# Patient Record
Sex: FEMALE | Race: WHITE | NOT HISPANIC OR LATINO | Employment: FULL TIME | URBAN - METROPOLITAN AREA
[De-identification: names, ages, dates, MRNs, and addresses within clinical notes are randomized per-mention and may not be internally consistent; named-entity substitution may affect disease eponyms.]

---

## 2017-01-23 RX ORDER — PANTOPRAZOLE SODIUM 40 MG/1
TABLET, DELAYED RELEASE ORAL
Qty: 90 TABLET | Refills: 0 | Status: SHIPPED | OUTPATIENT
Start: 2017-01-23 | End: 2017-04-17 | Stop reason: SDUPTHER

## 2017-02-10 ENCOUNTER — OFFICE VISIT (OUTPATIENT)
Dept: SPORTS MEDICINE | Facility: CLINIC | Age: 32
End: 2017-02-10

## 2017-02-10 VITALS
DIASTOLIC BLOOD PRESSURE: 80 MMHG | TEMPERATURE: 98.2 F | WEIGHT: 232 LBS | SYSTOLIC BLOOD PRESSURE: 104 MMHG | HEIGHT: 68 IN | HEART RATE: 76 BPM | BODY MASS INDEX: 35.16 KG/M2 | OXYGEN SATURATION: 100 %

## 2017-02-10 DIAGNOSIS — M25.461 KNEE EFFUSION, RIGHT: ICD-10-CM

## 2017-02-10 DIAGNOSIS — M25.561 ACUTE PAIN OF RIGHT KNEE: Primary | ICD-10-CM

## 2017-02-10 PROCEDURE — 99214 OFFICE O/P EST MOD 30 MIN: CPT | Performed by: FAMILY MEDICINE

## 2017-02-10 PROCEDURE — 73562 X-RAY EXAM OF KNEE 3: CPT | Performed by: FAMILY MEDICINE

## 2017-02-10 NOTE — PROGRESS NOTES
"Subjective   Sena Min is a 31 y.o. female.   Chief Complaint   Patient presents with   • Knee Pain     Patient complains that she has had knee pain for abaout 3 weeks and its not getting any better its getting worse.        History of Present Illness   1.  Patient is here today with complaint of right knee pain.  Patient states approximate 6 weeks ago while at work she did a deep squat and shortly after that she noted pain in the medial, superior and anterior knee.  She did not have any swelling at the time however she states that it does seem a bit \"puffy\" over the last 3 weeks.  The area of most discomfort is located over the superior lateral subpatellar  , worse with prolonged knee flexion, stairs, squats.  No locking or giving way.  No erythema.  Patient had a tib-fib fracture 16 years ago which required an intramedullary nail in the tibia and a plate over the distal fibula.  Patient had the plate removed last year.      Review of Systems   Constitutional: Negative for fever.   Musculoskeletal:        Per history of present illness   Skin: Negative for wound.   Neurological: Negative for numbness.   All other systems reviewed and are negative.    Visit Vitals   • /80 (BP Location: Left arm, Patient Position: Sitting, Cuff Size: Adult)   • Pulse 76   • Temp 98.2 °F (36.8 °C) (Oral)   • Ht 68\" (172.7 cm)   • Wt 232 lb (105 kg)   • SpO2 100%   • BMI 35.28 kg/m2       Objective   Physical Exam   Constitutional: She is oriented to person, place, and time. She appears well-developed and well-nourished.   HENT:   Head: Normocephalic and atraumatic.   Eyes: Conjunctivae and EOM are normal. Pupils are equal, round, and reactive to light.   Cardiovascular:   No peripheral edema   Pulmonary/Chest: Effort normal.   Musculoskeletal:   Right knee in general appearance, mild effusion, no erythema or heat.  Prior surgical scar over the superior tibia.  Well-healed.  Patient has pain with patella compression " quad contraction.  Patient also has pain over the medial and superior portion of the knee at the end of full flexion.  Negative Lachman, equivocal Reyna.  Patient has no pain with resisted knee extension or with resisted straight leg raise.   Neurological: She is alert and oriented to person, place, and time.   Skin: Skin is warm and dry.   Psychiatric: She has a normal mood and affect. Her behavior is normal.   Vitals reviewed.    Right Knee X-Ray  Indication: Pain    AP, Lateral, and Alabaster views    Findings:  No fracture  No bony lesion  Normal soft tissues  Normal joint spaces, patient does have a patella all to, some mild spurring over the lateral femoral condyle, slight genu valgus attitude. Mild enthesopathy superior patella pole.  Do not see the intramedullary kelly on this exam.    No prior studies were available for comparison.    Assessment/Plan   Sena was seen today for knee pain.    Diagnoses and all orders for this visit:    Acute pain of right knee  -     XR Knee 3+ View With Alabaster Right  -     MRI Knee Right Without Contrast; Future    Knee effusion, right  -     MRI Knee Right Without Contrast; Future      We will need to rule out internal derangement of the knee, will check MRI, patient will follow-up with me 2-3 days after the MRI is done.

## 2017-02-16 ENCOUNTER — HOSPITAL ENCOUNTER (OUTPATIENT)
Dept: MRI IMAGING | Facility: HOSPITAL | Age: 32
Discharge: HOME | End: 2017-02-16
Admitting: FAMILY MEDICINE

## 2017-02-16 DIAGNOSIS — M25.461 KNEE EFFUSION, RIGHT: ICD-10-CM

## 2017-02-16 DIAGNOSIS — M25.561 ACUTE PAIN OF RIGHT KNEE: ICD-10-CM

## 2017-02-16 PROCEDURE — 73721 MRI JNT OF LWR EXTRE W/O DYE: CPT

## 2017-02-20 DIAGNOSIS — S83.282A TEAR OF LATERAL MENISCUS OF LEFT KNEE, CURRENT, UNSPECIFIED TEAR TYPE, INITIAL ENCOUNTER: Primary | ICD-10-CM

## 2017-02-21 ENCOUNTER — TELEPHONE (OUTPATIENT)
Dept: ORTHOPEDIC SURGERY | Facility: CLINIC | Age: 32
End: 2017-02-21

## 2017-02-22 ENCOUNTER — TELEPHONE (OUTPATIENT)
Dept: SPORTS MEDICINE | Facility: CLINIC | Age: 32
End: 2017-02-22

## 2017-02-22 NOTE — TELEPHONE ENCOUNTER
----- Message from Maame Hill sent at 2/22/2017 11:17 AM EST -----      ----- Message -----     From: Kodi Bro MD     Sent: 2/20/2017   8:35 AM       To: Maame Hill    Meniscal tear and some loose bodies, refer to ortho, order has been placed

## 2017-02-24 ENCOUNTER — OFFICE VISIT (OUTPATIENT)
Dept: ORTHOPEDIC SURGERY | Facility: CLINIC | Age: 32
End: 2017-02-24

## 2017-02-24 VITALS — BODY MASS INDEX: 38.65 KG/M2 | WEIGHT: 232 LBS | TEMPERATURE: 99 F | HEIGHT: 65 IN

## 2017-02-24 DIAGNOSIS — S83.271A COMPLEX TEAR OF LATERAL MENISCUS OF RIGHT KNEE AS CURRENT INJURY, INITIAL ENCOUNTER: ICD-10-CM

## 2017-02-24 DIAGNOSIS — M94.261 CHONDROMALACIA, KNEE, RIGHT: Primary | ICD-10-CM

## 2017-02-24 PROCEDURE — 20610 DRAIN/INJ JOINT/BURSA W/O US: CPT | Performed by: ORTHOPAEDIC SURGERY

## 2017-02-24 PROCEDURE — 99244 OFF/OP CNSLTJ NEW/EST MOD 40: CPT | Performed by: ORTHOPAEDIC SURGERY

## 2017-02-24 RX ORDER — BUPIVACAINE HYDROCHLORIDE 5 MG/ML
2 INJECTION, SOLUTION PERINEURAL
Status: COMPLETED | OUTPATIENT
Start: 2017-02-24 | End: 2017-02-24

## 2017-02-24 RX ORDER — ACETAMINOPHEN 325 MG/1
650 TABLET ORAL AS NEEDED
COMMUNITY
End: 2017-05-04

## 2017-02-24 RX ORDER — IBUPROFEN 200 MG
200 TABLET ORAL AS NEEDED
COMMUNITY
End: 2017-02-24

## 2017-02-24 RX ORDER — MELOXICAM 15 MG/1
TABLET ORAL
Qty: 30 TABLET | Refills: 1 | Status: SHIPPED | OUTPATIENT
Start: 2017-02-24 | End: 2017-05-04

## 2017-02-24 RX ORDER — LIDOCAINE HYDROCHLORIDE 10 MG/ML
2 INJECTION, SOLUTION INFILTRATION; PERINEURAL
Status: COMPLETED | OUTPATIENT
Start: 2017-02-24 | End: 2017-02-24

## 2017-02-24 RX ORDER — METHYLPREDNISOLONE ACETATE 80 MG/ML
80 INJECTION, SUSPENSION INTRA-ARTICULAR; INTRALESIONAL; INTRAMUSCULAR; SOFT TISSUE
Status: COMPLETED | OUTPATIENT
Start: 2017-02-24 | End: 2017-02-24

## 2017-02-24 RX ADMIN — BUPIVACAINE HYDROCHLORIDE 2 ML: 5 INJECTION, SOLUTION PERINEURAL at 14:00

## 2017-02-24 RX ADMIN — METHYLPREDNISOLONE ACETATE 80 MG: 80 INJECTION, SUSPENSION INTRA-ARTICULAR; INTRALESIONAL; INTRAMUSCULAR; SOFT TISSUE at 14:00

## 2017-02-24 RX ADMIN — LIDOCAINE HYDROCHLORIDE 2 ML: 10 INJECTION, SOLUTION INFILTRATION; PERINEURAL at 14:00

## 2017-02-24 NOTE — PROGRESS NOTES
New Patient Complaint      Patient: Sena Min  YOB: 1985 31 y.o. female  Medical Record Number: [id number]    Chief Complaints: Knee hurts    History of Present Illness:  6-8 week history of moderate constant aching pain over the anterior and somewhat lateral aspect of the right knee with associated swelling really hasn't had any franky locking or catching.  No inciting injury but had a tibia fracture when she was 15 with medullary fixation nail has been removed. It's worse with squatting and kneeling and stairs.  It's been improved some intermittent use of ibuprofen and rest.  She is seen today at the request of Dr. Bro whose requested my opinion regarding etiology and treatment of this condition.        HPI    Allergies: No Known Allergies    Medications:   Current Outpatient Prescriptions on File Prior to Visit   Medication Sig   • guaifenesin-codeine (CHERATUSSIN AC) 100-10 MG/5ML liquid 10 ml po Q4H prn cough   • pantoprazole (PROTONIX) 40 MG EC tablet TAKE 1 TABLET BY MOUTH DAILY   • benzonatate (TESSALON) 200 MG capsule One PO Q8H for cough     No current facility-administered medications on file prior to visit.        Past Medical History   Diagnosis Date   • Anxiety    • Depression    • Factor 5 Leiden mutation, heterozygous    • GERD (gastroesophageal reflux disease)      Past Surgical History   Procedure Laterality Date   • Leg surgery  01/2016     plate removed   • Rosine tooth extraction  2009     Social History     Occupational History   • Not on file.     Social History Main Topics   • Smoking status: Never Smoker   • Smokeless tobacco: Not on file   • Alcohol use No   • Drug use: Defer   • Sexual activity: Defer      Social History     Social History Narrative     Family History   Problem Relation Age of Onset   • Breast cancer Mother    • Hypertension Mother    • Hypertension Father    • Factor V Leiden deficiency Father    • Factor V Leiden deficiency Sister    •  "Factor V Leiden deficiency Paternal Aunt    • Breast cancer Maternal Grandmother    • Diabetes Maternal Grandfather    • Alzheimer's disease Paternal Grandmother        Review of Systems: 14 point review of systems performed, positive pertinent findings identified in HPI. All remaining systems negative except cough     Review of Systems      Physical Exam:   Vitals:    02/24/17 1314   Temp: 99 °F (37.2 °C)   Weight: 232 lb (105 kg)   Height: 65\" (165.1 cm)     Physical Exam   Constitutional: pleasant, well developed   Eyes: sclera non icteric  Hearing : adequate for exam  Cardiovascular: palpable pulses in foot, calf/ thigh NT without sign of DVT  Respiratoy: breathing unlabored   Neurological: grossly sensate to LT throughout LE  Psychiatric: oriented with normal mood and affect.   Lymphatic: No palpable popliteal lymphadenopathy  Skin: intact throughout leg/foot  Musculoskeletal: Nonantalgic gait.  Well-healed incision anterior right knee 5 out of 5 extension strength.  0-115° range of motion.  Mild discomfort with crepitus on patellofemoral compression but no apprehension.  Mild discomfort over the lateral joint line but no exacerbation today with Reyna's.  Stable ligamentous exam  Physical Exam  Ortho Exam    Radiology: MRI films and report of the right knee dated 2/17/17 were reviewed and shows chronic chondromalacia lateral patellar facet was some intra-articular chondral bodies along the posterior lateral edge the medial compartment and appear meniscal cyst adjacent to a complex anterior lateral meniscal horn tear.    Assessment/Plan: Right knee chondromalacia patella with lateral anterior horn meniscal tear with meniscal cyst and intra-articular chondral bodies.  We discussed operative and nonoperative treatment options she's quite busy at work and other activities we'll like to avoid surgical treatment at this time.  We discussed other treatment options will have her start some physical therapy at " [facility] and begin meloxicam 15 mg one by mouth daily with GI precautions.  She'll stop her ibuprofen.  Also right knee was injected with steroid.  She will avoid exacerbating activities and I'll see her back in 1 month if she is unimproved we'll proceed with arthroscopy.  Standing x-rays of her right knee at follow-up    Large Joint Arthrocentesis  Date/Time: 2/24/2017 2:00 PM  Consent given by: patient  Site marked: site marked  Timeout: Immediately prior to procedure a time out was called to verify the correct patient, procedure, equipment, support staff and site/side marked as required   Supporting Documentation  Indications: pain   Procedure Details  Location: knee - R knee  Needle size: 22 G  Approach: anteromedial  Medications administered: 2 mL lidocaine 1 %; 80 mg methylPREDNISolone acetate 80 MG/ML; 2 mL bupivacaine  Patient tolerance: patient tolerated the procedure well with no immediate complications

## 2017-02-27 ENCOUNTER — TELEPHONE (OUTPATIENT)
Dept: ORTHOPEDIC SURGERY | Facility: CLINIC | Age: 32
End: 2017-02-27

## 2017-02-27 NOTE — TELEPHONE ENCOUNTER
"Return patient's phone call she said her knee injected about 3 days ago had quite a bit of pain over the weekend which has now thankfully improved but her knee really doesn't feel any better.\":  Head and doing the surgery\" I told her I wanted to see how the injection did for her first and it could take a week to 10 days to really can't can.  She was in agreement with that and is going to start some physical therapy towards end of this week but is going to call me next week and see how she is doing in the interim she is going to pay attention to have much mechanical symptoms if any are relieved.  Any further problems or questions she will call.  "

## 2017-02-27 NOTE — TELEPHONE ENCOUNTER
Second my chart message:      Update about pain after cortisone injection: the pain subsided about 28 hours after the injection. I am now only taking the Meloxicam and Tylenol.              Encounter Messages

## 2017-02-27 NOTE — TELEPHONE ENCOUNTER
----- Message from Sena Min sent at 2/25/2017  4:31 AM EST -----  Regarding: Visit Follow-Up Question    My chart message:    I need to report a worsening of pain after the cortisone injection. Within 1 hour I had a little bit of swelling and tightness in my knee. At 4 hours, the pain was bad enough I went ahead and took a dose of codeine. About an hour later, I was in the worst pain of my life extending from my knee to mid back on the right side. After waiting for the codeine to wear off (4 hours later) I took some hydrocodone I had been prescribed after a procedure last year but never needed. This relieved the pain for 5 hours, but also makes me sleepy. The new swelling has subsided, and no other reaction occurred. After some online research, I found these pain flares last 1-3 days.  If it last longer or a new symptom appears, I will be calling.  In the future, I do not wish to receive these injections.

## 2017-03-01 ENCOUNTER — TELEPHONE (OUTPATIENT)
Dept: ORTHOPEDIC SURGERY | Facility: CLINIC | Age: 32
End: 2017-03-01

## 2017-03-02 ENCOUNTER — TELEPHONE (OUTPATIENT)
Dept: ORTHOPEDIC SURGERY | Facility: CLINIC | Age: 32
End: 2017-03-02

## 2017-03-02 NOTE — TELEPHONE ENCOUNTER
: Spoke with patient after receiving email from her that her knee was feeling much better after the injection pain had worn off that she was not having any mechanical symptoms to some dull achy pain in the knee with swelling similar to what she had prior to the injection.  She felt like the work restrictions that I had placed on her note were too limiting and after long careful discussion with her she felt like she could do a 20 pound weightlifting restriction without crutches but no squatting or kneeling or use a wheelbarrow.  We'll place a new note in her chart to that effect.  If anything worsens she will back off and let me know otherwise will follow-up as scheduled

## 2017-03-03 ENCOUNTER — TREATMENT (OUTPATIENT)
Dept: PHYSICAL THERAPY | Facility: CLINIC | Age: 32
End: 2017-03-03

## 2017-03-03 DIAGNOSIS — S83.271D: ICD-10-CM

## 2017-03-03 DIAGNOSIS — M94.261 CHONDROMALACIA OF KNEE, RIGHT: Primary | ICD-10-CM

## 2017-03-03 PROCEDURE — G0283 ELEC STIM OTHER THAN WOUND: HCPCS | Performed by: PHYSICAL THERAPIST

## 2017-03-03 PROCEDURE — 97161 PT EVAL LOW COMPLEX 20 MIN: CPT | Performed by: PHYSICAL THERAPIST

## 2017-03-03 PROCEDURE — 97110 THERAPEUTIC EXERCISES: CPT | Performed by: PHYSICAL THERAPIST

## 2017-03-03 NOTE — PATIENT INSTRUCTIONS
Access Code: [email or web address]   URL: [email or web address]   Date: 03/03/2017   Prepared by: Iveth Gutierrez     Exercises   Supine Active Straight Leg Raise - 10 reps - 2 sets - 1x daily   Clamshell - 10 reps - 2 sets - 1x daily   Sidelying Hip Abduction - 10 reps - 2 sets - 1x daily   Prone Hip Extension - One Pillow - 10 reps - 2 sets - 1x daily   Straight Leg Raise with External Rotation - 10 reps - 2 sets - 1x daily

## 2017-03-03 NOTE — PROGRESS NOTES
Physical Therapy Initial Evaluation and Plan of Care    Patient: Sena Min   : 1985  Diagnosis/ICD-10 Code:  Chondromalacia of knee, right [M94.261]  Referring practitioner: Ad Solorzano*  Date of Initial Visit: 3/3/2017  Today's Date: 3/3/2017    Subjective Evaluation    History of Present Illness  Onset date: About two months ago.  Mechanism of injury: Pt reports onset of knee swelling two months ago after performing a deep squat. No pain noted at that time, primary c/o that sent pt to MD was swelling.  She states MD noted arthritis behind the knee cap, MRI showed chondromalacia and lateral meniscus tear. MD did a cortisone injection in office.    Quality of life: good    Pain  Current pain ratin  At best pain ratin  At worst pain ratin (5 hours after cortisone injection)  Location: Right knee  Quality: dull ache and sharp  Relieving factors: ice  Exacerbated by: swelling is constant, not affected by activity.  Progression: no change    Social Support  Lives in: multiple-level home  Lives with: parents    Diagnostic Tests  MRI studies: abnormal (see report in chart)    Treatments  Previous treatment: injection treatment  Patient Goals  Patient goals for therapy: decreased edema and return to work (avoid surgery, learn good body mechanics for lifting)             Objective     Palpation     Right   No palpable tenderness to the distal biceps femoris, distal semimembranosus, distal semitendinosus, rectus femoris, vastus lateralis and vastus medialis.     Tenderness     Right Knee   No tenderness in the inferior patella, lateral joint line, lateral patella, medial joint line, medial patella, patellar tendon and quadriceps tendon.     Neurological Testing   Sensation     Knee   Left Knee   Intact: light touch    Right Knee   Intact: light touch     Active Range of Motion   Left Knee   Flexion: 122 degrees   Extension: 3 (hyperextension) degrees     Right Knee   Flexion: 121  degrees   Extension: 4 (hyperextension) degrees     Patellar Static Positioning   Left Knee: lateral tilt and halegih  Right Knee: lateral tilt and haleigh    Strength/Myotome Testing     Left Hip   Planes of Motion   Flexion: 4-  Extension: 4-  Abduction: 4-  External rotation: 4-  Internal rotation: 4-    Right Hip   Planes of Motion   Flexion: 4-  Extension: 4-  Abduction: 4-  External rotation: 4-  Internal rotation: 4-    Left Knee   Flexion: 5  Extension: 5    Right Knee   Flexion: 4-  Extension: 4-    Tests     Right Knee   Positive Apley's compression, lateral Reyna and patella-femoral grind.   Negative anterior drawer, anterior Lachman, Apley's distraction, bounce home, medial Reyna, patellar apprehension, posterior Lachman, valgus stress test at 0 degrees, valgus stress test at 30 degrees, varus stress test at 0 degrees and varus stress test at 30 degrees.     Swelling     Left Knee Girth Measurement (cm)   Joint line: 44 cm  10 cm below joint line: 44 cm    Right Knee Girth Measurement (cm)   Joint line: 44 cm  10 cm below joint line: 45 cm    Functional Assessment   Squat   Left valgus, left tibial anterior translation beyond toes, right valgus and right tibial anterior translation beyond toes.     Single Leg Squat   Left Leg  Valgus and anterior tibial translation beyond toes.     Right Leg  Pain, valgus and tibial anterior translation beyond toes.     General Comments     Knee Comments  IPP L 40 cm R 41 cm         Assessment & Plan     Assessment  Impairments: abnormal gait, abnormal or restricted ROM, impaired physical strength, lacks appropriate home exercise program and pain with function  Assessment details: Pt will benefit from skilled PT services in order to address listed impairments and increase tolerance to normal daily activities including ADL's, work and recreational activities.    Prognosis: good  Prognosis details: Short Term Goals: 4-6 weeks. Patient will:  1. Be independent with initial  HEP  2. Be instructed in posture and body mechanics  3. Report pain of </= 2/10 with all daily activities    Long Term Goals: 6-12 weeks. Patient will:  1. Demonstrate improved Bilateral lower extremity MMT of >/= 4+/5  2. Demonstrate symmetrical knee girth measurements.  3. Demonstrate adequate control of dynamic genu valgus with squat and single leg squat to allow lifting/squatting without increased pain.  4. Report pain of </= 0/10 with all daily activities.  5. Perceived disability </= 10% as measured by LEFS         Plan  Therapy options: will be seen for skilled physical therapy services  Planned modality interventions: cryotherapy, thermotherapy (hydrocollator packs), ultrasound and electrical stimulation/Russian stimulation  Planned therapy interventions: manual therapy, neuromuscular re-education, soft tissue mobilization, strengthening, stretching, joint mobilization, home exercise program, gait training, functional ROM exercises, flexibility, body mechanics training, balance/weight-bearing training and abdominal trunk stabilization  Frequency: 2x week  Duration in weeks: 12  Treatment plan discussed with: patient        Manual Therapy:    0     mins  44366;  Therapeutic Exercise:    15     mins  57065;     Neuromuscular Ramana:    0    mins  64117;    Therapeutic Activity:     0     mins  11302;     Gait Trainin     mins  01115;     Ultrasound:     0     mins  90461;    Electrical Stimulation:    15     mins  83726 ( );    Timed Treatment:   15   mins   Total Treatment:     60   mins    PT SIGNATURE: Iveth Gutierrez PT, DPT          Physical Therapist                               [place] License #[id number]    DATE TREATMENT INITIATED: 3/3/2017    Initial Certification Certification Period: 2017  I certify that the therapy services are furnished while this patient is under my care.  The services outlined above are required by this patient, and will be reviewed every 90 days.     PHYSICIAN: Ad  Wei Solorzano MD      DATE:     Please sign and return via fax to [phone].. Thank you, [facility].

## 2017-03-08 ENCOUNTER — TREATMENT (OUTPATIENT)
Dept: PHYSICAL THERAPY | Facility: CLINIC | Age: 32
End: 2017-03-08

## 2017-03-08 DIAGNOSIS — S83.271D: ICD-10-CM

## 2017-03-08 DIAGNOSIS — M94.261 CHONDROMALACIA OF KNEE, RIGHT: Primary | ICD-10-CM

## 2017-03-08 PROCEDURE — 97110 THERAPEUTIC EXERCISES: CPT | Performed by: PHYSICAL THERAPIST

## 2017-03-08 PROCEDURE — G0283 ELEC STIM OTHER THAN WOUND: HCPCS | Performed by: PHYSICAL THERAPIST

## 2017-03-08 NOTE — PROGRESS NOTES
" Physical Therapy Daily Progress Note    Subjective     Sena Min reports: adherence with HEP, knee is \"feeling better\"      Objective   See Exercise, Manual, and Modality Logs for complete treatment.       Assessment/Plan  Tolerated new exercises well without pain.  Requires cueing for squat form.  Progress per Plan of Care           Manual Therapy:    0     mins  53839;  Therapeutic Exercise:    40     mins  68780;     Neuromuscular Ramana:    0    mins  48487;    Therapeutic Activity:     0     mins  24029;     Gait Trainin     mins  73712;     Ultrasound:     0     mins  13486;    Electrical Stimulation:    15     mins  05027 ( );    Timed Treatment:   40   mins   Total Treatment:     55   mins     Iveth Gutierrez PT, DPT  Physical Therapist  [place] License #[id number]          "

## 2017-03-10 ENCOUNTER — TREATMENT (OUTPATIENT)
Dept: PHYSICAL THERAPY | Facility: CLINIC | Age: 32
End: 2017-03-10

## 2017-03-10 DIAGNOSIS — M94.261 CHONDROMALACIA OF KNEE, RIGHT: Primary | ICD-10-CM

## 2017-03-10 DIAGNOSIS — S83.271D: ICD-10-CM

## 2017-03-10 PROCEDURE — G0283 ELEC STIM OTHER THAN WOUND: HCPCS | Performed by: PHYSICAL THERAPIST

## 2017-03-10 PROCEDURE — 97110 THERAPEUTIC EXERCISES: CPT | Performed by: PHYSICAL THERAPIST

## 2017-03-10 NOTE — PROGRESS NOTES
Physical Therapy Daily Progress Note    Subjective     Sena Min reports: some increased swelling after last session, also did some yard work and both knees were sore.      Objective   See Exercise, Manual, and Modality Logs for complete treatment.       Assessment/Plan  Tolerated well without increased pain.  Progress strengthening /stabilization /functional activity           Manual Therapy:    0     mins  44290;  Therapeutic Exercise:    30     mins  55116;     Neuromuscular Ramana:    0    mins  17357;    Therapeutic Activity:     0     mins  29825;     Gait Trainin     mins  49190;     Ultrasound:     0     mins  32799;    Electrical Stimulation:    15     mins  62849 ( );    Timed Treatment:   30   mins   Total Treatment:     45   mins    Iveth Gutierrez PT, DPT  Physical Therapist  [place] License #[id number]

## 2017-03-14 ENCOUNTER — TREATMENT (OUTPATIENT)
Dept: PHYSICAL THERAPY | Facility: CLINIC | Age: 32
End: 2017-03-14

## 2017-03-14 DIAGNOSIS — M94.261 CHONDROMALACIA OF KNEE, RIGHT: Primary | ICD-10-CM

## 2017-03-14 DIAGNOSIS — S83.271D: ICD-10-CM

## 2017-03-14 PROCEDURE — G0283 ELEC STIM OTHER THAN WOUND: HCPCS | Performed by: PHYSICAL THERAPIST

## 2017-03-14 PROCEDURE — 97110 THERAPEUTIC EXERCISES: CPT | Performed by: PHYSICAL THERAPIST

## 2017-03-14 NOTE — PROGRESS NOTES
Physical Therapy Daily Progress Note    Subjective     Sena Min reports: knee is getting better overall      Objective   See Exercise, Manual, and Modality Logs for complete treatment.       Assessment/Plan  Lateral hip strength improving.  Will benefit from continued PT to increase strength and improve body mechanics with lifting.  Progress per Plan of Care           Manual Therapy:    0     mins  01825;  Therapeutic Exercise:    35     mins  88874;     Neuromuscular Ramana:    0    mins  79372;    Therapeutic Activity:     0     mins  98960;     Gait Trainin     mins  87077;     Ultrasound:     0     mins  37300;    Electrical Stimulation:    15     mins  21522 ( );    Timed Treatment:   35   mins   Total Treatment:     50   mins    Iveth Gutierrez PT, DPT  Physical Therapist  [place] License #[id number]

## 2017-03-16 ENCOUNTER — TREATMENT (OUTPATIENT)
Dept: PHYSICAL THERAPY | Facility: CLINIC | Age: 32
End: 2017-03-16

## 2017-03-16 DIAGNOSIS — M94.261 CHONDROMALACIA OF KNEE, RIGHT: Primary | ICD-10-CM

## 2017-03-16 DIAGNOSIS — S83.271D: ICD-10-CM

## 2017-03-16 PROCEDURE — 97110 THERAPEUTIC EXERCISES: CPT | Performed by: PHYSICAL THERAPIST

## 2017-03-16 PROCEDURE — G0283 ELEC STIM OTHER THAN WOUND: HCPCS | Performed by: PHYSICAL THERAPIST

## 2017-03-16 NOTE — PROGRESS NOTES
" Physical Therapy Daily Progress Note    Subjective     Sena Min reports: knee is feeling \"pretty good\"      Objective   See Exercise, Manual, and Modality Logs for complete treatment.       Assessment/Plan  Tolerates new exercises well without pain.  Improved lateral hip control, still unable to perform full squat without pain, only partial squat.  Progress per Plan of Care           Manual Therapy:    0     mins  84075;  Therapeutic Exercise:    40     mins  64798;     Neuromuscular Ramana:    0    mins  89755;    Therapeutic Activity:     0     mins  63839;     Gait Trainin     mins  22463;     Ultrasound:     0     mins  75448;    Electrical Stimulation:    15     mins  15200 ( );    Timed Treatment:   40   mins   Total Treatment:     55   mins    Iveth Gutierrez PT, DPT  Physical Therapist  [place] License #[id number]                    "

## 2017-03-21 ENCOUNTER — TREATMENT (OUTPATIENT)
Dept: PHYSICAL THERAPY | Facility: CLINIC | Age: 32
End: 2017-03-21

## 2017-03-21 DIAGNOSIS — S83.271D: ICD-10-CM

## 2017-03-21 DIAGNOSIS — M94.261 CHONDROMALACIA OF KNEE, RIGHT: Primary | ICD-10-CM

## 2017-03-21 PROCEDURE — 97110 THERAPEUTIC EXERCISES: CPT | Performed by: PHYSICAL THERAPIST

## 2017-03-21 PROCEDURE — G0283 ELEC STIM OTHER THAN WOUND: HCPCS | Performed by: PHYSICAL THERAPIST

## 2017-03-23 ENCOUNTER — TREATMENT (OUTPATIENT)
Dept: PHYSICAL THERAPY | Facility: CLINIC | Age: 32
End: 2017-03-23

## 2017-03-23 DIAGNOSIS — M94.261 CHONDROMALACIA OF KNEE, RIGHT: Primary | ICD-10-CM

## 2017-03-23 DIAGNOSIS — S83.271D: ICD-10-CM

## 2017-03-23 PROCEDURE — 97110 THERAPEUTIC EXERCISES: CPT | Performed by: PHYSICAL THERAPIST

## 2017-03-23 PROCEDURE — G0283 ELEC STIM OTHER THAN WOUND: HCPCS | Performed by: PHYSICAL THERAPIST

## 2017-03-23 NOTE — PROGRESS NOTES
" Physical Therapy Daily Progress Note    Subjective     Sena Min reports: knee is feeling \"really good\"      Objective   See Exercise, Manual, and Modality Logs for complete treatment.       Assessment/Plan  Girth measurements are now symmetrical.  Did well with addition of ankle weight to mat exercises.  Recommend continuing PT to further progress strength and stability at the knee.  Progress per Plan of Care           Manual Therapy:    0     mins  10629;  Therapeutic Exercise:    40     mins  13152;     Neuromuscular Ramana:    0    mins  15629;    Therapeutic Activity:     0     mins  53477;     Gait Trainin     mins  41106;     Ultrasound:     0     mins  24813;    Electrical Stimulation:    15     mins  59131 ( );    Timed Treatment:   40   mins   Total Treatment:     55   mins    Iveth Gutierrez PT, DPT  Physical Therapist  [place] License #[id number]                    "

## 2017-03-24 ENCOUNTER — OFFICE VISIT (OUTPATIENT)
Dept: ORTHOPEDIC SURGERY | Facility: CLINIC | Age: 32
End: 2017-03-24

## 2017-03-24 VITALS — HEIGHT: 66 IN | TEMPERATURE: 98.4 F | BODY MASS INDEX: 36.96 KG/M2 | WEIGHT: 230 LBS

## 2017-03-24 DIAGNOSIS — M94.261 CHONDROMALACIA, KNEE, RIGHT: Primary | ICD-10-CM

## 2017-03-24 DIAGNOSIS — S83.271A COMPLEX TEAR OF LATERAL MENISCUS OF RIGHT KNEE AS CURRENT INJURY, INITIAL ENCOUNTER: ICD-10-CM

## 2017-03-24 PROBLEM — M94.269 CHONDROMALACIA, KNEE: Status: ACTIVE | Noted: 2017-03-24

## 2017-03-24 PROCEDURE — 73562 X-RAY EXAM OF KNEE 3: CPT | Performed by: ORTHOPAEDIC SURGERY

## 2017-03-24 PROCEDURE — 99213 OFFICE O/P EST LOW 20 MIN: CPT | Performed by: ORTHOPAEDIC SURGERY

## 2017-03-27 NOTE — PROGRESS NOTES
"Knee Exam      Patient: Sena Min    YOB: 1985 31 y.o. female    Chief Complaints: knee feels better    History of Present Illness: Patient was seen on 2/24/17 with a 6-8 week history of moderate constant aching pain over the anterior lateral aspect of the right knee with some associated swelling but no franky mechanical symptoms.  His history of intramedullary fixation of the tibia which has been removed.  She had steroid injection done at her last visit has also been on meloxicam and been doing physical therapy with significant improvement in her right knee pain currently says is \"doing great\".  Not having any pain swelling or mechanical symptoms.  HPI    ROS: No knee pain  Past Medical History:   Diagnosis Date   • Anxiety    • Depression    • Factor 5 Leiden mutation, heterozygous    • GERD (gastroesophageal reflux disease)        Physical Exam:   Vitals:    03/24/17 1322   Temp: 98.4 °F (36.9 °C)   Weight: 230 lb (104 kg)   Height: 66\" (167.6 cm)     Well developed with normal mood.  Nonantalgic gait.  Examination the right knee shows no warmth erythema or swelling.  0-115° range of motion some mild crepitus with patellofemoral compression but no significant discomfort.  No pain today over the lateral joint line nor any pain with Reyna's.      Radiology: 3 views of the right knee were ordered today to evaluate alignment reviewed and there are no prior x-rays available for comparison.  Overall joint space appears to be relatively well maintained with some mild gingival change of the patellofemoral joint with some lateral tilt.      Assessment/Plan: Right knee Chondral malacia patella with lateral anterior horn meniscal tear with loose bodies along the posterior edge of the medial compartment.  Discussed treatment options with her today and as she is not having any symptoms I would not recommend any surgical treatment and she is in agreement with that.  She will continue with " meloxicam and check with her PCP regarding ongoing dosing and monitoring of that.  She's not taking any pain medications in over 3 weeks now.  She will continue with physical therapy concentrating on VMO strengthening and progress to home exercises.  She may return to work without restrictions if she has any return of pain swelling or mechanical symptoms she'll let me know otherwise I'll see her back as needed

## 2017-03-28 ENCOUNTER — TREATMENT (OUTPATIENT)
Dept: PHYSICAL THERAPY | Facility: CLINIC | Age: 32
End: 2017-03-28

## 2017-03-28 PROCEDURE — 97110 THERAPEUTIC EXERCISES: CPT | Performed by: PHYSICAL THERAPIST

## 2017-03-28 PROCEDURE — G0283 ELEC STIM OTHER THAN WOUND: HCPCS | Performed by: PHYSICAL THERAPIST

## 2017-03-28 NOTE — PROGRESS NOTES
Physical Therapy Daily Progress Note    Subjective     Sena Min reports: she saw her MD, who is pleased with progress, is to follow up PRN with him, continue PT with transition to HEP.  She returned to full duty at work yesterday.      Objective   See Exercise, Manual, and Modality Logs for complete treatment.       Assessment/Plan  Tolerates exercises well.  Will benefit from 1-2 more weeks of PT to further progress stabilization exercises and establish long term HEP.  Progress per Plan of Care           Manual Therapy:    0     mins  34276;  Therapeutic Exercise:    40     mins  91178;     Neuromuscular Ramana:    0    mins  52209;    Therapeutic Activity:     0     mins  17972;     Gait Trainin     mins  72739;     Ultrasound:     0     mins  60881;    Electrical Stimulation:    15     mins  34544 ( );    Timed Treatment:   40   mins   Total Treatment:     55   mins    Iveth Gutierrez PT, DPT  Physical Therapist  [place] License #[id number]

## 2017-03-30 ENCOUNTER — TREATMENT (OUTPATIENT)
Dept: PHYSICAL THERAPY | Facility: CLINIC | Age: 32
End: 2017-03-30

## 2017-03-30 DIAGNOSIS — M94.261 CHONDROMALACIA OF KNEE, RIGHT: Primary | ICD-10-CM

## 2017-03-30 DIAGNOSIS — S83.271D: ICD-10-CM

## 2017-03-30 PROCEDURE — G0283 ELEC STIM OTHER THAN WOUND: HCPCS | Performed by: PHYSICAL THERAPIST

## 2017-03-30 PROCEDURE — 97110 THERAPEUTIC EXERCISES: CPT | Performed by: PHYSICAL THERAPIST

## 2017-03-30 NOTE — PROGRESS NOTES
Physical Therapy Daily Progress Note    Subjective     Sena Min reports: knee feels good.  Is working into doing more lifting at work.      Objective   See Exercise, Manual, and Modality Logs for complete treatment.       Assessment/Plan  Tolerated exercises well without pain. Good lateral hip control with standing exercises.  Requires min cueing for form.  Progress per Plan of Care           Manual Therapy:    0     mins  71293;  Therapeutic Exercise:    45     mins  50028;     Neuromuscular Ramana:    0    mins  77077;    Therapeutic Activity:     0     mins  93128;     Gait Trainin     mins  44395;     Ultrasound:     0     mins  06097;    Electrical Stimulation:    15     mins  07814 ( );    Timed Treatment:   45   mins   Total Treatment:     60   mins    Iveth Gutierrez PT, DPT  Physical Therapist  [place] License #[id number]

## 2017-04-04 ENCOUNTER — TREATMENT (OUTPATIENT)
Dept: PHYSICAL THERAPY | Facility: CLINIC | Age: 32
End: 2017-04-04

## 2017-04-04 DIAGNOSIS — M94.261 CHONDROMALACIA OF KNEE, RIGHT: Primary | ICD-10-CM

## 2017-04-04 DIAGNOSIS — S83.271D: ICD-10-CM

## 2017-04-04 PROCEDURE — G0283 ELEC STIM OTHER THAN WOUND: HCPCS | Performed by: PHYSICAL THERAPIST

## 2017-04-04 PROCEDURE — 97110 THERAPEUTIC EXERCISES: CPT | Performed by: PHYSICAL THERAPIST

## 2017-04-04 NOTE — PROGRESS NOTES
Re-Assessment / Re-Certification    Patient: Sena Min   : 1985  Diagnosis/ICD-10 Code:  Chondromalacia of knee, right [M94.261]  Referring practitioner: Ad Solorzano*  Date of Initial Visit: 2017  Today's Date: 2017  Patient seen for 10 sessions      Subjective:   Sena Min reports: minimal pain, swelling seems to have resolved.  Feels like she is stronger.  Subjective Questionnaire: LEFS: 71/80 (11% perceived disability) (improved from 64/80 at initial evaluation)  Clinical Progress: improved  Home Program Compliance: Yes  Treatment has included: therapeutic exercise, neuromuscular re-education, electrical stimulation and cryotherapy    Subjective Evaluation    Pain  Current pain ratin  At best pain ratin  At worst pain ratin         Objective     Strength/Myotome Testing     Left Hip   Planes of Motion   Flexion: 4+  Extension: 4  Abduction: 4+    Right Hip   Planes of Motion   Flexion: 4+  Extension: 4  Abduction: 4+    Swelling     Right Knee Girth Measurement (cm)   10 cm below joint line: 44 cm    Functional Assessment   Squat No left valgus, no left tibial anterior translation beyond toes, no right valgus and no right tibial anterior translation beyond toes.     Single Leg Squat   Left Leg  Valgus and anterior tibial translation beyond toes.     Right Leg  Valgus and tibial anterior translation beyond toes.     General Comments     Knee Comments  IPP right = 40 cm     Assessment/Plan  Progress toward previous goals: Partially Met    Short Term Goals: 4-6 weeks. Patient will:  1. Be independent with initial HEP (MET)  2. Be instructed in posture and body mechanics (MET)  3. Report pain of </= 2/10 with all daily activities (MET)    Long Term Goals: 6-12 weeks. Patient will:  1. Demonstrate improved Bilateral lower extremity MMT of >/= 4+/5 (PARTIALLY MET)  2. Demonstrate symmetrical knee girth measurements. (MET)  3. Demonstrate adequate control  of dynamic genu valgus with squat and single leg squat to allow lifting/squatting without increased pain. (PARTIALLY MET)  4. Report pain of </= 0/10 with all daily activities. (NOT MET)  5. Perceived disability </= 10% as measured by LEFS (NOT MET)      Recommendations: Continue with recommendations 1-2 more weeks, then D/C to HEP  Timeframe: 1 month  Prognosis to achieve goals: good    PT Signature: Iveth Gutierrez PT, DPT                         Physical Therapist                         [place] License #[id number]    Manual Therapy:    0     mins  52771;  Therapeutic Exercise:    45     mins  37439;     Neuromuscular Ramana:    0    mins  30515;    Therapeutic Activity:     0     mins  85230;     Gait Trainin     mins  68201;     Ultrasound:     0     mins  62203;    Electrical Stimulation:    15     mins  92156 ( );    Timed Treatment:   45   mins   Total Treatment:     60   mins

## 2017-04-11 ENCOUNTER — TREATMENT (OUTPATIENT)
Dept: PHYSICAL THERAPY | Facility: CLINIC | Age: 32
End: 2017-04-11

## 2017-04-11 DIAGNOSIS — S83.271D: ICD-10-CM

## 2017-04-11 DIAGNOSIS — M94.261 CHONDROMALACIA OF KNEE, RIGHT: Primary | ICD-10-CM

## 2017-04-11 PROCEDURE — 97110 THERAPEUTIC EXERCISES: CPT | Performed by: PHYSICAL THERAPIST

## 2017-04-11 NOTE — PROGRESS NOTES
Physical Therapy Daily Progress Note    Subjective     Sena Min reports: no pain with work activities.      Objective   See Exercise, Manual, and Modality Logs for complete treatment.       Assessment/Plan  D/C'd ESTIM since pt is having no pain or edema.  One more visit next week to establish long term HEP, then D/C.   Progress per Plan of Care           Manual Therapy:    0     mins  82629;  Therapeutic Exercise:    45     mins  36998;     Neuromuscular Ramana:    0    mins  57912;    Therapeutic Activity:     0     mins  38143;     Gait Trainin     mins  66249;     Ultrasound:     0     mins  16954;    Electrical Stimulation:    0     mins  56975 ( );    Timed Treatment:   45   mins   Total Treatment:     45   mins    Iveth Gutierrez PT, DPT  Physical Therapist  [place] License #[id number]

## 2017-04-17 ENCOUNTER — TREATMENT (OUTPATIENT)
Dept: PHYSICAL THERAPY | Facility: CLINIC | Age: 32
End: 2017-04-17

## 2017-04-17 DIAGNOSIS — M94.261 CHONDROMALACIA OF KNEE, RIGHT: Primary | ICD-10-CM

## 2017-04-17 DIAGNOSIS — S83.271D: ICD-10-CM

## 2017-04-17 PROCEDURE — 97110 THERAPEUTIC EXERCISES: CPT | Performed by: PHYSICAL THERAPIST

## 2017-04-17 RX ORDER — PANTOPRAZOLE SODIUM 40 MG/1
TABLET, DELAYED RELEASE ORAL
Qty: 90 TABLET | Refills: 0 | Status: SHIPPED | OUTPATIENT
Start: 2017-04-17 | End: 2017-07-15 | Stop reason: SDUPTHER

## 2017-04-17 NOTE — PROGRESS NOTES
Physical Therapy Daily Progress Note    Subjective     Sena Min reports: no pain or swelling in knee.  Is performing all work tasks without limitation.      Objective   See Exercise, Manual, and Modality Logs for complete treatment.   LEFS indicates 3% perceived disability  Good body mechanics with squat    Assessment/Plan  Goals met, appropriate to D/C at this time.  Progress per Plan of Care           Manual Therapy:    0     mins  86565;  Therapeutic Exercise:    40     mins  60773;     Neuromuscular Ramana:    0    mins  01565;    Therapeutic Activity:     0     mins  42545;     Gait Trainin     mins  84780;     Ultrasound:     0     mins  84089;    Electrical Stimulation:    0     mins  48777 ( );    Timed Treatment:   40   mins   Total Treatment:     40   mins    Iveth Gutierrez PT, DPT  Physical Therapist  [place] License #[id number]

## 2017-04-17 NOTE — PROGRESS NOTES
Discharge Report    Patient Name: Sena Min       Patient MRN: [id number]  : 1985  Physician:  Date: 2017    Encounter Diagnoses   Name Primary?   • Chondromalacia of knee, right Yes   • Complex tear of lateral meniscus as current injury, right, subsequent encounter        Treatment has included therapeutic exercise, electrical stimulation and cryotherapy     Physical Therapy Daily Progress Note    Subjective     Sena Min reports: no pain or swelling in knee.  Is performing all work tasks without limitation.      Objective   See Exercise, Manual, and Modality Logs for complete treatment.   LEFS indicates 3% perceived disability  Good body mechanics with squat    Assessment/Plan  Goals met, appropriate to D/C at this time.  Progress per Plan of Care           Manual Therapy:    0     mins  53264;  Therapeutic Exercise:    40     mins  54108;     Neuromuscular Ramana:    0    mins  67892;    Therapeutic Activity:     0     mins  85340;     Gait Trainin     mins  75456;     Ultrasound:     0     mins  44453;    Electrical Stimulation:    0     mins  63585 ( );    Timed Treatment:   40   mins   Total Treatment:     40   mins    Iveth Gutierrez PT, DPT  Physical Therapist  [place] License #[id number]                        Assessment:   Progress towards goals: All Met    Discharge Reason: Patient achieved goals      Plan of Care: Continue with current home exercise program as instructed    Prognosis: excellent    Understanding at Discharge: excellent

## 2017-05-04 ENCOUNTER — OFFICE VISIT (OUTPATIENT)
Dept: SPORTS MEDICINE | Facility: CLINIC | Age: 32
End: 2017-05-04

## 2017-05-04 VITALS
HEART RATE: 59 BPM | DIASTOLIC BLOOD PRESSURE: 74 MMHG | BODY MASS INDEX: 36.8 KG/M2 | WEIGHT: 229 LBS | OXYGEN SATURATION: 100 % | TEMPERATURE: 98 F | HEIGHT: 66 IN | SYSTOLIC BLOOD PRESSURE: 122 MMHG

## 2017-05-04 DIAGNOSIS — R10.2 PELVIC PAIN IN FEMALE: Primary | ICD-10-CM

## 2017-05-04 PROCEDURE — 99214 OFFICE O/P EST MOD 30 MIN: CPT | Performed by: FAMILY MEDICINE

## 2017-05-10 ENCOUNTER — HOSPITAL ENCOUNTER (OUTPATIENT)
Dept: ULTRASOUND IMAGING | Facility: HOSPITAL | Age: 32
Discharge: HOME | End: 2017-05-10
Admitting: FAMILY MEDICINE

## 2017-05-10 DIAGNOSIS — R10.2 PELVIC PAIN IN FEMALE: ICD-10-CM

## 2017-05-10 PROCEDURE — 76830 TRANSVAGINAL US NON-OB: CPT

## 2017-05-10 PROCEDURE — 93976 VASCULAR STUDY: CPT

## 2017-05-10 PROCEDURE — 76856 US EXAM PELVIC COMPLETE: CPT

## 2017-07-17 RX ORDER — PANTOPRAZOLE SODIUM 40 MG/1
TABLET, DELAYED RELEASE ORAL
Qty: 90 TABLET | Refills: 3 | Status: SHIPPED | OUTPATIENT
Start: 2017-07-17 | End: 2018-07-15 | Stop reason: SDUPTHER

## 2017-11-27 ENCOUNTER — TRANSCRIBE ORDERS (OUTPATIENT)
Dept: ADMINISTRATIVE | Facility: HOSPITAL | Age: 32
End: 2017-11-27

## 2017-11-27 DIAGNOSIS — Z12.31 ENCOUNTER FOR MAMMOGRAM TO ESTABLISH BASELINE MAMMOGRAM: Primary | ICD-10-CM

## 2017-12-07 ENCOUNTER — HOSPITAL ENCOUNTER (OUTPATIENT)
Dept: MAMMOGRAPHY | Facility: HOSPITAL | Age: 32
Discharge: HOME | End: 2017-12-07
Admitting: FAMILY MEDICINE

## 2017-12-07 DIAGNOSIS — Z12.31 ENCOUNTER FOR MAMMOGRAM TO ESTABLISH BASELINE MAMMOGRAM: ICD-10-CM

## 2017-12-07 PROCEDURE — G0202 SCR MAMMO BI INCL CAD: HCPCS

## 2017-12-26 DIAGNOSIS — Z00.00 ROUTINE GENERAL MEDICAL EXAMINATION AT A HEALTH CARE FACILITY: Primary | ICD-10-CM

## 2017-12-28 ENCOUNTER — LAB (OUTPATIENT)
Dept: SPORTS MEDICINE | Facility: CLINIC | Age: 32
End: 2017-12-28

## 2017-12-28 DIAGNOSIS — Z00.00 ROUTINE GENERAL MEDICAL EXAMINATION AT A HEALTH CARE FACILITY: ICD-10-CM

## 2017-12-30 LAB
ALBUMIN SERPL-MCNC: 4.5 G/DL (ref 3.5–5.2)
ALBUMIN/GLOB SERPL: 1.4 G/DL
ALP SERPL-CCNC: 83 U/L (ref 39–117)
ALT SERPL-CCNC: 15 U/L (ref 1–33)
APPEARANCE UR: ABNORMAL
AST SERPL-CCNC: 15 U/L (ref 1–32)
BACTERIA #/AREA URNS HPF: ABNORMAL /HPF
BACTERIA UR CULT: NORMAL
BACTERIA UR CULT: NORMAL
BASOPHILS # BLD AUTO: 0.02 10*3/MM3 (ref 0–0.2)
BASOPHILS NFR BLD AUTO: 0.2 % (ref 0–1.5)
BILIRUB SERPL-MCNC: 0.3 MG/DL (ref 0.1–1.2)
BILIRUB UR QL STRIP: NEGATIVE
BUN SERPL-MCNC: 18 MG/DL (ref 6–20)
BUN/CREAT SERPL: 21.2 (ref 7–25)
CALCIUM SERPL-MCNC: 9.6 MG/DL (ref 8.6–10.5)
CASTS URNS MICRO: ABNORMAL
CASTS URNS QL MICRO: PRESENT /LPF
CHLORIDE SERPL-SCNC: 108 MMOL/L (ref 98–107)
CHOLEST SERPL-MCNC: 157 MG/DL (ref 0–200)
CHOLEST/HDLC SERPL: 2.96 {RATIO}
CO2 SERPL-SCNC: 24.5 MMOL/L (ref 22–29)
COLOR UR: YELLOW
CREAT SERPL-MCNC: 0.85 MG/DL (ref 0.57–1)
CRYSTALS URNS MICRO: ABNORMAL
EOSINOPHIL # BLD AUTO: 0.22 10*3/MM3 (ref 0–0.7)
EOSINOPHIL NFR BLD AUTO: 2 % (ref 0.3–6.2)
EPI CELLS #/AREA URNS HPF: ABNORMAL /HPF
ERYTHROCYTE [DISTWIDTH] IN BLOOD BY AUTOMATED COUNT: 15.1 % (ref 11.7–13)
GLOBULIN SER CALC-MCNC: 3.3 GM/DL
GLUCOSE SERPL-MCNC: 95 MG/DL (ref 65–99)
GLUCOSE UR QL: NEGATIVE
HCT VFR BLD AUTO: 39.1 % (ref 35.6–45.5)
HDLC SERPL-MCNC: 53 MG/DL (ref 40–60)
HGB BLD-MCNC: 12.4 G/DL (ref 11.9–15.5)
HGB UR QL STRIP: ABNORMAL
IMM GRANULOCYTES # BLD: 0.02 10*3/MM3 (ref 0–0.03)
IMM GRANULOCYTES NFR BLD: 0.2 % (ref 0–0.5)
KETONES UR QL STRIP: NEGATIVE
LDLC SERPL CALC-MCNC: 93 MG/DL (ref 0–100)
LEUKOCYTE ESTERASE UR QL STRIP: ABNORMAL
LYMPHOCYTES # BLD AUTO: 2.02 10*3/MM3 (ref 0.9–4.8)
LYMPHOCYTES NFR BLD AUTO: 18.4 % (ref 19.6–45.3)
MCH RBC QN AUTO: 27.9 PG (ref 26.9–32)
MCHC RBC AUTO-ENTMCNC: 31.7 G/DL (ref 32.4–36.3)
MCV RBC AUTO: 88.1 FL (ref 80.5–98.2)
MICRO URNS: ABNORMAL
MONOCYTES # BLD AUTO: 0.66 10*3/MM3 (ref 0.2–1.2)
MONOCYTES NFR BLD AUTO: 6 % (ref 5–12)
MUCOUS THREADS URNS QL MICRO: PRESENT /HPF
NEUTROPHILS # BLD AUTO: 8.01 10*3/MM3 (ref 1.9–8.1)
NEUTROPHILS NFR BLD AUTO: 73.2 % (ref 42.7–76)
NITRITE UR QL STRIP: NEGATIVE
PH UR STRIP: 5 [PH] (ref 5–7.5)
PLATELET # BLD AUTO: 298 10*3/MM3 (ref 140–500)
POTASSIUM SERPL-SCNC: 4.7 MMOL/L (ref 3.5–5.2)
PROT SERPL-MCNC: 7.8 G/DL (ref 6–8.5)
PROT UR QL STRIP: ABNORMAL
RBC # BLD AUTO: 4.44 10*6/MM3 (ref 3.9–5.2)
RBC #/AREA URNS HPF: ABNORMAL /HPF
SODIUM SERPL-SCNC: 146 MMOL/L (ref 136–145)
SP GR UR: >=1.03 (ref 1–1.03)
T4 FREE SERPL-MCNC: 1.18 NG/DL (ref 0.93–1.7)
TRIGL SERPL-MCNC: 53 MG/DL (ref 0–150)
TSH SERPL DL<=0.005 MIU/L-ACNC: 2.08 MIU/ML (ref 0.27–4.2)
UNIDENT CRYS URNS QL MICRO: PRESENT /LPF
URINALYSIS REFLEX: ABNORMAL
UROBILINOGEN UR STRIP-MCNC: 1 MG/DL (ref 0.2–1)
VLDLC SERPL CALC-MCNC: 10.6 MG/DL (ref 5–40)
WBC # BLD AUTO: 10.95 10*3/MM3 (ref 4.5–10.7)
WBC #/AREA URNS HPF: >30 /HPF
YEAST #/AREA URNS HPF: PRESENT /HPF

## 2017-12-31 ENCOUNTER — RESULTS ENCOUNTER (OUTPATIENT)
Dept: SPORTS MEDICINE | Facility: CLINIC | Age: 32
End: 2017-12-31

## 2017-12-31 DIAGNOSIS — Z00.00 ROUTINE GENERAL MEDICAL EXAMINATION AT A HEALTH CARE FACILITY: ICD-10-CM

## 2018-01-04 ENCOUNTER — OFFICE VISIT (OUTPATIENT)
Dept: SPORTS MEDICINE | Facility: CLINIC | Age: 33
End: 2018-01-04

## 2018-01-04 VITALS
HEART RATE: 61 BPM | WEIGHT: 229 LBS | SYSTOLIC BLOOD PRESSURE: 116 MMHG | HEIGHT: 66 IN | DIASTOLIC BLOOD PRESSURE: 84 MMHG | OXYGEN SATURATION: 99 % | BODY MASS INDEX: 36.8 KG/M2

## 2018-01-04 DIAGNOSIS — R82.90 ABNORMAL URINALYSIS: ICD-10-CM

## 2018-01-04 DIAGNOSIS — D72.829 LEUKOCYTOSIS, UNSPECIFIED TYPE: ICD-10-CM

## 2018-01-04 DIAGNOSIS — Z00.00 PREVENTATIVE HEALTH CARE: Primary | ICD-10-CM

## 2018-01-04 PROCEDURE — 99395 PREV VISIT EST AGE 18-39: CPT | Performed by: FAMILY MEDICINE

## 2018-01-04 NOTE — PROGRESS NOTES
"Sena Min is here today for an annual physical exam.       - Planning weight loss and asking about suggestions.   -  Works as  for construction company, works in dirt lab.    I have reviewed the patient's medical, family, and social history in detail and updated the computerized patient record.    Screening history:  Colonoscopy - n/a  Breast cancer, Pap/pelvic - per GYN  Metabolic - last year    Health Maintenance   Topic Date Due   • PAP SMEAR  03/30/2017   • TDAP/TD VACCINES (2 - Td) 05/04/2027   • INFLUENZA VACCINE  Addressed       Review of Systems   Constitutional: Negative for activity change, appetite change, chills, diaphoresis, fatigue, fever and unexpected weight change.   HENT: Negative for congestion, ear pain, postnasal drip, rhinorrhea, sinus pressure, sneezing, sore throat and trouble swallowing.    Eyes: Negative for visual disturbance.   Respiratory: Negative for cough, chest tightness, shortness of breath and wheezing.    Cardiovascular: Negative for chest pain and palpitations.   Gastrointestinal: Negative for abdominal pain, blood in stool, nausea and vomiting.   Endocrine: Negative for cold intolerance, polydipsia, polyphagia and polyuria.   Genitourinary: Negative for dysuria, flank pain, frequency, hematuria and urgency.   Musculoskeletal: Negative for arthralgias, back pain, joint swelling and myalgias.   Skin: Negative for rash.   Allergic/Immunologic: Negative for environmental allergies.   Neurological: Negative for dizziness, syncope, weakness, numbness and headaches.   Hematological: Negative for adenopathy. Does not bruise/bleed easily.   Psychiatric/Behavioral: Negative for agitation, decreased concentration, dysphoric mood, sleep disturbance and suicidal ideas. The patient is not nervous/anxious.        /84  Pulse 61  Ht 167.6 cm (66\")  Wt 104 kg (229 lb)  SpO2 99%  BMI 36.96 kg/m2     Physical Exam    Vital signs reviewed.  General appearance: No " acute distress  Eyes: conjunctiva clear without erythema; pupils equally round and reactive  ENT: external ears and nose normal; hearing normal, oropharynx clear  Neck: supple; no thyromegaly  CV: normal rate and rhythm; no peripheral edema  Respiratory: normal respiratory effort; lungs clear to auscultation bilaterally  MSK: normal gait and station; no focal joint deformity or swelling  Skin: no rash or wounds; normal turgor  Neuro: cranial nerves 2-12 grossly intact; normal sensation to light touch  Psych: mood and affect normal; recent and remote memory intact    Results Encounter on 12/31/2017   Component Date Value Ref Range Status   • WBC 12/28/2017 10.95* 4.50 - 10.70 10*3/mm3 Final   • RBC 12/28/2017 4.44  3.90 - 5.20 10*6/mm3 Final   • Hemoglobin 12/28/2017 12.4  11.9 - 15.5 g/dL Final   • Hematocrit 12/28/2017 39.1  35.6 - 45.5 % Final   • MCV 12/28/2017 88.1  80.5 - 98.2 fL Final   • MCH 12/28/2017 27.9  26.9 - 32.0 pg Final   • MCHC 12/28/2017 31.7* 32.4 - 36.3 g/dL Final   • RDW 12/28/2017 15.1* 11.7 - 13.0 % Final   • Platelets 12/28/2017 298  140 - 500 10*3/mm3 Final   • Neutrophil Rel % 12/28/2017 73.2  42.7 - 76.0 % Final   • Lymphocyte Rel % 12/28/2017 18.4* 19.6 - 45.3 % Final   • Monocyte Rel % 12/28/2017 6.0  5.0 - 12.0 % Final   • Eosinophil Rel % 12/28/2017 2.0  0.3 - 6.2 % Final   • Basophil Rel % 12/28/2017 0.2  0.0 - 1.5 % Final   • Neutrophils Absolute 12/28/2017 8.01  1.90 - 8.10 10*3/mm3 Final   • Lymphocytes Absolute 12/28/2017 2.02  0.90 - 4.80 10*3/mm3 Final   • Monocytes Absolute 12/28/2017 0.66  0.20 - 1.20 10*3/mm3 Final   • Eosinophils Absolute 12/28/2017 0.22  0.00 - 0.70 10*3/mm3 Final   • Basophils Absolute 12/28/2017 0.02  0.00 - 0.20 10*3/mm3 Final   • Immature Granulocyte Rel % 12/28/2017 0.2  0.0 - 0.5 % Final   • Immature Grans Absolute 12/28/2017 0.02  0.00 - 0.03 10*3/mm3 Final   • Glucose 12/28/2017 95  65 - 99 mg/dL Final   • BUN 12/28/2017 18  6 - 20 mg/dL Final   •  Creatinine 12/28/2017 0.85  0.57 - 1.00 mg/dL Final   • eGFR Non African Am 12/28/2017 78  >60 mL/min/1.73 Final   • eGFR African Am 12/28/2017 95  >60 mL/min/1.73 Final   • BUN/Creatinine Ratio 12/28/2017 21.2  7.0 - 25.0 Final   • Sodium 12/28/2017 146* 136 - 145 mmol/L Final   • Potassium 12/28/2017 4.7  3.5 - 5.2 mmol/L Final   • Chloride 12/28/2017 108* 98 - 107 mmol/L Final   • Total CO2 12/28/2017 24.5  22.0 - 29.0 mmol/L Final   • Calcium 12/28/2017 9.6  8.6 - 10.5 mg/dL Final   • Total Protein 12/28/2017 7.8  6.0 - 8.5 g/dL Final   • Albumin 12/28/2017 4.50  3.50 - 5.20 g/dL Final   • Globulin 12/28/2017 3.3  gm/dL Final   • A/G Ratio 12/28/2017 1.4  g/dL Final   • Total Bilirubin 12/28/2017 0.3  0.1 - 1.2 mg/dL Final   • Alkaline Phosphatase 12/28/2017 83  39 - 117 U/L Final   • AST (SGOT) 12/28/2017 15  1 - 32 U/L Final   • ALT (SGPT) 12/28/2017 15  1 - 33 U/L Final   • Total Cholesterol 12/28/2017 157  0 - 200 mg/dL Final   • Triglycerides 12/28/2017 53  0 - 150 mg/dL Final   • HDL Cholesterol 12/28/2017 53  40 - 60 mg/dL Final   • VLDL Cholesterol 12/28/2017 10.6  5 - 40 mg/dL Final   • LDL Cholesterol  12/28/2017 93  0 - 100 mg/dL Final   • Chol/HDL Ratio 12/28/2017 2.96   Final   • TSH 12/28/2017 2.080  0.270 - 4.200 mIU/mL Final   • Free T4 12/28/2017 1.18  0.93 - 1.70 ng/dL Final   • Specific Gravity, UA 12/28/2017      >=1.030* 1.005 - 1.030 Final   • pH, UA 12/28/2017 5.0  5.0 - 7.5 Final   • Color, UA 12/28/2017 Yellow  Yellow Final   • Appearance, UA 12/28/2017 Turbid* Clear Final   • Leukocytes, UA 12/28/2017 2+* Negative Final   • Protein 12/28/2017 1+* Negative/Trace Final   • Glucose, UA 12/28/2017 Negative  Negative Final   • Ketones 12/28/2017 Negative  Negative Final   • Blood, UA 12/28/2017 3+* Negative Final   • Bilirubin, UA 12/28/2017 Negative  Negative Final   • Urobilinogen, UA 12/28/2017 1.0  0.2 - 1.0 mg/dL Final   • Nitrite, UA 12/28/2017 Negative  Negative Final   • Microscopic  Examination 12/28/2017 See below:   Final    Microscopic was indicated and was performed.   • Urinalysis Reflex 12/28/2017 Comment   Final    This specimen has reflexed to a Urine Culture.   • WBC, UA 12/28/2017 >30* 0 - 5 /hpf Final   • RBC, UA 12/28/2017 3-10* 0 - 2 /hpf Final   • Epithelial Cells (non renal) 12/28/2017 0-10  0 - 10 /hpf Final   • Casts 12/28/2017 Present* None seen /lpf Final   • Cast Type 12/28/2017 Hyaline casts  N/A Final   • Crystals, UA 12/28/2017 Present* N/A /lpf Final   • Crystal Type 12/28/2017 Calcium Oxalate  N/A Final   • Mucus, UA 12/28/2017 Present  Not Estab. /hpf Final   • Bacteria, UA 12/28/2017 Few  None seen/Few /hpf Final   • Yeast, UA 12/28/2017 Present* None seen /hpf Final   • Urine Culture 12/28/2017 Final report   Final   • Result 1 12/28/2017 Comment   Final    Comment: Mixed urogenital deepti  Greater than 100,000 colony forming units per mL       She was on menses when she gave the urine sample    Sena was seen today for annual exam.    Diagnoses and all orders for this visit:    Preventative health care    Leukocytosis, unspecified type  -     CBC & Differential    Abnormal urinalysis  -     UA / M With / Rflx Culture([facility] ONLY) - Urine, Clean Catch    1.  We'll repeat CBC and UA.  2.  Discussed with the patient about Lose It ! Nayeli for weight loss    Encourage healthy diet and exercise.  Encourage patient to stay up to date on screening examinations as indicated based on age and risk factors.    EMR Dragon/Transcription disclaimer:    Much of this encounter note is an electronic transcription/translation of spoken language to printed text.  The electronic translation of spoken language may permit erroneous, or at times, nonsensical words or phrases to be inadvertently transcribed.  Although I have reviewed the note for such errors some may still exist.

## 2018-01-06 LAB
APPEARANCE UR: ABNORMAL
BACTERIA #/AREA URNS HPF: ABNORMAL /HPF
BACTERIA UR CULT: NORMAL
BACTERIA UR CULT: NORMAL
BASOPHILS # BLD AUTO: 0.01 10*3/MM3 (ref 0–0.2)
BASOPHILS NFR BLD AUTO: 0.1 % (ref 0–1.5)
BILIRUB UR QL STRIP: NEGATIVE
COLOR UR: YELLOW
CRYSTALS URNS MICRO: ABNORMAL
EOSINOPHIL # BLD AUTO: 0.2 10*3/MM3 (ref 0–0.7)
EOSINOPHIL NFR BLD AUTO: 2.2 % (ref 0.3–6.2)
EPI CELLS #/AREA URNS HPF: >10 /HPF
ERYTHROCYTE [DISTWIDTH] IN BLOOD BY AUTOMATED COUNT: 14.9 % (ref 11.7–13)
GLUCOSE UR QL: NEGATIVE
HCT VFR BLD AUTO: 37.8 % (ref 35.6–45.5)
HGB BLD-MCNC: 12.1 G/DL (ref 11.9–15.5)
HGB UR QL STRIP: ABNORMAL
IMM GRANULOCYTES # BLD: 0 10*3/MM3 (ref 0–0.03)
IMM GRANULOCYTES NFR BLD: 0 % (ref 0–0.5)
KETONES UR QL STRIP: NEGATIVE
LEUKOCYTE ESTERASE UR QL STRIP: ABNORMAL
LYMPHOCYTES # BLD AUTO: 1.63 10*3/MM3 (ref 0.9–4.8)
LYMPHOCYTES NFR BLD AUTO: 18.2 % (ref 19.6–45.3)
MCH RBC QN AUTO: 28.1 PG (ref 26.9–32)
MCHC RBC AUTO-ENTMCNC: 32 G/DL (ref 32.4–36.3)
MCV RBC AUTO: 87.9 FL (ref 80.5–98.2)
MICRO URNS: ABNORMAL
MONOCYTES # BLD AUTO: 0.48 10*3/MM3 (ref 0.2–1.2)
MONOCYTES NFR BLD AUTO: 5.4 % (ref 5–12)
MUCOUS THREADS URNS QL MICRO: PRESENT /HPF
NEUTROPHILS # BLD AUTO: 6.62 10*3/MM3 (ref 1.9–8.1)
NEUTROPHILS NFR BLD AUTO: 74.1 % (ref 42.7–76)
NITRITE UR QL STRIP: NEGATIVE
PH UR STRIP: 6.5 [PH] (ref 5–7.5)
PLATELET # BLD AUTO: 301 10*3/MM3 (ref 140–500)
PROT UR QL STRIP: ABNORMAL
RBC # BLD AUTO: 4.3 10*6/MM3 (ref 3.9–5.2)
RBC #/AREA URNS HPF: ABNORMAL /HPF
SP GR UR: 1.02 (ref 1–1.03)
UNIDENT CRYS URNS QL MICRO: PRESENT /LPF
URINALYSIS REFLEX: ABNORMAL
UROBILINOGEN UR STRIP-MCNC: 0.2 MG/DL (ref 0.2–1)
WBC # BLD AUTO: 8.94 10*3/MM3 (ref 4.5–10.7)
WBC #/AREA URNS HPF: >30 /HPF

## 2018-07-16 RX ORDER — PANTOPRAZOLE SODIUM 40 MG/1
TABLET, DELAYED RELEASE ORAL
Qty: 90 TABLET | Refills: 0 | Status: SHIPPED | OUTPATIENT
Start: 2018-07-16 | End: 2018-10-13 | Stop reason: SDUPTHER

## 2018-10-11 ENCOUNTER — TRANSCRIBE ORDERS (OUTPATIENT)
Dept: ADMINISTRATIVE | Facility: HOSPITAL | Age: 33
End: 2018-10-11

## 2018-10-11 DIAGNOSIS — Z12.39 SCREENING BREAST EXAMINATION: Primary | ICD-10-CM

## 2018-10-15 RX ORDER — PANTOPRAZOLE SODIUM 40 MG/1
TABLET, DELAYED RELEASE ORAL
Qty: 90 TABLET | Refills: 0 | Status: SHIPPED | OUTPATIENT
Start: 2018-10-15 | End: 2019-01-11 | Stop reason: SDUPTHER

## 2018-12-11 ENCOUNTER — HOSPITAL ENCOUNTER (OUTPATIENT)
Dept: MAMMOGRAPHY | Facility: HOSPITAL | Age: 33
Discharge: HOME | End: 2018-12-11
Admitting: FAMILY MEDICINE

## 2018-12-11 DIAGNOSIS — Z12.39 SCREENING BREAST EXAMINATION: ICD-10-CM

## 2018-12-11 PROCEDURE — 77067 SCR MAMMO BI INCL CAD: CPT

## 2018-12-11 PROCEDURE — 77063 BREAST TOMOSYNTHESIS BI: CPT

## 2019-01-11 RX ORDER — PANTOPRAZOLE SODIUM 40 MG/1
TABLET, DELAYED RELEASE ORAL
Qty: 90 TABLET | Refills: 0 | Status: SHIPPED | OUTPATIENT
Start: 2019-01-11 | End: 2019-04-06 | Stop reason: SDUPTHER

## 2019-02-22 ENCOUNTER — OFFICE VISIT (OUTPATIENT)
Dept: SPORTS MEDICINE | Facility: CLINIC | Age: 34
End: 2019-02-22

## 2019-02-22 VITALS
HEIGHT: 66 IN | BODY MASS INDEX: 36.96 KG/M2 | OXYGEN SATURATION: 98 % | DIASTOLIC BLOOD PRESSURE: 70 MMHG | SYSTOLIC BLOOD PRESSURE: 112 MMHG | HEART RATE: 73 BPM | WEIGHT: 230 LBS

## 2019-02-22 DIAGNOSIS — Z00.00 PREVENTATIVE HEALTH CARE: Primary | ICD-10-CM

## 2019-02-22 DIAGNOSIS — J02.0 PHARYNGITIS DUE TO STREPTOCOCCUS SPECIES: ICD-10-CM

## 2019-02-22 DIAGNOSIS — D64.9 LOW HEMOGLOBIN: ICD-10-CM

## 2019-02-22 PROCEDURE — 99395 PREV VISIT EST AGE 18-39: CPT | Performed by: FAMILY MEDICINE

## 2019-02-22 RX ORDER — AZITHROMYCIN 250 MG/1
TABLET, FILM COATED ORAL
Qty: 6 TABLET | Refills: 0 | OUTPATIENT
Start: 2019-02-22 | End: 2019-11-07

## 2019-02-22 NOTE — PROGRESS NOTES
"Sena Min is here today for an annual physical exam.     Recently treated for strep still with red sore throat although not \"white patches\". No f/c. No sinus drainge     Gives blood, last two times \"iron was low\". She usually gives q 8 weeks. No change in menses, no melena or BRBPR.            I have reviewed the patient's medical, family, and social history in detail and updated the computerized patient record.    Screening history:  Colonoscopy - n/a  Breast cancer, Pap/pelvic - per GYN  Metabolic - last year    Health Maintenance   Topic Date Due   • PAP SMEAR  03/30/2017   • ANNUAL PHYSICAL  01/05/2019   • TDAP/TD VACCINES (2 - Td) 05/04/2027   • INFLUENZA VACCINE  Addressed       Review of Systems   Constitutional: Negative.    HENT: Positive for sore throat.    Eyes: Negative.    Respiratory: Negative.    Cardiovascular: Negative.    Gastrointestinal: Negative.    Endocrine: Negative.    Genitourinary: Negative.    Musculoskeletal: Negative.    Skin: Negative.    Allergic/Immunologic: Negative.    Neurological: Negative.    Hematological: Negative.    Psychiatric/Behavioral: Negative.        /70   Pulse 73   Ht 167.6 cm (65.98\")   Wt 104 kg (230 lb)   SpO2 98%   BMI 37.14 kg/m²      Physical Exam    Vital signs reviewed.  General appearance: No acute distress  Eyes: conjunctiva clear without erythema; pupils equally round and reactive  ENT: external ears and nose normal; hearing normal, oropharynx clear  Neck: supple; no thyromegaly  CV: normal rate and rhythm; no peripheral edema  Respiratory: normal respiratory effort; lungs clear to auscultation bilaterally  MSK: normal gait and station; no focal joint deformity or swelling  Skin: no rash or wounds; normal turgor  Neuro: cranial nerves 2-12 grossly intact; normal sensation to light touch  Psych: mood and affect normal; recent and remote memory intact    No visits with results within 2 Week(s) from this visit.   Latest known visit " with results is:   Admission on 02/07/2019, Discharged on 02/07/2019   Component Date Value Ref Range Status   • Rapid Influenza A Ag 02/07/2019 Negative  Negative Final   • Rapid Influenza B Ag 02/07/2019 Negative  Negative Final   • Internal Control 02/07/2019 Passed  Passed Final   • Lot Number 02/07/2019 [id number]   Final   • Expiration Date 02/07/2019 [id number]   Final   • Rapid Strep A Screen 02/07/2019 Negative  Negative, VALID, INVALID, Not Performed Final   • Internal Control 02/07/2019 Passed  Passed Final   • Lot Number 02/07/2019 [id number]   Final   • Expiration Date 02/07/2019 [id number]   Final         Current Outpatient Medications:   •  azithromycin (ZITHROMAX) 250 MG tablet, Take 2 tablets the first day, then 1 tablet daily for 4 days., Disp: 6 tablet, Rfl: 0  •  pantoprazole (PROTONIX) 40 MG EC tablet, TAKE 1 TABLET BY MOUTH DAILY, Disp: 90 tablet, Rfl: 0    Sena was seen today for annual exam.    Diagnoses and all orders for this visit:    Preventative health care  -     CBC & Differential  -     Comprehensive Metabolic Panel  -     Lipid Panel With / Chol / HDL Ratio  -     T4, Free  -     TSH  -     UA / M With / Rflx Culture([facility] ONLY) - Urine, Clean Catch    Pharyngitis due to Streptococcus species  -     azithromycin (ZITHROMAX) 250 MG tablet; Take 2 tablets the first day, then 1 tablet daily for 4 days.    Low hemoglobin  -     Iron Profile  -     Vitamin B12  -     Folate  -     CBC & Differential        Encourage healthy diet and exercise.  Encourage patient to stay up to date on screening examinations as indicated based on age and risk factors.    EMR Dragon/Transcription disclaimer:    Much of this encounter note is an electronic transcription/translation of spoken language to printed text.  The electronic translation of spoken language may permit erroneous, or at times, nonsensical words or phrases to be inadvertently transcribed.  Although I have reviewed the note for such errors  some may still exist.

## 2019-02-23 LAB
ALBUMIN SERPL-MCNC: 4.1 G/DL (ref 3.5–5.2)
ALBUMIN/GLOB SERPL: 1.6 G/DL
ALP SERPL-CCNC: 66 U/L (ref 39–117)
ALT SERPL-CCNC: 14 U/L (ref 1–33)
APPEARANCE UR: CLEAR
AST SERPL-CCNC: 15 U/L (ref 1–32)
BACTERIA #/AREA URNS HPF: ABNORMAL /HPF
BASOPHILS # BLD AUTO: 0.02 10*3/MM3 (ref 0–0.2)
BASOPHILS NFR BLD AUTO: 0.2 % (ref 0–1.5)
BILIRUB SERPL-MCNC: 0.3 MG/DL (ref 0.1–1.2)
BILIRUB UR QL STRIP: NEGATIVE
BUN SERPL-MCNC: 16 MG/DL (ref 6–20)
BUN/CREAT SERPL: 24.2 (ref 7–25)
CALCIUM SERPL-MCNC: 9.2 MG/DL (ref 8.6–10.5)
CASTS URNS MICRO: ABNORMAL
CASTS URNS QL MICRO: PRESENT /LPF
CHLORIDE SERPL-SCNC: 102 MMOL/L (ref 98–107)
CHOLEST SERPL-MCNC: 145 MG/DL (ref 0–200)
CHOLEST/HDLC SERPL: 2.59 {RATIO}
CO2 SERPL-SCNC: 24.1 MMOL/L (ref 22–29)
COLOR UR: YELLOW
CREAT SERPL-MCNC: 0.66 MG/DL (ref 0.57–1)
EOSINOPHIL # BLD AUTO: 0.15 10*3/MM3 (ref 0–0.4)
EOSINOPHIL NFR BLD AUTO: 1.8 % (ref 0.3–6.2)
EPI CELLS #/AREA URNS HPF: ABNORMAL /HPF
ERYTHROCYTE [DISTWIDTH] IN BLOOD BY AUTOMATED COUNT: 16.9 % (ref 12.3–15.4)
FOLATE SERPL-MCNC: 4.41 NG/ML (ref 4.78–24.2)
GLOBULIN SER CALC-MCNC: 2.6 GM/DL
GLUCOSE SERPL-MCNC: 83 MG/DL (ref 65–99)
GLUCOSE UR QL: NEGATIVE
HCT VFR BLD AUTO: 37.7 % (ref 34–46.6)
HDLC SERPL-MCNC: 56 MG/DL (ref 40–60)
HGB BLD-MCNC: 11.2 G/DL (ref 12–15.9)
HGB UR QL STRIP: ABNORMAL
IMM GRANULOCYTES # BLD AUTO: 0.05 10*3/MM3 (ref 0–0.05)
IMM GRANULOCYTES NFR BLD AUTO: 0.6 % (ref 0–0.5)
IRON SATN MFR SERPL: 9 % (ref 20–50)
IRON SERPL-MCNC: 36 MCG/DL (ref 37–145)
KETONES UR QL STRIP: NEGATIVE
LDLC SERPL CALC-MCNC: 80 MG/DL (ref 0–100)
LEUKOCYTE ESTERASE UR QL STRIP: NEGATIVE
LYMPHOCYTES # BLD AUTO: 1.54 10*3/MM3 (ref 0.7–3.1)
LYMPHOCYTES NFR BLD AUTO: 18.1 % (ref 19.6–45.3)
MCH RBC QN AUTO: 26.2 PG (ref 26.6–33)
MCHC RBC AUTO-ENTMCNC: 29.7 G/DL (ref 31.5–35.7)
MCV RBC AUTO: 88.1 FL (ref 79–97)
MICRO URNS: ABNORMAL
MONOCYTES # BLD AUTO: 0.54 10*3/MM3 (ref 0.1–0.9)
MONOCYTES NFR BLD AUTO: 6.3 % (ref 5–12)
MUCOUS THREADS URNS QL MICRO: PRESENT /HPF
NEUTROPHILS # BLD AUTO: 6.23 10*3/MM3 (ref 1.4–7)
NEUTROPHILS NFR BLD AUTO: 73 % (ref 42.7–76)
NITRITE UR QL STRIP: NEGATIVE
NRBC BLD AUTO-RTO: 0 /100 WBC (ref 0–0)
PH UR STRIP: 6 [PH] (ref 5–7.5)
PLATELET # BLD AUTO: 259 10*3/MM3 (ref 140–450)
POTASSIUM SERPL-SCNC: 4.4 MMOL/L (ref 3.5–5.2)
PROT SERPL-MCNC: 6.7 G/DL (ref 6–8.5)
PROT UR QL STRIP: NEGATIVE
RBC # BLD AUTO: 4.28 10*6/MM3 (ref 3.77–5.28)
RBC #/AREA URNS HPF: ABNORMAL /HPF
SODIUM SERPL-SCNC: 139 MMOL/L (ref 136–145)
SP GR UR: 1.02 (ref 1–1.03)
T4 FREE SERPL-MCNC: 1.07 NG/DL (ref 0.93–1.7)
TIBC SERPL-MCNC: 380 MCG/DL
TRIGL SERPL-MCNC: 44 MG/DL (ref 0–150)
TSH SERPL DL<=0.005 MIU/L-ACNC: 1.96 MIU/ML (ref 0.27–4.2)
UIBC SERPL-MCNC: 344 MCG/DL
URINALYSIS REFLEX: ABNORMAL
UROBILINOGEN UR STRIP-MCNC: 0.2 MG/DL (ref 0.2–1)
VIT B12 SERPL-MCNC: 727 PG/ML (ref 211–946)
VLDLC SERPL CALC-MCNC: 8.8 MG/DL (ref 5–40)
WBC # BLD AUTO: 8.53 10*3/MM3 (ref 3.4–10.8)
WBC #/AREA URNS HPF: ABNORMAL /HPF

## 2019-02-26 ENCOUNTER — TELEPHONE (OUTPATIENT)
Dept: SPORTS MEDICINE | Facility: CLINIC | Age: 34
End: 2019-02-26

## 2019-02-26 NOTE — TELEPHONE ENCOUNTER
Left vmail notifying pt of results and recommendations    ----- Message from Kodi Bro MD sent at 2/26/2019  3:47 PM EST -----  Labs look good with exception of low iron. Recommend ferrous sulfate 325 mg 1 bid for one month then repeat cbc and iron panel.

## 2019-04-08 RX ORDER — PANTOPRAZOLE SODIUM 40 MG/1
TABLET, DELAYED RELEASE ORAL
Qty: 90 TABLET | Refills: 3 | Status: SHIPPED | OUTPATIENT
Start: 2019-04-08 | End: 2020-04-06

## 2019-04-15 ENCOUNTER — OFFICE VISIT (OUTPATIENT)
Dept: SPORTS MEDICINE | Facility: CLINIC | Age: 34
End: 2019-04-15

## 2019-04-15 VITALS
HEART RATE: 63 BPM | WEIGHT: 232 LBS | BODY MASS INDEX: 37.28 KG/M2 | OXYGEN SATURATION: 99 % | SYSTOLIC BLOOD PRESSURE: 124 MMHG | HEIGHT: 66 IN | TEMPERATURE: 98.1 F | DIASTOLIC BLOOD PRESSURE: 72 MMHG

## 2019-04-15 DIAGNOSIS — M23.41 LOOSE BODY IN KNEE, RIGHT KNEE: Primary | ICD-10-CM

## 2019-04-15 DIAGNOSIS — M94.261 CHONDROMALACIA OF RIGHT KNEE: ICD-10-CM

## 2019-04-15 DIAGNOSIS — S83.271D COMPLEX TEAR OF LATERAL MENISCUS OF RIGHT KNEE AS CURRENT INJURY, SUBSEQUENT ENCOUNTER: ICD-10-CM

## 2019-04-15 PROCEDURE — 99213 OFFICE O/P EST LOW 20 MIN: CPT | Performed by: FAMILY MEDICINE

## 2019-04-15 RX ORDER — ETODOLAC 400 MG/1
800 TABLET, EXTENDED RELEASE ORAL DAILY
Qty: 60 TABLET | Refills: 2 | OUTPATIENT
Start: 2019-04-15 | End: 2019-11-07

## 2019-04-15 NOTE — PROGRESS NOTES
"Sena is a 33 y.o. year old female evaluation of a problem that is new to this examiner.    Chief Complaint   Patient presents with   • Knee Pain     Right - x last week - NKI        History of Present Illness   HPI   Patient has a known history of chondromalacia patella along with lateral meniscal tear and para meniscal cyst and loose bodies in the right knee.  Patient has been treated in the past with intra-articular steroid injection and anti-inflammatories.  Last week patient had another episode of right knee swelling and pain.  Swelling and pain has improved however this is a recurring issue for her and she would like to consider the possibility of surgical intervention.  She has not had any franky locking but she has had sensation of almost giving out.  If she sees an orthopedic surgeon she would like to see Dr. Renteria in [place].    I have reviewed the patient's medical, family, and social history in detail and updated the computerized patient record.    Review of Systems   Constitutional: Negative for fever.   Musculoskeletal:        Per HPI   Skin: Negative for wound.   Neurological: Negative for numbness.   All other systems reviewed and are negative.      /72   Pulse 63   Temp 98.1 °F (36.7 °C)   Ht 167.6 cm (65.98\")   Wt 105 kg (232 lb)   SpO2 99%   BMI 37.46 kg/m²      Physical Exam   Constitutional: She is oriented to person, place, and time. She appears well-developed and well-nourished.   HENT:   Head: Normocephalic and atraumatic.   Eyes: Conjunctivae and EOM are normal. Pupils are equal, round, and reactive to light.   Cardiovascular:   No peripheral edema   Pulmonary/Chest: Effort normal.   Musculoskeletal:   Right knee with mild effusion, no erythema or heat.  Patient has patellofemoral crepitus on flexion-extension.  Positive lateral Reyna.  Patient has good range of motion except for pain at the end of flexion.   Neurological: She is alert and oriented to person, place, and " time.   Skin: Skin is warm and dry.   Psychiatric: She has a normal mood and affect. Her behavior is normal.   Vitals reviewed.  Reviewed her MRI images from 2/16/2017 showing the above findings as mentioned in HPI.      Current Outpatient Medications:   •  azithromycin (ZITHROMAX) 250 MG tablet, Take 2 tablets the first day, then 1 tablet daily for 4 days., Disp: 6 tablet, Rfl: 0  •  etodolac XL (LODINE XL) 400 MG 24 hr tablet, Take 2 tablets by mouth Daily., Disp: 60 tablet, Rfl: 2  •  pantoprazole (PROTONIX) 40 MG EC tablet, TAKE 1 TABLET BY MOUTH DAILY, Disp: 90 tablet, Rfl: 3     Diagnoses and all orders for this visit:    Loose body in knee, right knee  -     Ambulatory Referral to Orthopedic Surgery  -     etodolac XL (LODINE XL) 400 MG 24 hr tablet; Take 2 tablets by mouth Daily.    Complex tear of lateral meniscus of right knee as current injury, subsequent encounter  -     Ambulatory Referral to Orthopedic Surgery  -     etodolac XL (LODINE XL) 400 MG 24 hr tablet; Take 2 tablets by mouth Daily.    Chondromalacia of right knee  -     Ambulatory Referral to Orthopedic Surgery  -     etodolac XL (LODINE XL) 400 MG 24 hr tablet; Take 2 tablets by mouth Daily.    Because her knee has improved since making this appointment will hold off on intra-articular steroid injection.  Will treat with oral anti-inflammatories and refer to orthopedic surgery.        EMR Dragon/Transcription disclaimer:    Much of this encounter note is an electronic transcription/translation of spoken language to printed text.  The electronic translation of spoken language may permit erroneous, or at times, nonsensical words or phrases to be inadvertently transcribed.  Although I have reviewed the note for such errors some may still exist.

## 2019-05-07 DIAGNOSIS — S83.271D COMPLEX TEAR OF LATERAL MENISCUS OF RIGHT KNEE AS CURRENT INJURY, SUBSEQUENT ENCOUNTER: Primary | ICD-10-CM

## 2019-05-09 ENCOUNTER — TELEPHONE (OUTPATIENT)
Dept: ORTHOPEDIC SURGERY | Facility: CLINIC | Age: 34
End: 2019-05-09

## 2019-05-14 ENCOUNTER — OFFICE VISIT (OUTPATIENT)
Dept: ORTHOPEDIC SURGERY | Facility: CLINIC | Age: 34
End: 2019-05-14

## 2019-05-14 VITALS — BODY MASS INDEX: 38.82 KG/M2 | HEIGHT: 65 IN | TEMPERATURE: 98.4 F | WEIGHT: 233 LBS

## 2019-05-14 DIAGNOSIS — G89.29 CHRONIC PAIN OF RIGHT KNEE: Primary | ICD-10-CM

## 2019-05-14 DIAGNOSIS — S83.241A TEAR OF MEDIAL MENISCUS OF RIGHT KNEE, CURRENT, UNSPECIFIED TEAR TYPE, INITIAL ENCOUNTER: ICD-10-CM

## 2019-05-14 DIAGNOSIS — M25.561 CHRONIC PAIN OF RIGHT KNEE: Primary | ICD-10-CM

## 2019-05-14 PROCEDURE — 73562 X-RAY EXAM OF KNEE 3: CPT | Performed by: ORTHOPAEDIC SURGERY

## 2019-05-14 PROCEDURE — 99213 OFFICE O/P EST LOW 20 MIN: CPT | Performed by: ORTHOPAEDIC SURGERY

## 2019-05-14 RX ORDER — SODIUM FLUORIDE 6 MG/ML
PASTE, DENTIFRICE DENTAL
COMMUNITY
Start: 2019-05-13 | End: 2019-11-07

## 2019-05-14 NOTE — PROGRESS NOTES
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  2 years ago she had similar symptoms which resolved conservatively.  Recently she is done anti-inflammatories ice strengthening all of which she learned 2 years ago they have not relieved her symptoms.  Her current symptoms are described as moderate intermittent grinding aching with clicking swelling worse with standing walking better with anti-inflammatories ice and rest        Allergies: No Known Allergies    Medications:   Home Medications:  Current Outpatient Medications on File Prior to Visit   Medication Sig   • Ibuprofen (ADVIL PO) Take  by mouth As Needed.   • pantoprazole (PROTONIX) 40 MG EC tablet TAKE 1 TABLET BY MOUTH DAILY   • PREVIDENT 5000 BOOSTER PLUS 1.1 % paste    • azithromycin (ZITHROMAX) 250 MG tablet Take 2 tablets the first day, then 1 tablet daily for 4 days.   • etodolac XL (LODINE XL) 400 MG 24 hr tablet Take 2 tablets by mouth Daily.     No current facility-administered medications on file prior to visit.      Current Medications:  Scheduled Meds:  Continuous Infusions:  No current facility-administered medications for this visit.   PRN Meds:.    Past Medical History:   Diagnosis Date   • Anxiety    • Depression    • Factor 5 Leiden mutation, heterozygous (CMS/HCC)    • GERD (gastroesophageal reflux disease)         Past Surgical History:   Procedure Laterality Date   • LEG SURGERY  01/2016    plate removed   • WISDOM TOOTH EXTRACTION  2009        Social History     Occupational History   • Not on file   Tobacco Use   • Smoking  "status: Never Smoker   • Smokeless tobacco: Never Used   Substance and Sexual Activity   • Alcohol use: No   • Drug use: Defer   • Sexual activity: Defer    Social History     Social History Narrative   • Not on file        Family History   Problem Relation Age of Onset   • Breast cancer Mother    • Hypertension Mother    • Hypertension Father    • Factor V Leiden deficiency Father    • Factor V Leiden deficiency Sister    • Factor V Leiden deficiency Paternal Aunt    • Breast cancer Maternal Grandmother    • Diabetes Maternal Grandfather    • Alzheimer's disease Paternal Grandmother              Review of Systems: 14 point review of systems are remarkable for the pertinent positives listed in the chart by the patient the remainder negative I have reviewed and agree    Review of Systems      Physical Exam: 33 y.o. female  General Appearance:    Alert, cooperative, in no acute distress                 Vitals:    05/14/19 1205   Temp: 98.4 °F (36.9 °C)   Weight: 106 kg (233 lb)   Height: 165.1 cm (65\")   PainSc:   4      Patient is alert and read ×3 no acute distress appears her above-listed at height weight and age.  Affect is normal respiratory rate is normal unlabored. Heart rate regular rate rhythm, sclera, dentition and hearing are normal for the purpose of this exam.        Ortho Exam  Physical exam of the right  knee reveals no effusion no redness.  The patient does have tenderness about the medial l joint line.  No tenderness about the lateral l joint line.  A negative bounce home and a positive l medial Reyna.    Patient has a stable ligamentous exam.  The patient has a negative Lachman and negative anterior drawer and a negative pivot shift.  Quads are reasonable and symmetric bilaterally.  Calf is soft and nontender.  There is no overlying skin changes no lymphedema lymphadenopathy.  Patient has good hip range of motion full symmetric and asymptomatic and a normal ankle exam.  She has good distal pulses " and sensation distally is intact      Procedures             Radiology:   AP, Lateral and merchant views of the right knee  were ordered/reviewed to evauateknee pain.  I have compared to previous films she does have some lipping of her lateral femoral condyle on the right and some mild patellofemoral OA no acute bony pathology no significant significant change  Imaging Results (most recent)     Procedure Component Value Units Date/Time    XR Knee 3 View Right [[id number]] Resulted:  05/14/19 1133     Updated:  05/14/19 1137    Impression:       Ordering physician's impression is located in the Encounter Note dated 05/14/19. X-ray performed in the DR room.          Assessment/Plan:  Right knee pain she is done the conservative things that she did last time including anti-inflammatories ice rest strengthening stretching all with no lasting improvement plan is to proceed with an MRI to rule out a meniscus tear

## 2019-05-29 DIAGNOSIS — G89.29 CHRONIC PAIN OF RIGHT KNEE: ICD-10-CM

## 2019-05-29 DIAGNOSIS — M25.561 CHRONIC PAIN OF RIGHT KNEE: ICD-10-CM

## 2019-05-29 DIAGNOSIS — S83.241A TEAR OF MEDIAL MENISCUS OF RIGHT KNEE, CURRENT, UNSPECIFIED TEAR TYPE, INITIAL ENCOUNTER: ICD-10-CM

## 2019-05-30 ENCOUNTER — TELEPHONE (OUTPATIENT)
Dept: ORTHOPEDIC SURGERY | Facility: CLINIC | Age: 34
End: 2019-05-30

## 2019-06-17 ENCOUNTER — TELEPHONE (OUTPATIENT)
Dept: ORTHOPEDIC SURGERY | Facility: CLINIC | Age: 34
End: 2019-06-17

## 2019-06-17 ENCOUNTER — OFFICE VISIT (OUTPATIENT)
Dept: ORTHOPEDIC SURGERY | Facility: CLINIC | Age: 34
End: 2019-06-17

## 2019-06-17 VITALS — WEIGHT: 230 LBS | BODY MASS INDEX: 36.96 KG/M2 | HEIGHT: 66 IN

## 2019-06-17 DIAGNOSIS — S83.281D OTHER TEAR OF LATERAL MENISCUS OF RIGHT KNEE AS CURRENT INJURY, SUBSEQUENT ENCOUNTER: Primary | ICD-10-CM

## 2019-06-17 PROCEDURE — 99213 OFFICE O/P EST LOW 20 MIN: CPT | Performed by: ORTHOPAEDIC SURGERY

## 2019-06-17 NOTE — PROGRESS NOTES
"Knee MRI Follow Up      Patient: Sena Min        YOB: 1985            Chief Complaints: Knee pain right      History of Present Illness: The patient is here follow-up of an MRI of the knee MRI shows a tear of the anterior horn lateral meniscus with large para meniscal cyst.  She is bothered enough by this she wishes to proceed with arthroscopy she is heterozygous for factor V and will need coverage perioperatively      Physical Exam: 33 y.o. female  General Appearance:    Alert, cooperative, in no acute distress                   Vitals:    06/17/19 1537   Weight: 104 kg (230 lb)   Height: 167.6 cm (66\")        Patient is alert and read ×3 no acute distress appears her above-listed at height weight and age.  Affect is normal respiratory rate is normal unlabored. Heart rate regular rate rhythm, sclera, dentition and hearing are normal for the purpose of this exam.      Ortho Exam Physical exam of the right knee reveals no effusion no redness.  The patient does have tenderness about the lateral joint line.  No tenderness about the medial joint line.  A negative bounce home and a positive lateral Reyna.    Patient has a stable ligamentous exam.  The patient has a negative Lachman and negative anterior drawer and a negative pivot shift.  Quads are reasonable and symmetric bilaterally.  Calf is soft and nontender.  There is no overlying skin changes no lymphedema lymphadenopathy.  Patient has good hip range of motion full symmetric and asymptomatic and a normal ankle exam    Physical Exam: 33 y.o. female  General Appearance:    Alert, cooperative, in no acute distress                      Vitals:    06/17/19 1537   Weight: 104 kg (230 lb)   Height: 167.6 cm (66\")        Head:    Normocephalic, without obvious abnormality, atraumatic   Eyes:            conjunctivae and sclerae normal, no pallor, corneas clear,    Ears:    Ears appear intact with no abnormalities noted   Throat:   No oral " lesions, no thrush, oral mucosa moist   Neck:   No adenopathy, supple, trachea midline, no thyromegaly,    Back:     No kyphosis present, no scoliosis present, no skin lesions,      erythema or scars, no tenderness to percussion or                   palpation,   range of motion normal   Lungs:     Clear to auscultation,respirations regular, even and                  unlabored    Heart:    Regular rhythm and normal rate               Chest Wall:    No abnormalities observed   Right knee lateral meniscus tear plan is to proceed with arthroscopy she is heterozygous for factor V deficiency therefore we will cover her with Xarelto    Rectal:     Deferred   Extremities:    Moves all extremities well, no edema,   no cyanosis, no redness   Pulses:   Pulses palpable and equal bilaterally   Skin:   No bleeding, bruising or rash   Lymph nodes:   No palpable adenopathy   Neurologic:   Appears neurologic intact           MRI Results: Are as above I have reviewed the films myself and agree with the findings also reviewed prior x-rays which show some mild patellofemoral OA no acute pathology    Procedures      Assessment/Plan: Postoperatively for 2 weeks did discuss in detail risk benefits and alternatives The patient voiced understanding of the risks, benefits, and alternative forms of treatment that were discussed and the patient consents to proceed with the above listed surgery.  All risks, benefits and alternatives were discussed.  Risks including to but not exclusive to anesthetic complications, including death, MI, CVA, infection, bleeding DVT, fracture, residual pain and need for future surgery.  She understands these and agrees to proceed thank you

## 2019-06-17 NOTE — TELEPHONE ENCOUNTER
Patient was seen today by Dr. Baker.  She is recommending knee arthroscopy.  Patient does have a history of factor V (heterozygous).  She also has a history of cancer.  Will use Xarelto for DVT prophylaxis until the patient is full weightbearing

## 2019-06-18 ENCOUNTER — TELEPHONE (OUTPATIENT)
Dept: ORTHOPEDIC SURGERY | Facility: CLINIC | Age: 34
End: 2019-06-18

## 2019-06-18 NOTE — TELEPHONE ENCOUNTER
----- Message from Marisela Baker MD sent at 6/17/2019  3:57 PM EDT -----  Can you see if we need to cover her for her factor V

## 2019-06-19 ENCOUNTER — TELEPHONE (OUTPATIENT)
Dept: ORTHOPEDIC SURGERY | Facility: CLINIC | Age: 34
End: 2019-06-19

## 2019-06-19 PROBLEM — S83.281A TEAR OF LATERAL MENISCUS OF RIGHT KNEE, CURRENT: Status: ACTIVE | Noted: 2019-06-19

## 2019-06-21 ENCOUNTER — TELEPHONE (OUTPATIENT)
Dept: ORTHOPEDIC SURGERY | Facility: CLINIC | Age: 34
End: 2019-06-21

## 2019-07-01 ENCOUNTER — TELEPHONE (OUTPATIENT)
Dept: ORTHOPEDIC SURGERY | Facility: CLINIC | Age: 34
End: 2019-07-01

## 2019-07-01 NOTE — TELEPHONE ENCOUNTER
Do you have this and can you fill this out it would be from the date of surgery for approximately 4 weeks

## 2019-07-02 ENCOUNTER — TELEPHONE (OUTPATIENT)
Dept: ORTHOPEDIC SURGERY | Facility: CLINIC | Age: 34
End: 2019-07-02

## 2019-07-02 NOTE — TELEPHONE ENCOUNTER
Regarding: Visit Follow-Up Question  Contact: [phone]  ----- Message from Mychart, Generic sent at 7/2/2019  2:16 PM EDT -----    What are my non-surgical options for my knee?

## 2019-07-03 ENCOUNTER — TELEPHONE (OUTPATIENT)
Dept: ORTHOPEDIC SURGERY | Facility: CLINIC | Age: 34
End: 2019-07-03

## 2019-07-03 NOTE — TELEPHONE ENCOUNTER
Regarding: RE: Visit Follow-Up Question  Contact: [phone]  ----- Message from Mychart, Generic sent at 7/3/2019  8:27 AM EDT -----  MY CHART MESSAGE:    I am leaning towards wanting to take that option, and reevaluate the need for surgery later. But I do want to know more about risk, benefits, and long term outcome of both options.       Dr. Samuel Shetty recommends having injections and doing physical therapy.    What are my non-surgical options for my knee?

## 2019-07-05 ENCOUNTER — TELEPHONE (OUTPATIENT)
Dept: ORTHOPEDIC SURGERY | Facility: CLINIC | Age: 34
End: 2019-07-05

## 2019-07-05 NOTE — TELEPHONE ENCOUNTER
Regarding: RE: Visit Follow-Up Question  Contact: [phone]  ----- Message from Mychart, Generic sent at 7/5/2019 11:40 AM EDT -----    I have decided I want to try physical therapy first. I called the office to cancel the surgery. I've cancelled the appointment for pre-admissions testing when they called to remind me of the appointment.  ----- Message -----  From: Nuha HESS  Sent: 7/3/2019  8:05 AM EDT  To: Sena Min  Subject: RE: Visit Follow-Up Question  Dr. Samuel Shetty said that having injections and doing physical therapy.  Thanks,  Nuha    ----- Message -----     From: Sena Min     Sent: 7/2/2019  2:16 PM EDT       To: Marisela Baker MD  Subject: Visit Follow-Up Question    What are my non-surgical options for my knee?

## 2019-07-05 NOTE — TELEPHONE ENCOUNTER
Patient called in and said that she is wanting to wait on her sx and would just like to do PT. Please put in an order for PT.

## 2019-07-05 NOTE — TELEPHONE ENCOUNTER
Regarding: RE: Visit Follow-Up Question  Contact: [phone]  ----- Message from Mychart, Generic sent at 7/5/2019 11:40 AM EDT -----    I have decided I want to try physical therapy first. I called the office to cancel the surgery. I've cancelled the appointment for pre-admissions testing when they called to remind me of the appointment.  ----- Message -----  From: Nuha HESS  Sent: 7/3/2019  8:05 AM EDT  To: Sena Min  Subject: RE: Visit Follow-Up Question  Dr. Samuel Shetty said that having injections and doing physical therapy.  Thanks,  Nuha    ----- Message -----     From: Sena Min     Sent: 7/2/2019  2:16 PM EDT       To: Marisela Baker MD  Subject: Visit Follow-Up Question    What are my non-surgical options for my knee?

## 2019-07-08 ENCOUNTER — APPOINTMENT (OUTPATIENT)
Dept: PREADMISSION TESTING | Facility: HOSPITAL | Age: 34
End: 2019-07-08

## 2019-07-08 DIAGNOSIS — S83.281D OTHER TEAR OF LATERAL MENISCUS OF RIGHT KNEE AS CURRENT INJURY, SUBSEQUENT ENCOUNTER: Primary | ICD-10-CM

## 2019-07-12 ENCOUNTER — OFFICE VISIT (OUTPATIENT)
Dept: PHYSICAL THERAPY | Facility: CLINIC | Age: 34
End: 2019-07-12

## 2019-07-12 DIAGNOSIS — R26.2 DIFFICULTY IN WALKING: ICD-10-CM

## 2019-07-12 DIAGNOSIS — S83.281D ACUTE LATERAL MENISCUS TEAR OF RIGHT KNEE, SUBSEQUENT ENCOUNTER: Primary | ICD-10-CM

## 2019-07-12 DIAGNOSIS — M25.561 ACUTE PAIN OF RIGHT KNEE: ICD-10-CM

## 2019-07-12 PROCEDURE — 97161 PT EVAL LOW COMPLEX 20 MIN: CPT | Performed by: PHYSICAL THERAPIST

## 2019-07-12 PROCEDURE — G0283 ELEC STIM OTHER THAN WOUND: HCPCS | Performed by: PHYSICAL THERAPIST

## 2019-07-12 PROCEDURE — 97110 THERAPEUTIC EXERCISES: CPT | Performed by: PHYSICAL THERAPIST

## 2019-07-12 NOTE — PROGRESS NOTES
Physical Therapy Initial Evaluation and Plan of Care    Patient: Sena Min   : 1985  Diagnosis/ICD-10 Code:  Acute lateral meniscus tear of right knee, subsequent encounter [S83.281D]  Referring practitioner: Marisela Baker MD  Date of Initial Visit: 2019  Today's Date: 2019  Patient seen for 1 sessions           Subjective Questionnaire: LEFS: 62      Subjective Evaluation    History of Present Illness  Mechanism of injury: Patient reports that 2 years ago she started having pain and swelling in the right knee with insidious onset. She started PT after which seemed to help. About 3 months ago, she started having pain in the knee again. She went to her physician who sent her to PT. She responded well to PT in the past. Patient reports that she feels her knee ROM has been worse with bending the knee. She has had a fracture ankle in the past with plates and rods. The plate has been removed but the kelly is still in place.      Patient Occupation:  of life: good    Pain  Current pain ratin  At best pain ratin  At worst pain ratin  Location: R knee  Quality: dull ache  Relieving factors: medications, rest and heat  Aggravating factors: standing, ambulation, squatting, lifting and stairs  Progression: no change    Social Support  Lives in: multiple-level home    Treatments  Previous treatment: physical therapy  Patient Goals  Patient goals for therapy: decreased edema, decreased pain, increased motion, increased strength, improved balance, return to sport/leisure activities and independence with ADLs/IADLs  Patient goal: To reduce pain and get the knee better        Objective       Palpation   Left   Tenderness of the vastus lateralis.     Right Tenderness of the vastus lateralis.     Tenderness   Left Knee   Tenderness in the ITB.     Right Knee   Tenderness in the ITB, lateral joint line, patellar tendon and quadriceps tendon.     Active Range of Motion    Left Knee   Flexion: 125 degrees   Extension: 0 degrees     Right Knee   Flexion: 125 degrees   Extension: 0 degrees     Patellar Mobility   Left Knee Hypomobile in the left medial, left superior and left inferior patellar tendon(s).     Right Knee Hypomobile in the medial, superior and inferior patellar tendon(s).     Additional Patellar Mobility Details  Patient with lateral tilt of the BL patellae.     Strength/Myotome Testing     Left Hip   Planes of Motion   Flexion: 4+  Abduction: 4  Adduction: 4    Right Hip   Planes of Motion   Flexion: 4+  Abduction: 4  Adduction: 4    Left Knee   Flexion: 4  Extension: 4+    Right Knee   Flexion: 4  Extension: 4+    Left Ankle/Foot   Dorsiflexion: 5    Right Ankle/Foot   Dorsiflexion: 5    Tests     Right Knee   Positive Apley's distraction.     Additional Tests Details  Apley's distraction: relieves pain    Ambulation     Observational Gait   Decreased walking speed, stride length, right stance time and right step length.      Assessment & Plan     Assessment  Impairments: abnormal coordination, abnormal gait, abnormal muscle firing, abnormal muscle tone, abnormal or restricted ROM, activity intolerance, impaired physical strength, lacks appropriate home exercise program and pain with function  Assessment details: The patient is a 33 y.o. female who presents to physical therapy today for right meniscus tear. Upon initial evaluation, the patient demonstrates the following impairments: increased pain, increased muscle guarding, an antalgic gait pattern, decreased strength, decreased joint mobility, and decreased muscle firing. Due to these impairments, the patient is unable to stand or walk for long periods of time, squat, and climb stairs without an increase in symptoms. The patient would benefit from skilled PT services to address functional limitations and impairments and to improve patient quality of life.      Prognosis: good  Functional Limitations: lifting,  walking, standing, stooping and unable to perform repetitive tasks  Goals  Plan Goals: STG's: 2 weeks   1. Patient will report pain level at worse </= 4/10 for improved tolerance to ADLs and functional mobility  2. Patient will require <3 tactile or verbal cues for proper mechanics during completion of her HEP     LTG's: By Discharge  1. Patient will report pain levels at worst </= 2/10 for improved tolerance to ADLs and functional mobility  2. Patient will be able to ambulate unlimited distances without an assistive device and no increased pain  3. Patient will be able to complete single leg stance on stable surface with no UE support, with supervision for durations > 10 seconds on RLE   4. Patient will report improved levels of overall function as demonstrated by an increase in her reported level of function score on the LEFS to >/= 70/80    5. Patient will require no tactile or verbal cues for proper mechaics during completion of her HEP for final independence       Plan  Therapy options: will be seen for skilled physical therapy services  Planned modality interventions: cryotherapy, electrical stimulation/Russian stimulation, TENS and thermotherapy (hydrocollator packs)  Planned therapy interventions: ADL retraining, flexibility, functional ROM exercises, gait training, home exercise program, IADL retraining, joint mobilization, manual therapy, neuromuscular re-education, soft tissue mobilization, spinal/joint mobilization, strengthening, stretching and therapeutic activities  Duration in visits: 12  Treatment plan discussed with: patient        History # of Personal Factors and/or Comorbidities: LOW (0)  Examination of Body System(s): # of elements: LOW (1-2)  Clinical Presentation: STABLE   Clinical Decision Making: LOW       Timed:         Manual Therapy:         mins  64301;     Therapeutic Exercise:    10     mins  80090;     Neuromuscular Ramana:        mins  08323;    Therapeutic Activity:          mins   72492;     Gait Training:           mins  81963;     Ultrasound:          mins  14115;    Ionto                                   mins   84213  Self Care                            mins   70190  Canalith Repos         mins 58462      Un-Timed:  Electrical Stimulation:   15      mins  55414 ( );  Dry Needling          mins self-pay  Traction          mins 78190  Low Eval     25     Mins  01510  Mod Eval          Mins  20257  High Eval                            Mins  71610  Re-Eval                               mins  97058        Timed Treatment:   10   mins   Total Treatment:     50   mins    PT SIGNATURE: Jade Gracia PT   DATE TREATMENT INITIATED: 7/12/2019    Initial Certification  Certification Period: 10/10/2019  I certify that the therapy services are furnished while this patient is under my care.  The services outlined above are required by this patient, and will be reviewed every 90 days.     PHYSICIAN: Marisela Baker MD      DATE:     Please sign and return via fax to [phone].. Thank you, [facility].

## 2019-07-16 ENCOUNTER — OFFICE VISIT (OUTPATIENT)
Dept: PHYSICAL THERAPY | Facility: CLINIC | Age: 34
End: 2019-07-16

## 2019-07-16 DIAGNOSIS — R26.2 DIFFICULTY IN WALKING: ICD-10-CM

## 2019-07-16 DIAGNOSIS — S83.281D ACUTE LATERAL MENISCUS TEAR OF RIGHT KNEE, SUBSEQUENT ENCOUNTER: Primary | ICD-10-CM

## 2019-07-16 DIAGNOSIS — M25.561 ACUTE PAIN OF RIGHT KNEE: ICD-10-CM

## 2019-07-16 PROCEDURE — 97110 THERAPEUTIC EXERCISES: CPT | Performed by: PHYSICAL THERAPIST

## 2019-07-16 PROCEDURE — 97530 THERAPEUTIC ACTIVITIES: CPT | Performed by: PHYSICAL THERAPIST

## 2019-07-16 PROCEDURE — G0283 ELEC STIM OTHER THAN WOUND: HCPCS | Performed by: PHYSICAL THERAPIST

## 2019-07-16 NOTE — PROGRESS NOTES
Physical Therapy Daily Progress Note    VISIT#: 2    Subjective   Sena Min reports that she felt like the taping technique helped the right leg. She didn't notice any popping or clicking in the knee.  Pain Ratin        Objective     See Exercise, Manual, and Modality Logs for complete treatment.     Patient Education: orientation of the knee with standing, lower extremity correction with standing    Assessment & Plan     Assessment  Assessment details: The patient tolerated added exercises for tibiofemoral stability well today with no increase in pain or symptoms. Bilateral patellofemoral joints were taped today to reduce popping in the knees as well as for patellar stability.          Progress per Plan of Care and Progress strengthening /stabilization /functional activity            Timed:         Manual Therapy:    5     mins  72933;     Therapeutic Exercise:    25     mins  66605;     Neuromuscular Ramana:        mins  75954;    Therapeutic Activity:    10      mins  70644;     Gait Training:           mins  20630;     Ultrasound:          mins  47280;    Ionto                                   mins   19768  Self Care                            mins   43087  Canalith Repos                   mins  35557    Un-Timed:  Electrical Stimulation:    15     mins  54254 ( );  Dry Needling          mins self-pay  Traction          mins 16663  Low Eval          Mins  03877  Mod Eval          Mins  25331  High Eval                            Mins  25047  Re-Eval                               mins  20131    Timed Treatment:   40   mins   Total Treatment:     55   mins    Jade Gracia PT  Physical Therapist  [place] License # [id number]

## 2019-07-19 ENCOUNTER — OFFICE VISIT (OUTPATIENT)
Dept: PHYSICAL THERAPY | Facility: CLINIC | Age: 34
End: 2019-07-19

## 2019-07-19 DIAGNOSIS — R26.2 DIFFICULTY IN WALKING: ICD-10-CM

## 2019-07-19 DIAGNOSIS — M25.561 ACUTE PAIN OF RIGHT KNEE: ICD-10-CM

## 2019-07-19 DIAGNOSIS — S83.281D ACUTE LATERAL MENISCUS TEAR OF RIGHT KNEE, SUBSEQUENT ENCOUNTER: Primary | ICD-10-CM

## 2019-07-19 PROCEDURE — G0283 ELEC STIM OTHER THAN WOUND: HCPCS | Performed by: PHYSICAL THERAPIST

## 2019-07-19 PROCEDURE — 97110 THERAPEUTIC EXERCISES: CPT | Performed by: PHYSICAL THERAPIST

## 2019-07-19 PROCEDURE — 97530 THERAPEUTIC ACTIVITIES: CPT | Performed by: PHYSICAL THERAPIST

## 2019-07-19 NOTE — PROGRESS NOTES
Physical Therapy Daily Progress Note    VISIT#: 3    Subjective   Sena Min reports that she had to move a lot of concrete yesterday so her knee was sore after she did it. The patient reports that she put icy hot on her knee which made it feel much better.  Pain Ratin    Objective     See Exercise, Manual, and Modality Logs for complete treatment.     Patient Education: added exercises, taping technique    Assessment & Plan     Assessment  Assessment details: The patient tolerated added exercises well today even after a strenuous day at work the previous day. PT to continue with standing stability exercises for the lower extremity.          Progress per Plan of Care and Progress strengthening /stabilization /functional activity            Timed:         Manual Therapy:         mins  28874;     Therapeutic Exercise:    20     mins  77851;     Neuromuscular Ramana:        mins  47063;    Therapeutic Activity:     25     mins  06997;     Gait Training:           mins  22169;     Ultrasound:          mins  24080;    Ionto                                   mins   93334  Self Care                            mins   05865  Canalith Repos                   mins  08645    Un-Timed:  Electrical Stimulation:    15     mins  47210 ( );  Dry Needling          mins self-pay  Traction          mins 25774  Low Eval          Mins  75460  Mod Eval          Mins  40713  High Eval                            Mins  72492  Re-Eval                               mins  40554    Timed Treatment:   45   mins   Total Treatment:     60   mins    Jade Gracia, PT  Physical Therapist  [place] License # [id number]

## 2019-07-23 ENCOUNTER — OFFICE VISIT (OUTPATIENT)
Dept: PHYSICAL THERAPY | Facility: CLINIC | Age: 34
End: 2019-07-23

## 2019-07-23 DIAGNOSIS — R26.2 DIFFICULTY IN WALKING: ICD-10-CM

## 2019-07-23 DIAGNOSIS — S83.281D ACUTE LATERAL MENISCUS TEAR OF RIGHT KNEE, SUBSEQUENT ENCOUNTER: Primary | ICD-10-CM

## 2019-07-23 DIAGNOSIS — M25.561 ACUTE PAIN OF RIGHT KNEE: ICD-10-CM

## 2019-07-23 PROCEDURE — 97110 THERAPEUTIC EXERCISES: CPT | Performed by: PHYSICAL THERAPIST

## 2019-07-23 PROCEDURE — 97530 THERAPEUTIC ACTIVITIES: CPT | Performed by: PHYSICAL THERAPIST

## 2019-07-23 PROCEDURE — G0283 ELEC STIM OTHER THAN WOUND: HCPCS | Performed by: PHYSICAL THERAPIST

## 2019-07-23 NOTE — PROGRESS NOTES
Physical Therapy Daily Progress Note    VISIT#: 4    Subjective   Sena Min reports that her knee continues to do well with little to no pain.   Pain Ratin        Objective     See Exercise, Manual, and Modality Logs for complete treatment.     Patient Education: added exercises, exercise rationale    Assessment & Plan     Assessment  Assessment details: The patient demonstrated good quad control with terminal knee extensions today. The patient continues to tolerate added exercises well with no increase in symptoms. PT to continue focusing on quad control to reduce hyperextension in bilateral tibiofemoral joints.          Progress per Plan of Care and Progress strengthening /stabilization /functional activity            Timed:         Manual Therapy:         mins  30679;     Therapeutic Exercise:    25     mins  87534;     Neuromuscular Ramana:        mins  37718;    Therapeutic Activity:     15     mins  86605;     Gait Training:           mins  32469;     Ultrasound:          mins  83871;    Ionto                                   mins   05508  Self Care                            mins   73414  Canalith Repos                   mins  22186    Un-Timed:  Electrical Stimulation:    15     mins  91482 ( );  Dry Needling          mins self-pay  Traction          mins 09249  Low Eval          Mins  57923  Mod Eval          Mins  95286  High Eval                            Mins  23188  Re-Eval                               mins  52546    Timed Treatment:   40   mins   Total Treatment:     55   mins    Jade Gracia PT  Physical Therapist  [place] License # [id number]

## 2019-07-26 ENCOUNTER — OFFICE VISIT (OUTPATIENT)
Dept: PHYSICAL THERAPY | Facility: CLINIC | Age: 34
End: 2019-07-26

## 2019-07-26 DIAGNOSIS — S83.281D ACUTE LATERAL MENISCUS TEAR OF RIGHT KNEE, SUBSEQUENT ENCOUNTER: Primary | ICD-10-CM

## 2019-07-26 DIAGNOSIS — M25.561 ACUTE PAIN OF RIGHT KNEE: ICD-10-CM

## 2019-07-26 DIAGNOSIS — R26.2 DIFFICULTY IN WALKING: ICD-10-CM

## 2019-07-26 PROCEDURE — 97530 THERAPEUTIC ACTIVITIES: CPT | Performed by: PHYSICAL THERAPIST

## 2019-07-26 PROCEDURE — 97110 THERAPEUTIC EXERCISES: CPT | Performed by: PHYSICAL THERAPIST

## 2019-07-26 PROCEDURE — G0283 ELEC STIM OTHER THAN WOUND: HCPCS | Performed by: PHYSICAL THERAPIST

## 2019-07-26 PROCEDURE — 97140 MANUAL THERAPY 1/> REGIONS: CPT | Performed by: PHYSICAL THERAPIST

## 2019-07-26 NOTE — PROGRESS NOTES
Physical Therapy Daily Progress Note    VISIT#: 5    Subjective   Sena Min reports that she left the tape off yesterday. She is having some popping into the left knee.  Pain Ratin      Objective     See Exercise, Manual, and Modality Logs for complete treatment.     Patient Education: rest from taping technique, importance of proper shoe wear when going to [employer]    Assessment & Plan     Assessment  Assessment details: The patient demonstrated good quad control bilaterally with terminal knee extensions to reduce hyperextension with activity. Patient continues to demonstrate little to no pain with activity.          Progress per Plan of Care and Progress strengthening /stabilization /functional activity          Timed:         Manual Therapy:    10     mins  58797;     Therapeutic Exercise:    15     mins  19593;     Neuromuscular Ramana:        mins  16444;    Therapeutic Activity:     20     mins  53830;     Gait Training:           mins  20416;     Ultrasound:          mins  03897;    Ionto                                   mins   62708  Self Care                            mins   25288  Canalith Repos                   mins  92968    Un-Timed:  Electrical Stimulation:    15     mins  54419 ( );  Dry Needling          mins self-pay  Traction          mins 06336  Low Eval          Mins  56531  Mod Eval          Mins  67889  High Eval                            Mins  94936  Re-Eval                               mins  18949    Timed Treatment:   45   mins   Total Treatment:     60   mins    Jade Gracia PT  Physical Therapist  [place] License # [id number]

## 2019-07-30 ENCOUNTER — OFFICE VISIT (OUTPATIENT)
Dept: PHYSICAL THERAPY | Facility: CLINIC | Age: 34
End: 2019-07-30

## 2019-07-30 DIAGNOSIS — R26.2 DIFFICULTY IN WALKING: ICD-10-CM

## 2019-07-30 DIAGNOSIS — S83.281D ACUTE LATERAL MENISCUS TEAR OF RIGHT KNEE, SUBSEQUENT ENCOUNTER: Primary | ICD-10-CM

## 2019-07-30 DIAGNOSIS — M25.561 ACUTE PAIN OF RIGHT KNEE: ICD-10-CM

## 2019-07-30 PROCEDURE — 97140 MANUAL THERAPY 1/> REGIONS: CPT | Performed by: PHYSICAL THERAPIST

## 2019-07-30 PROCEDURE — 97530 THERAPEUTIC ACTIVITIES: CPT | Performed by: PHYSICAL THERAPIST

## 2019-07-30 PROCEDURE — 97110 THERAPEUTIC EXERCISES: CPT | Performed by: PHYSICAL THERAPIST

## 2019-07-30 PROCEDURE — G0283 ELEC STIM OTHER THAN WOUND: HCPCS | Performed by: PHYSICAL THERAPIST

## 2019-07-30 NOTE — PROGRESS NOTES
Physical Therapy Daily Progress Note    VISIT#: 6    Subjective   Sena Min reports that she stepped wrong this morning on her left knee and had pain for a couple of steps. Her knees did okay at [employer] besides swelling a little.  Pain Ratin    Objective     See Exercise, Manual, and Modality Logs for complete treatment.       Assessment & Plan     Assessment  Assessment details: The patient was able to tolerate all therapeutic exercise today despite having a slight increase in pain a couple of hours prior to session. Patient demonstrated hypomobility in the left patellofemoral joint which improved with joint mobilization technique.      Progress per Plan of Care and Progress strengthening /stabilization /functional activity            Timed:         Manual Therapy:    10     mins  66219;     Therapeutic Exercise:    12     mins  19111;     Neuromuscular Ramana:        mins  28427;    Therapeutic Activity:     20     mins  83011;     Gait Training:           mins  73921;     Ultrasound:          mins  18405;    Ionto                                   mins   97826  Self Care                            mins   43921  Canalith Repos                   mins  18915    Un-Timed:  Electrical Stimulation:    15     mins  81919 ( );  Dry Needling          mins self-pay  Traction          mins 49004  Low Eval          Mins  18593  Mod Eval          Mins  11405  High Eval                            Mins  63635  Re-Eval                               mins  86241    Timed Treatment:   42   mins   Total Treatment:     57   mins    Jade Gracia PT  Physical Therapist  [place] License # [id number]

## 2019-08-02 ENCOUNTER — OFFICE VISIT (OUTPATIENT)
Dept: PHYSICAL THERAPY | Facility: CLINIC | Age: 34
End: 2019-08-02

## 2019-08-02 DIAGNOSIS — R26.2 DIFFICULTY IN WALKING: ICD-10-CM

## 2019-08-02 DIAGNOSIS — M25.561 ACUTE PAIN OF RIGHT KNEE: ICD-10-CM

## 2019-08-02 DIAGNOSIS — S83.281D ACUTE LATERAL MENISCUS TEAR OF RIGHT KNEE, SUBSEQUENT ENCOUNTER: Primary | ICD-10-CM

## 2019-08-02 PROCEDURE — 97530 THERAPEUTIC ACTIVITIES: CPT | Performed by: PHYSICAL THERAPIST

## 2019-08-02 PROCEDURE — 97110 THERAPEUTIC EXERCISES: CPT | Performed by: PHYSICAL THERAPIST

## 2019-08-02 PROCEDURE — G0283 ELEC STIM OTHER THAN WOUND: HCPCS | Performed by: PHYSICAL THERAPIST

## 2019-08-02 NOTE — PROGRESS NOTES
Physical Therapy Daily Progress Note    VISIT#: 7    Subjective   Sena Min reports that her knee finally popped on Tuesday and it felt much better. She is having no pain today.  Pain Ratin    Objective     See Exercise, Manual, and Modality Logs for complete treatment.     Patient Education: safety with uneven ground    Assessment & Plan     Assessment  Assessment details: The patient tolerated an increase in therapeutic exercises well today with no increase in symptoms. Patient demonstrated good quad control with a decrease in hyperextension.        Progress per Plan of Care and Progress strengthening /stabilization /functional activity        Timed:         Manual Therapy:         mins  09796;     Therapeutic Exercise:    15     mins  22732;     Neuromuscular Ramana:        mins  02610;    Therapeutic Activity:     23     mins  83564;     Gait Training:           mins  49764;     Ultrasound:          mins  52254;    Ionto                                   mins   85613  Self Care                            mins   89511  Canalith Repos                   mins  99978    Un-Timed:  Electrical Stimulation:    15     mins  02931 ( );  Dry Needling          mins self-pay  Traction          mins 13775  Low Eval          Mins  69012  Mod Eval          Mins  66974  High Eval                            Mins  92258  Re-Eval                               mins  12360    Timed Treatment:   38   mins   Total Treatment:     53   mins    Jade Gracia PT  Physical Therapist  [place] License # [id number]

## 2019-08-09 ENCOUNTER — OFFICE VISIT (OUTPATIENT)
Dept: PHYSICAL THERAPY | Facility: CLINIC | Age: 34
End: 2019-08-09

## 2019-08-09 DIAGNOSIS — M25.561 ACUTE PAIN OF RIGHT KNEE: ICD-10-CM

## 2019-08-09 DIAGNOSIS — R26.2 DIFFICULTY IN WALKING: ICD-10-CM

## 2019-08-09 DIAGNOSIS — S83.281D ACUTE LATERAL MENISCUS TEAR OF RIGHT KNEE, SUBSEQUENT ENCOUNTER: Primary | ICD-10-CM

## 2019-08-09 PROCEDURE — 97530 THERAPEUTIC ACTIVITIES: CPT | Performed by: PHYSICAL THERAPIST

## 2019-08-09 PROCEDURE — 97110 THERAPEUTIC EXERCISES: CPT | Performed by: PHYSICAL THERAPIST

## 2019-08-09 PROCEDURE — G0283 ELEC STIM OTHER THAN WOUND: HCPCS | Performed by: PHYSICAL THERAPIST

## 2019-08-09 NOTE — PROGRESS NOTES
Physical Therapy Daily Progress Note    VISIT#: 8    Subjective   Sena Min reports that she has had a little bit of pain in her right knee cap. It feels like its the back of the knee cap deeper in the joint.  Pain Ratin    Objective     See Exercise, Manual, and Modality Logs for complete treatment.     Patient Education: paying attention to gait and ways to reduce limping, exercise progression    Assessment & Plan     Assessment  Assessment details: The patient tolerated exercise progression well today with no increase in symptoms. Patient was educated on importance of a normalized gait pattern to reduce strain on left lower extremity.        Progress per Plan of Care and Progress strengthening /stabilization /functional activity          Timed:         Manual Therapy:         mins  90415;     Therapeutic Exercise:    18     mins  52875;     Neuromuscular Ramana:        mins  45991;    Therapeutic Activity:     20     mins  73349;     Gait Training:           mins  69063;     Ultrasound:          mins  44053;    Ionto                                   mins   15320  Self Care                            mins   25676  Canalith Repos                   mins  91640    Un-Timed:  Electrical Stimulation:    15     mins  13011 ( );  Dry Needling          mins self-pay  Traction          mins 54043  Low Eval          Mins  77701  Mod Eval          Mins  56697  High Eval                            Mins  82442  Re-Eval                               mins  52517    Timed Treatment:   38   mins   Total Treatment:     53   mins    Jade Gracia PT  Physical Therapist  [place] License # [id number]

## 2019-08-16 ENCOUNTER — TRANSCRIBE ORDERS (OUTPATIENT)
Dept: PHYSICAL THERAPY | Facility: CLINIC | Age: 34
End: 2019-08-16

## 2019-08-16 ENCOUNTER — OFFICE VISIT (OUTPATIENT)
Dept: PHYSICAL THERAPY | Facility: CLINIC | Age: 34
End: 2019-08-16

## 2019-08-16 DIAGNOSIS — M25.561 ACUTE PAIN OF RIGHT KNEE: ICD-10-CM

## 2019-08-16 DIAGNOSIS — R26.2 DIFFICULTY IN WALKING: ICD-10-CM

## 2019-08-16 DIAGNOSIS — S83.281D ACUTE LATERAL MENISCUS TEAR OF RIGHT KNEE, SUBSEQUENT ENCOUNTER: Primary | ICD-10-CM

## 2019-08-16 DIAGNOSIS — S90.02XA CONTUSION OF LEFT ANKLE, INITIAL ENCOUNTER: Primary | ICD-10-CM

## 2019-08-16 PROCEDURE — 97110 THERAPEUTIC EXERCISES: CPT | Performed by: PHYSICAL THERAPIST

## 2019-08-16 PROCEDURE — 97535 SELF CARE MNGMENT TRAINING: CPT | Performed by: PHYSICAL THERAPIST

## 2019-08-16 PROCEDURE — G0283 ELEC STIM OTHER THAN WOUND: HCPCS | Performed by: PHYSICAL THERAPIST

## 2019-08-16 NOTE — PROGRESS NOTES
Physical Therapy Daily Progress Note/Discharge Summary    VISIT#: 9    Subjective   Sena Min reports that she fell off of a ladder at work and sprained her left ankle and bruised her big toe. She is going to be seen for her ankle in PT under a worker's comp case.  Pain Ratin    Objective       Active Range of Motion   Left Knee   Normal active range of motion    Right Knee   Normal active range of motion    Patellar Mobility   Left Knee Patellar tendons within functional limits include the medial, superior and inferior.     Right Knee Patellar tendons within functional limits include the medial, superior and inferior.     Strength/Myotome Testing     Lumbar   Left   Normal strength    Right   Normal strength       See Exercise, Manual, and Modality Logs for complete treatment.     Patient Education: HEP for knee, discharge summary for knee    Assessment & Plan     Assessment  Assessment details: The patient was an excellent candidate for physical therapy, making improvements in strength, joint mobility, ROM, and pain reduction. She is being discharged today due to meeting all therapy and personal goals. The patient is being discharged with an HEP for continuing self care. The patient's LEFS score reflects her knee only and not her work related ankle injury.    Goals  Plan Goals: STG's: 2 weeks   1. Patient will report pain level at worse </= 4/10 for improved tolerance to ADLs and functional mobility-MET  2. Patient will require <3 tactile or verbal cues for proper mechanics during completion of her HEP-MET    LTG's: By Discharge  1. Patient will report pain levels at worst </= 2/10 for improved tolerance to ADLs and functional mobility-MET  2. Patient will be able to ambulate unlimited distances without an assistive device and no increased pain-MET  3. Patient will be able to complete single leg stance on stable surface with no UE support, with supervision for durations > 10 seconds on RLE-MET   4.  Patient will report improved levels of overall function as demonstrated by an increase in her reported level of function score on the LEFS to >/= 70/80-MET    5. Patient will require no tactile or verbal cues for proper mechaics during completion of her HEP for final independence-MET       Plan  Plan details: Discharge with HEP        Progress per Plan of Care and Progress strengthening /stabilization /functional activity          Timed:         Manual Therapy:         mins  16811;     Therapeutic Exercise:    15     mins  52547;     Neuromuscular Ramana:        mins  70203;    Therapeutic Activity:          mins  41417;     Gait Training:           mins  61180;     Ultrasound:          mins  62456;    Ionto                                   mins   90111  Self Care                       10     mins   77010  Canalith Repos                   mins  79199    Un-Timed:  Electrical Stimulation:    15     mins  30422 ( );  Dry Needling          mins self-pay  Traction          mins 52090  Low Eval          Mins  30843  Mod Eval          Mins  94605  High Eval                            Mins  63340  Re-Eval                               mins  35806    Timed Treatment:   25   mins   Total Treatment:     40   mins    Jade Gracia PT  Physical Therapist  [place] License # [id number]

## 2019-08-26 ENCOUNTER — OFFICE VISIT (OUTPATIENT)
Dept: PHYSICAL THERAPY | Facility: CLINIC | Age: 34
End: 2019-08-26

## 2019-08-26 DIAGNOSIS — R26.2 DIFFICULTY IN WALKING INVOLVING ANKLE AND FOOT JOINT: ICD-10-CM

## 2019-08-26 DIAGNOSIS — S90.02XA CONTUSION OF LEFT ANKLE, INITIAL ENCOUNTER: Primary | ICD-10-CM

## 2019-08-26 DIAGNOSIS — M25.572 ACUTE LEFT ANKLE PAIN: ICD-10-CM

## 2019-08-26 PROCEDURE — 97161 PT EVAL LOW COMPLEX 20 MIN: CPT | Performed by: PHYSICAL THERAPIST

## 2019-08-26 PROCEDURE — 97140 MANUAL THERAPY 1/> REGIONS: CPT | Performed by: PHYSICAL THERAPIST

## 2019-08-26 PROCEDURE — 97110 THERAPEUTIC EXERCISES: CPT | Performed by: PHYSICAL THERAPIST

## 2019-08-26 NOTE — PROGRESS NOTES
Physical Therapy Initial Evaluation and Plan of Care    Patient: Sena Min   : 1985  Diagnosis/ICD-10 Code:  Contusion of left ankle, initial encounter [S90.02XA]  Referring practitioner: BENNY Winters  Date of Initial Visit: 2019  Today's Date: 2019  Patient seen for 1 sessions           Subjective Questionnaire: LEFS: 44      Subjective Evaluation    History of Present Illness  Date of onset: 2019  Mechanism of injury: Patient reports that she was on a ladder at work, when the ladder fell out from under her. She fell around 7-8 feet. After the fall, she noticed her left ankle and right big toe hurt and she landed on her back. She also had a cut on the right hand which required stitches. She cannot recall the way her ankle twisted. She went to urgent care following where they sent her to the ER for x-rays and stitches. There were no fractures in the left ankle. Since the incident, she is at work with full duty and no restrictions. She states that she has pain and instability when walking on uneven ground which is required for her job. She feels more stable in the ankle brace and her work boots versus regular tennis shoes. Pain is located in the left inner ankle.    Quality of life: good    Pain  Current pain ratin  At best pain ratin  At worst pain ratin  Location: left inner ankle  Quality: dull ache  Relieving factors: support  Aggravating factors: ambulation, lifting, squatting, stairs and standing  Progression: improved    Diagnostic Tests  X-ray: normal    Patient Goals  Patient goals for therapy: decreased edema, decreased pain, improved balance, increased motion, increased strength, independence with ADLs/IADLs and return to sport/leisure activities  Patient goal: To have less pain with walking and at work           Objective       Palpation   Left   Tenderness of the anterior tibialis, medial gastrocnemius and posterior tibialis.     Tenderness   Left  Ankle/Foot   Tenderness in the medial malleolus and posterior tibial tendon.     Active Range of Motion   Left Ankle/Foot   Dorsiflexion (ke): 0 degrees   Plantar flexion: WFL  Inversion: WFL  Eversion: WFL    Right Ankle/Foot   Dorsiflexion (ke): 10 degrees   Plantar flexion: WFL  Inversion: WFL  Eversion: WFL    Joint Play   Left Ankle/Foot  Hypomobile in the distal tibiofibular joint, talocrural joint and subtalar joint.     Strength/Myotome Testing     Left Ankle/Foot   Dorsiflexion: 4+  Inversion: 4+  Eversion: 4-  Great toe extension: 5    Right Ankle/Foot   Dorsiflexion: 5  Inversion: 5  Eversion: 5  Great toe extension: 5         Assessment & Plan     Assessment  Impairments: abnormal coordination, abnormal gait, abnormal muscle firing, abnormal muscle tone, abnormal or restricted ROM, activity intolerance, impaired physical strength, lacks appropriate home exercise program and pain with function  Assessment details: The patient is a 33 y.o. female who presents to physical therapy today for left ankle pain following a fall at work. Upon initial evaluation, the patient demonstrates the following impairments: increased pain, increased inflammation, an antalgic gait pattern, decreased strength, decreased joint mobility, and decreased ROM. Due to these impairments, the patient is unable to stand for long periods of time, walk for long periods of time, walk on uneven ground, and walk down the stairs without an increase in symptoms. The patient would benefit from skilled PT services to address functional limitations and impairments and to improve patient quality of life.      Prognosis: good  Functional Limitations: lifting, walking and standing  Goals  Plan Goals: STG's: 2 weeks   1. Patient will report pain level at worse </= 1/10 for improved tolerance to ADLs and functional mobility  2. Patient will require <3 tactile or verbal cues for proper mechanics during completion of her HEP     LTG's: By Discharge  1.  Patient will report pain levels at worst </= 1/10 with walking on uneven surfaces for improved tolerance to ADLs and functional mobility  2. Patient will be able to ambulate unlimited distances without an increase in pain  3. Patient will report improved levels of overall function as demonstrated by an increase in her reported level of function score on the LEFS   4. Patient will require no tactile or verbal cues for proper mechaics during completion of her HEP for final independence         Plan  Therapy options: will be seen for skilled physical therapy services  Planned modality interventions: cryotherapy, electrical stimulation/Russian stimulation, TENS and thermotherapy (hydrocollator packs)  Planned therapy interventions: balance/weight-bearing training, flexibility, functional ROM exercises, gait training, home exercise program, IADL retraining, joint mobilization, manual therapy, neuromuscular re-education, soft tissue mobilization, spinal/joint mobilization, strengthening, stretching and therapeutic activities  Duration in visits: 12  Treatment plan discussed with: patient        History # of Personal Factors and/or Comorbidities: LOW (0)  Examination of Body System(s): # of elements: LOW (1-2)  Clinical Presentation: STABLE   Clinical Decision Making: LOW       Timed:         Manual Therapy:    8     mins  03876;     Therapeutic Exercise:    8     mins  83537;     Neuromuscular Ramana:        mins  96514;    Therapeutic Activity:          mins  83726;     Gait Training:           mins  81468;     Ultrasound:          mins  53645;    Ionto                                   mins   54311  Self Care                            mins   84981  Canalith Repos         mins 02760      Un-Timed:  Electrical Stimulation:         mins  44868 ( );  Dry Needling          mins self-pay  Traction          mins 82318  Low Eval     25     Mins  81495  Mod Eval          Mins  53225  High Eval                             Mins  92644  Re-Eval                               mins  66661        Timed Treatment:   16   mins   Total Treatment:     50   mins    PT SIGNATURE: Jade Gracia, PT   DATE TREATMENT INITIATED: 8/26/2019    Initial Certification  Certification Period: 11/24/2019  I certify that the therapy services are furnished while this patient is under my care.  The services outlined above are required by this patient, and will be reviewed every 90 days.     PHYSICIAN: Ad Islas PA      DATE:     Please sign and return via fax to [phone].. Thank you, [facility].

## 2019-08-28 ENCOUNTER — OFFICE VISIT (OUTPATIENT)
Dept: PHYSICAL THERAPY | Facility: CLINIC | Age: 34
End: 2019-08-28

## 2019-08-28 DIAGNOSIS — S90.02XA CONTUSION OF LEFT ANKLE, INITIAL ENCOUNTER: Primary | ICD-10-CM

## 2019-08-28 DIAGNOSIS — R26.2 DIFFICULTY IN WALKING INVOLVING ANKLE AND FOOT JOINT: ICD-10-CM

## 2019-08-28 DIAGNOSIS — M25.572 ACUTE LEFT ANKLE PAIN: ICD-10-CM

## 2019-08-28 PROCEDURE — 97110 THERAPEUTIC EXERCISES: CPT | Performed by: PHYSICAL THERAPIST

## 2019-08-28 PROCEDURE — 97530 THERAPEUTIC ACTIVITIES: CPT | Performed by: PHYSICAL THERAPIST

## 2019-08-28 PROCEDURE — 97140 MANUAL THERAPY 1/> REGIONS: CPT | Performed by: PHYSICAL THERAPIST

## 2019-08-28 NOTE — PROGRESS NOTES
Physical Therapy Daily Progress Note    VISIT#: 2    Subjective   Sean Min reports that her ankles have been popping a lot. She had a little muscle soreness with the exercises last night.  Pain Ratin    Objective     See Exercise, Manual, and Modality Logs for complete treatment.     Patient Education: exercise rationale, importance of performing exercises on both ankles    Assessment & Plan     Assessment  Assessment details: The patient demonstrated a slight increase in inflammation of the left ankle as seen by her sock indentations. The patient tolerated added exercises well with mild muscle fatigue following controlled ankle exercises. Sidestepping exercise was performed at the counter for safety.        Progress per Plan of Care and Progress strengthening /stabilization /functional activity          Timed:         Manual Therapy:    15     mins  05851;     Therapeutic Exercise:    10     mins  64805;     Neuromuscular Ramana:        mins  81721;    Therapeutic Activity:     15     mins  01482;     Gait Training:           mins  87096;     Ultrasound:          mins  36435;    Ionto                                   mins   61004  Self Care                            mins   57726  Canalith Repos                   mins  67630    Un-Timed:  Electrical Stimulation:         mins  61693 ( );  Dry Needling          mins self-pay  Traction          mins 05505  Low Eval          Mins  84574  Mod Eval          Mins  97024  High Eval                            Mins  17590  Re-Eval                               mins  86605    Timed Treatment:   40   mins   Total Treatment:     52   mins    Jade Gracia PT  Physical Therapist  [place] License # [id number]

## 2019-09-03 ENCOUNTER — OFFICE VISIT (OUTPATIENT)
Dept: PHYSICAL THERAPY | Facility: CLINIC | Age: 34
End: 2019-09-03

## 2019-09-03 DIAGNOSIS — R26.2 DIFFICULTY IN WALKING: Primary | ICD-10-CM

## 2019-09-03 PROCEDURE — 97110 THERAPEUTIC EXERCISES: CPT | Performed by: PHYSICAL THERAPIST

## 2019-09-03 PROCEDURE — 97140 MANUAL THERAPY 1/> REGIONS: CPT | Performed by: PHYSICAL THERAPIST

## 2019-09-03 NOTE — PROGRESS NOTES
Physical Therapy Daily Progress Note      Patient: Sena Min   : 1985  Diagnosis/ICD-10 Code:  Difficulty in walking [R26.2]  Referring practitioner: BENNY Winters  Date of Initial Visit: Type: THERAPY  Noted: 2019  Today's Date: 9/3/2019  Patient seen for 3 sessions             Subjective Pt reports that she only seems to have issues with going down normal, deep steps.  Although, she says that she is getting better at it.  Pain Intensity (0-10): 0/10    Objective   See Exercise, Manual, and Modality Logs for complete treatment.       Assessment/Plan  Pt tolerated manual well.  She also tolerated progression of resistance and progression of foam pad for SLS well today.  Progress per Plan of Care           Timed:         Manual Therapy:    15    mins  51437;     Therapeutic Exercise:  30     mins  31628;     Neuromuscular Ramana:        mins  07194;    Therapeutic Activity:          mins  01126;     Gait Training:           mins  39180;     Ultrasound:          mins  34436;    Ionto                                   mins   94076  Self Care                            mins   85334      Un-Timed:  Electrical Stimulation:         mins  01225 ( );  Dry Needling          mins self-pay  Traction          mins 64798      Timed Treatment:   45   mins   Total Treatment:     55   mins    Ad Giron PTA  Physical Therapist

## 2019-09-05 ENCOUNTER — OFFICE VISIT (OUTPATIENT)
Dept: PHYSICAL THERAPY | Facility: CLINIC | Age: 34
End: 2019-09-05

## 2019-09-05 DIAGNOSIS — M25.572 ACUTE LEFT ANKLE PAIN: ICD-10-CM

## 2019-09-05 DIAGNOSIS — R26.2 DIFFICULTY IN WALKING: Primary | ICD-10-CM

## 2019-09-05 DIAGNOSIS — S90.02XA CONTUSION OF LEFT ANKLE, INITIAL ENCOUNTER: ICD-10-CM

## 2019-09-05 DIAGNOSIS — R26.2 DIFFICULTY IN WALKING INVOLVING ANKLE AND FOOT JOINT: ICD-10-CM

## 2019-09-05 PROCEDURE — 97140 MANUAL THERAPY 1/> REGIONS: CPT | Performed by: PHYSICAL THERAPIST

## 2019-09-05 PROCEDURE — 97530 THERAPEUTIC ACTIVITIES: CPT | Performed by: PHYSICAL THERAPIST

## 2019-09-05 PROCEDURE — 97112 NEUROMUSCULAR REEDUCATION: CPT | Performed by: PHYSICAL THERAPIST

## 2019-09-05 PROCEDURE — 97110 THERAPEUTIC EXERCISES: CPT | Performed by: PHYSICAL THERAPIST

## 2019-09-05 NOTE — PROGRESS NOTES
Physical Therapy Daily Progress Note    VISIT#: 4    Subjective   Sena Min reports that her pain has been low but when she climbs stairs her pain increases.  Pain Ratin    Objective     See Exercise, Manual, and Modality Logs for complete treatment.     Patient Education: POC    Assessment & Plan     Assessment  Assessment details: The patient was able to perform single leg stance on foam for 1 minute with moderate sway. Patient noted fatigue following single leg stance exercises. Patient continues to demonstrate hypomobility in the talocrural joint with DF.        Progress per Plan of Care and Progress strengthening /stabilization /functional activity          Timed:         Manual Therapy:    15     mins  17664;     Therapeutic Exercise:    10     mins  51608;     Neuromuscular Ramana:  8      mins  34345;    Therapeutic Activity:     15     mins  89754;     Gait Training:           mins  45424;     Ultrasound:          mins  72472;    Ionto                                   mins   58684  Self Care                            mins   18021  Canalith Repos                   mins  59272    Un-Timed:  Electrical Stimulation:         mins  65047 ( );  Dry Needling          mins self-pay  Traction          mins 07116  Low Eval          Mins  29566  Mod Eval          Mins  24916  High Eval                            Mins  37338  Re-Eval                               mins  60661    Timed Treatment:   48   mins   Total Treatment:     58   mins    Jade Gracia PT  Physical Therapist  [place] License # [id number]

## 2019-09-10 ENCOUNTER — OFFICE VISIT (OUTPATIENT)
Dept: PHYSICAL THERAPY | Facility: CLINIC | Age: 34
End: 2019-09-10

## 2019-09-10 DIAGNOSIS — R26.2 DIFFICULTY IN WALKING: Primary | ICD-10-CM

## 2019-09-10 DIAGNOSIS — S90.02XA CONTUSION OF LEFT ANKLE, INITIAL ENCOUNTER: ICD-10-CM

## 2019-09-10 DIAGNOSIS — M25.572 ACUTE LEFT ANKLE PAIN: ICD-10-CM

## 2019-09-10 PROCEDURE — 97110 THERAPEUTIC EXERCISES: CPT | Performed by: PHYSICAL THERAPIST

## 2019-09-10 PROCEDURE — 97112 NEUROMUSCULAR REEDUCATION: CPT | Performed by: PHYSICAL THERAPIST

## 2019-09-10 PROCEDURE — 97530 THERAPEUTIC ACTIVITIES: CPT | Performed by: PHYSICAL THERAPIST

## 2019-09-10 NOTE — PROGRESS NOTES
Physical Therapy Daily Progress Note    VISIT#: 5    Subjective   Sena Min reports that her ankle is doing pretty well.  Pain Ratin    Objective     See Exercise, Manual, and Modality Logs for complete treatment.     Patient Education: POC, HEP    Assessment & Plan     Assessment  Assessment details: The patient demonstrated an increase in talocrural DF today with no increase in symptoms. Patient has tolerated advanced balances exercises well with little to no sway.        Progress per Plan of Care and Progress strengthening /stabilization /functional activity          Timed:         Manual Therapy:         mins  54331;     Therapeutic Exercise:    15     mins  32574;     Neuromuscular Ramana:    8    mins  90454;    Therapeutic Activity:     17     mins  33261;     Gait Training:           mins  86594;     Ultrasound:          mins  18064;    Ionto                                   mins   15390  Self Care                            mins   96047  Canalith Repos                   mins  83092    Un-Timed:  Electrical Stimulation:         mins  10709 ( );  Dry Needling          mins self-pay  Traction          mins 23573  Low Eval          Mins  52939  Mod Eval          Mins  44310  High Eval                            Mins  19802  Re-Eval                               mins  37459    Timed Treatment:   40   mins   Total Treatment:     40   mins    Jade Gracia PT  Physical Therapist  [place] License # [id number]

## 2019-09-27 ENCOUNTER — DOCUMENTATION (OUTPATIENT)
Dept: PHYSICAL THERAPY | Facility: CLINIC | Age: 34
End: 2019-09-27

## 2019-09-27 NOTE — PROGRESS NOTES
Discharge Summary  Discharge Summary from Physical Therapy Report      Number of Visits: 4     Discharge Status of Patient: Discharged    Goals: All Met    Discharge Plan: Continue with current home exercise program as instructed    Comments the patient is being discharged due to completion of current PT program and being released from MD. Patient was given an HEP for continuing self care.    Date of Discharge 9/27/19        Jade Gracia PT  Physical Therapist

## 2020-04-06 RX ORDER — PANTOPRAZOLE SODIUM 40 MG/1
TABLET, DELAYED RELEASE ORAL
Qty: 90 TABLET | Refills: 0 | Status: SHIPPED | OUTPATIENT
Start: 2020-04-06 | End: 2020-06-29

## 2020-06-29 RX ORDER — PANTOPRAZOLE SODIUM 40 MG/1
TABLET, DELAYED RELEASE ORAL
Qty: 90 TABLET | Refills: 2 | Status: SHIPPED | OUTPATIENT
Start: 2020-06-29 | End: 2021-02-04 | Stop reason: SDUPTHER

## 2020-07-06 ENCOUNTER — TRANSCRIBE ORDERS (OUTPATIENT)
Dept: ADMINISTRATIVE | Facility: HOSPITAL | Age: 35
End: 2020-07-06

## 2020-12-08 RX ORDER — SERTRALINE HYDROCHLORIDE 25 MG/1
25 TABLET, FILM COATED ORAL DAILY
Qty: 30 TABLET | Refills: 2 | Status: SHIPPED | OUTPATIENT
Start: 2020-12-08 | End: 2021-02-04 | Stop reason: SDUPTHER

## 2021-02-01 ENCOUNTER — APPOINTMENT (OUTPATIENT)
Dept: GENERAL RADIOLOGY | Facility: HOSPITAL | Age: 36
End: 2021-02-01

## 2021-02-01 PROCEDURE — 73140 X-RAY EXAM OF FINGER(S): CPT | Performed by: FAMILY MEDICINE

## 2021-02-04 ENCOUNTER — OFFICE VISIT (OUTPATIENT)
Dept: SPORTS MEDICINE | Facility: CLINIC | Age: 36
End: 2021-02-04

## 2021-02-04 VITALS
HEIGHT: 66 IN | OXYGEN SATURATION: 98 % | DIASTOLIC BLOOD PRESSURE: 68 MMHG | HEART RATE: 53 BPM | SYSTOLIC BLOOD PRESSURE: 114 MMHG | TEMPERATURE: 98.1 F | BODY MASS INDEX: 35.68 KG/M2 | WEIGHT: 222 LBS

## 2021-02-04 DIAGNOSIS — Z11.59 NEED FOR HEPATITIS C SCREENING TEST: ICD-10-CM

## 2021-02-04 DIAGNOSIS — Z00.00 PREVENTATIVE HEALTH CARE: Primary | ICD-10-CM

## 2021-02-04 DIAGNOSIS — Z23 NEEDS FLU SHOT: ICD-10-CM

## 2021-02-04 PROCEDURE — 90686 IIV4 VACC NO PRSV 0.5 ML IM: CPT | Performed by: FAMILY MEDICINE

## 2021-02-04 PROCEDURE — 99395 PREV VISIT EST AGE 18-39: CPT | Performed by: FAMILY MEDICINE

## 2021-02-04 PROCEDURE — 90471 IMMUNIZATION ADMIN: CPT | Performed by: FAMILY MEDICINE

## 2021-02-04 RX ORDER — PANTOPRAZOLE SODIUM 40 MG/1
40 TABLET, DELAYED RELEASE ORAL DAILY
Qty: 90 TABLET | Refills: 3 | Status: SHIPPED | OUTPATIENT
Start: 2021-02-04 | End: 2022-02-14

## 2021-02-04 RX ORDER — SERTRALINE HYDROCHLORIDE 25 MG/1
25 TABLET, FILM COATED ORAL DAILY
Qty: 90 TABLET | Refills: 3 | Status: SHIPPED | OUTPATIENT
Start: 2021-02-04 | End: 2022-02-14

## 2021-02-04 NOTE — PROGRESS NOTES
Sena Min is here today for an annual physical exam.     No specific concerns     PHQ-2 Depression Screening  Little interest or pleasure in doing things? 0   Feeling down, depressed, or hopeless? 0   PHQ-2 Total Score 0         I have reviewed the patient's medical, family, and social history in detail and updated the computerized patient record.    Screening history:  Colonoscopy -n/a   Breast cancer, Pap/pelvic - per GYN  Metabolic - last year    Health Maintenance   Topic Date Due   • Annual Gynecologic Pelvic and Breast Exam  1986   • HEPATITIS C SCREENING  03/30/2017   • PAP SMEAR  03/30/2017   • ANNUAL PHYSICAL  02/23/2020   • INFLUENZA VACCINE  08/01/2020   • TDAP/TD VACCINES (2 - Td) 05/04/2027   • Pneumococcal Vaccine 0-64  Aged Out   • MENINGOCOCCAL VACCINE  Aged Out       Review of Systems   Constitutional: Negative for activity change, appetite change, chills, diaphoresis, fatigue, fever and unexpected weight change.   HENT: Negative for congestion, ear pain, postnasal drip, rhinorrhea, sinus pressure, sneezing, sore throat and trouble swallowing.    Eyes: Negative for visual disturbance.   Respiratory: Negative for cough, chest tightness, shortness of breath and wheezing.    Cardiovascular: Negative for chest pain and palpitations.   Gastrointestinal: Negative for abdominal pain, blood in stool, nausea and vomiting.   Endocrine: Negative for cold intolerance, polydipsia, polyphagia and polyuria.   Genitourinary: Negative for dysuria, flank pain, frequency, hematuria and urgency.   Musculoskeletal: Negative for arthralgias, back pain, joint swelling and myalgias.   Skin: Negative for rash.   Allergic/Immunologic: Negative for environmental allergies.   Neurological: Negative for dizziness, syncope, weakness, numbness and headaches.   Hematological: Negative for adenopathy. Does not bruise/bleed easily.   Psychiatric/Behavioral: Negative for agitation, decreased concentration,  "dysphoric mood, sleep disturbance and suicidal ideas. The patient is not nervous/anxious.        /68 (BP Location: Left arm, Patient Position: Sitting, Cuff Size: Adult)   Pulse 53   Temp 98.1 °F (36.7 °C) (Oral)   Ht 167.6 cm (65.98\")   Wt 101 kg (222 lb)   SpO2 98%   BMI 35.85 kg/m²      Physical Exam    Vital signs reviewed.  General appearance: No acute distress  Eyes: conjunctiva clear without erythema; pupils equally round and reactive  ENT: external ears normal; hearing normal  Neck: supple; no thyromegaly  CV: normal rate and rhythm; no peripheral edema  Respiratory: normal respiratory effort; lungs clear to auscultation bilaterally  MSK: normal gait and station; no focal joint deformity or swelling  Skin: no rash or wounds; normal turgor  Neuro: cranial nerves 2-12 grossly intact; normal sensation to light touch  Psych: mood and affect normal; recent and remote memory intact    No visits with results within 2 Week(s) from this visit.   Latest known visit with results is:   Office Visit on 02/22/2019   Component Date Value Ref Range Status   • TIBC 02/22/2019 380  mcg/dL Final   • UIBC 02/22/2019 344  mcg/dL Final   • Iron 02/22/2019 36* 37 - 145 mcg/dL Final   • Iron Saturation 02/22/2019 9* 20 - 50 % Final   • Vitamin B-12 02/22/2019 727  211 - 946 pg/mL Final   • Folate 02/22/2019 4.41* 4.78 - 24.20 ng/mL Final   • WBC 02/22/2019 8.53  3.40 - 10.80 10*3/mm3 Final   • RBC 02/22/2019 4.28  3.77 - 5.28 10*6/mm3 Final   • Hemoglobin 02/22/2019 11.2* 12.0 - 15.9 g/dL Final   • Hematocrit 02/22/2019 37.7  34.0 - 46.6 % Final   • MCV 02/22/2019 88.1  79.0 - 97.0 fL Final   • MCH 02/22/2019 26.2* 26.6 - 33.0 pg Final   • MCHC 02/22/2019 29.7* 31.5 - 35.7 g/dL Final   • RDW 02/22/2019 16.9* 12.3 - 15.4 % Final   • Platelets 02/22/2019 259  140 - 450 10*3/mm3 Final   • Neutrophil Rel % 02/22/2019 73.0  42.7 - 76.0 % Final   • Lymphocyte Rel % 02/22/2019 18.1* 19.6 - 45.3 % Final   • Monocyte Rel % " 02/22/2019 6.3  5.0 - 12.0 % Final   • Eosinophil Rel % 02/22/2019 1.8  0.3 - 6.2 % Final   • Basophil Rel % 02/22/2019 0.2  0.0 - 1.5 % Final   • Neutrophils Absolute 02/22/2019 6.23  1.40 - 7.00 10*3/mm3 Final   • Lymphocytes Absolute 02/22/2019 1.54  0.70 - 3.10 10*3/mm3 Final   • Monocytes Absolute 02/22/2019 0.54  0.10 - 0.90 10*3/mm3 Final   • Eosinophils Absolute 02/22/2019 0.15  0.00 - 0.40 10*3/mm3 Final   • Basophils Absolute 02/22/2019 0.02  0.00 - 0.20 10*3/mm3 Final   • Immature Granulocyte Rel % 02/22/2019 0.6* 0.0 - 0.5 % Final   • Immature Grans Absolute 02/22/2019 0.05  0.00 - 0.05 10*3/mm3 Final   • nRBC 02/22/2019 0.0  0.0 - 0.0 /100 WBC Final   • Glucose 02/22/2019 83  65 - 99 mg/dL Final   • BUN 02/22/2019 16  6 - 20 mg/dL Final   • Creatinine 02/22/2019 0.66  0.57 - 1.00 mg/dL Final   • eGFR Non African Am 02/22/2019 104  >60 mL/min/1.73 Final   • eGFR African Am 02/22/2019 126  >60 mL/min/1.73 Final   • BUN/Creatinine Ratio 02/22/2019 24.2  7.0 - 25.0 Final   • Sodium 02/22/2019 139  136 - 145 mmol/L Final   • Potassium 02/22/2019 4.4  3.5 - 5.2 mmol/L Final   • Chloride 02/22/2019 102  98 - 107 mmol/L Final   • Total CO2 02/22/2019 24.1  22.0 - 29.0 mmol/L Final   • Calcium 02/22/2019 9.2  8.6 - 10.5 mg/dL Final   • Total Protein 02/22/2019 6.7  6.0 - 8.5 g/dL Final   • Albumin 02/22/2019 4.10  3.50 - 5.20 g/dL Final   • Globulin 02/22/2019 2.6  gm/dL Final   • A/G Ratio 02/22/2019 1.6  g/dL Final   • Total Bilirubin 02/22/2019 0.3  0.1 - 1.2 mg/dL Final   • Alkaline Phosphatase 02/22/2019 66  39 - 117 U/L Final   • AST (SGOT) 02/22/2019 15  1 - 32 U/L Final   • ALT (SGPT) 02/22/2019 14  1 - 33 U/L Final   • Total Cholesterol 02/22/2019 145  0 - 200 mg/dL Final   • Triglycerides 02/22/2019 44  0 - 150 mg/dL Final   • HDL Cholesterol 02/22/2019 56  40 - 60 mg/dL Final   • VLDL Cholesterol 02/22/2019 8.8  5 - 40 mg/dL Final   • LDL Cholesterol  02/22/2019 80  0 - 100 mg/dL Final   • Chol/HDL  Ratio 02/22/2019 2.59   Final   • Free T4 02/22/2019 1.07  0.93 - 1.70 ng/dL Final   • TSH 02/22/2019 1.960  0.270 - 4.200 mIU/mL Final   • Specific Gravity, UA 02/22/2019 1.025  1.005 - 1.030 Final   • pH, UA 02/22/2019 6.0  5.0 - 7.5 Final   • Color, UA 02/22/2019 Yellow  Yellow Final   • Appearance, UA 02/22/2019 Clear  Clear Final   • Leukocytes, UA 02/22/2019 Negative  Negative Final   • Protein 02/22/2019 Negative  Negative/Trace Final   • Glucose, UA 02/22/2019 Negative  Negative Final   • Ketones 02/22/2019 Negative  Negative Final   • Blood, UA 02/22/2019 1+* Negative Final   • Bilirubin, UA 02/22/2019 Negative  Negative Final   • Urobilinogen, UA 02/22/2019 0.2  0.2 - 1.0 mg/dL Final   • Nitrite, UA 02/22/2019 Negative  Negative Final   • Microscopic Examination 02/22/2019 See below:   Final    Microscopic was indicated and was performed.   • Urinalysis Reflex 02/22/2019 Comment   Final    This specimen will not reflex to a Urine Culture.   • WBC, UA 02/22/2019 0-5  0 - 5 /hpf Final   • RBC, UA 02/22/2019 0-2  0 - 2 /hpf Final   • Epithelial Cells (non renal) 02/22/2019 0-10  0 - 10 /hpf Final   • Casts 02/22/2019 Present* None seen /lpf Final   • Cast Type 02/22/2019 Hyaline casts  N/A Final   • Mucus, UA 02/22/2019 Present  Not Estab. /hpf Final   • Bacteria, UA 02/22/2019 None seen  None seen/Few /hpf Final         Current Outpatient Medications:   •  Ibuprofen (ADVIL PO), Take  by mouth As Needed., Disp: , Rfl:   •  pantoprazole (PROTONIX) 40 MG EC tablet, Take 1 tablet by mouth Daily., Disp: 90 tablet, Rfl: 3  •  sertraline (Zoloft) 25 MG tablet, Take 1 tablet by mouth Daily., Disp: 90 tablet, Rfl: 3    Diagnoses and all orders for this visit:    1. Preventative health care (Primary)  -     CBC & Differential  -     Comprehensive Metabolic Panel  -     Lipid Panel With / Chol / HDL Ratio  -     TSH  -     T4, Free  -     Hepatitis C Antibody  -     UA / M With / Rflx Culture([facility] ONLY) - Urine,  Clean Catch  -     Hemoglobin A1c  -     Fluarix Quad >6 Months (8513-6984)    2. Need for hepatitis C screening test  -     Hepatitis C Antibody    3. Needs flu shot  -     Fluarix Quad >6 Months ([id number])    Other orders  -     pantoprazole (PROTONIX) 40 MG EC tablet; Take 1 tablet by mouth Daily.  Dispense: 90 tablet; Refill: 3  -     sertraline (Zoloft) 25 MG tablet; Take 1 tablet by mouth Daily.  Dispense: 90 tablet; Refill: 3        Encourage healthy diet and exercise.  Encourage patient to stay up to date on screening examinations as indicated based on age and risk factors.    EMR Dragon/Transcription disclaimer:    Much of this encounter note is an electronic transcription/translation of spoken language to printed text.  The electronic translation of spoken language may permit erroneous, or at times, nonsensical words or phrases to be inadvertently transcribed.  Although I have reviewed the note for such errors some may still exist.

## 2021-02-05 LAB
ALBUMIN SERPL-MCNC: 4.2 G/DL (ref 3.5–5.2)
ALBUMIN/GLOB SERPL: 1.6 G/DL
ALP SERPL-CCNC: 74 U/L (ref 39–117)
ALT SERPL-CCNC: 11 U/L (ref 1–33)
APPEARANCE UR: CLEAR
AST SERPL-CCNC: 14 U/L (ref 1–32)
BACTERIA #/AREA URNS HPF: ABNORMAL /HPF
BASOPHILS # BLD AUTO: 0.04 10*3/MM3 (ref 0–0.2)
BASOPHILS NFR BLD AUTO: 0.5 % (ref 0–1.5)
BILIRUB SERPL-MCNC: 0.6 MG/DL (ref 0–1.2)
BILIRUB UR QL STRIP: NEGATIVE
BUN SERPL-MCNC: 13 MG/DL (ref 6–20)
BUN/CREAT SERPL: 17.6 (ref 7–25)
CALCIUM SERPL-MCNC: 9.7 MG/DL (ref 8.6–10.5)
CASTS URNS MICRO: ABNORMAL
CASTS URNS QL MICRO: PRESENT /LPF
CHLORIDE SERPL-SCNC: 103 MMOL/L (ref 98–107)
CHOLEST SERPL-MCNC: 168 MG/DL (ref 0–200)
CHOLEST/HDLC SERPL: 2.71 {RATIO}
CO2 SERPL-SCNC: 27.2 MMOL/L (ref 22–29)
COLOR UR: YELLOW
CREAT SERPL-MCNC: 0.74 MG/DL (ref 0.57–1)
CRYSTALS URNS MICRO: ABNORMAL
EOSINOPHIL # BLD AUTO: 0.21 10*3/MM3 (ref 0–0.4)
EOSINOPHIL NFR BLD AUTO: 2.9 % (ref 0.3–6.2)
EPI CELLS #/AREA URNS HPF: ABNORMAL /HPF (ref 0–10)
ERYTHROCYTE [DISTWIDTH] IN BLOOD BY AUTOMATED COUNT: 13 % (ref 12.3–15.4)
GLOBULIN SER CALC-MCNC: 2.7 GM/DL
GLUCOSE SERPL-MCNC: 86 MG/DL (ref 65–99)
GLUCOSE UR QL: NEGATIVE
HBA1C MFR BLD: 5.2 % (ref 4.8–5.6)
HCT VFR BLD AUTO: 39.7 % (ref 34–46.6)
HCV AB S/CO SERPL IA: <0.1 S/CO RATIO (ref 0–0.9)
HDLC SERPL-MCNC: 62 MG/DL (ref 40–60)
HGB BLD-MCNC: 13.1 G/DL (ref 12–15.9)
HGB UR QL STRIP: ABNORMAL
IMM GRANULOCYTES # BLD AUTO: 0.02 10*3/MM3 (ref 0–0.05)
IMM GRANULOCYTES NFR BLD AUTO: 0.3 % (ref 0–0.5)
KETONES UR QL STRIP: NEGATIVE
LDLC SERPL CALC-MCNC: 95 MG/DL (ref 0–100)
LEUKOCYTE ESTERASE UR QL STRIP: NEGATIVE
LYMPHOCYTES # BLD AUTO: 1.47 10*3/MM3 (ref 0.7–3.1)
LYMPHOCYTES NFR BLD AUTO: 20.1 % (ref 19.6–45.3)
MCH RBC QN AUTO: 29 PG (ref 26.6–33)
MCHC RBC AUTO-ENTMCNC: 33 G/DL (ref 31.5–35.7)
MCV RBC AUTO: 88 FL (ref 79–97)
MICRO URNS: ABNORMAL
MONOCYTES # BLD AUTO: 0.42 10*3/MM3 (ref 0.1–0.9)
MONOCYTES NFR BLD AUTO: 5.8 % (ref 5–12)
MUCOUS THREADS URNS QL MICRO: PRESENT /HPF
NEUTROPHILS # BLD AUTO: 5.14 10*3/MM3 (ref 1.7–7)
NEUTROPHILS NFR BLD AUTO: 70.4 % (ref 42.7–76)
NITRITE UR QL STRIP: NEGATIVE
NRBC BLD AUTO-RTO: 0 /100 WBC (ref 0–0.2)
PH UR STRIP: 6 [PH] (ref 5–7.5)
PLATELET # BLD AUTO: 279 10*3/MM3 (ref 140–450)
POTASSIUM SERPL-SCNC: 4.8 MMOL/L (ref 3.5–5.2)
PROT SERPL-MCNC: 6.9 G/DL (ref 6–8.5)
PROT UR QL STRIP: NEGATIVE
RBC # BLD AUTO: 4.51 10*6/MM3 (ref 3.77–5.28)
RBC #/AREA URNS HPF: ABNORMAL /HPF (ref 0–2)
SODIUM SERPL-SCNC: 139 MMOL/L (ref 136–145)
SP GR UR: 1.02 (ref 1–1.03)
T4 FREE SERPL-MCNC: 1.13 NG/DL (ref 0.93–1.7)
TRIGL SERPL-MCNC: 54 MG/DL (ref 0–150)
TSH SERPL DL<=0.005 MIU/L-ACNC: 1.42 UIU/ML (ref 0.27–4.2)
UNIDENT CRYS URNS QL MICRO: PRESENT /LPF
URINALYSIS REFLEX: ABNORMAL
UROBILINOGEN UR STRIP-MCNC: 0.2 MG/DL (ref 0.2–1)
VLDLC SERPL CALC-MCNC: 11 MG/DL (ref 5–40)
WBC # BLD AUTO: 7.3 10*3/MM3 (ref 3.4–10.8)
WBC #/AREA URNS HPF: ABNORMAL /HPF (ref 0–5)

## 2021-02-05 NOTE — PROGRESS NOTES
Called patient; left message on voicemail regarding blood work results (labs look great) per Dr. Bro.

## 2021-03-12 ENCOUNTER — APPOINTMENT (OUTPATIENT)
Dept: CARDIOLOGY | Facility: HOSPITAL | Age: 36
End: 2021-03-12

## 2021-03-12 ENCOUNTER — OFFICE VISIT (OUTPATIENT)
Dept: SPORTS MEDICINE | Facility: CLINIC | Age: 36
End: 2021-03-12

## 2021-03-12 ENCOUNTER — HOSPITAL ENCOUNTER (EMERGENCY)
Facility: HOSPITAL | Age: 36
Discharge: HOME | End: 2021-03-12
Attending: EMERGENCY MEDICINE | Admitting: EMERGENCY MEDICINE

## 2021-03-12 VITALS
DIASTOLIC BLOOD PRESSURE: 73 MMHG | WEIGHT: 222 LBS | BODY MASS INDEX: 35.68 KG/M2 | TEMPERATURE: 98.1 F | SYSTOLIC BLOOD PRESSURE: 106 MMHG | HEART RATE: 94 BPM | RESPIRATION RATE: 15 BRPM | HEIGHT: 66 IN | OXYGEN SATURATION: 99 %

## 2021-03-12 VITALS
TEMPERATURE: 97.2 F | OXYGEN SATURATION: 100 % | SYSTOLIC BLOOD PRESSURE: 111 MMHG | DIASTOLIC BLOOD PRESSURE: 77 MMHG | WEIGHT: 222.66 LBS | HEIGHT: 66 IN | RESPIRATION RATE: 15 BRPM | BODY MASS INDEX: 35.79 KG/M2 | HEART RATE: 82 BPM

## 2021-03-12 DIAGNOSIS — M79.2 NEURALGIA: ICD-10-CM

## 2021-03-12 DIAGNOSIS — M79.604 RIGHT LEG PAIN: Primary | ICD-10-CM

## 2021-03-12 LAB
BH CV LOWER VASCULAR LEFT COMMON FEMORAL AUGMENT: NORMAL
BH CV LOWER VASCULAR LEFT COMMON FEMORAL COMPETENT: NORMAL
BH CV LOWER VASCULAR LEFT COMMON FEMORAL COMPRESS: NORMAL
BH CV LOWER VASCULAR LEFT COMMON FEMORAL PHASIC: NORMAL
BH CV LOWER VASCULAR LEFT COMMON FEMORAL SPONT: NORMAL
BH CV LOWER VASCULAR RIGHT COMMON FEMORAL AUGMENT: NORMAL
BH CV LOWER VASCULAR RIGHT COMMON FEMORAL COMPETENT: NORMAL
BH CV LOWER VASCULAR RIGHT COMMON FEMORAL COMPRESS: NORMAL
BH CV LOWER VASCULAR RIGHT COMMON FEMORAL PHASIC: NORMAL
BH CV LOWER VASCULAR RIGHT COMMON FEMORAL SPONT: NORMAL
BH CV LOWER VASCULAR RIGHT DISTAL FEMORAL COMPRESS: NORMAL
BH CV LOWER VASCULAR RIGHT GASTRONEMIUS COMPRESS: NORMAL
BH CV LOWER VASCULAR RIGHT GREATER SAPH AK COMPRESS: NORMAL
BH CV LOWER VASCULAR RIGHT GREATER SAPH BK COMPRESS: NORMAL
BH CV LOWER VASCULAR RIGHT LESSER SAPH COMPRESS: NORMAL
BH CV LOWER VASCULAR RIGHT MID FEMORAL AUGMENT: NORMAL
BH CV LOWER VASCULAR RIGHT MID FEMORAL COMPETENT: NORMAL
BH CV LOWER VASCULAR RIGHT MID FEMORAL COMPRESS: NORMAL
BH CV LOWER VASCULAR RIGHT MID FEMORAL PHASIC: NORMAL
BH CV LOWER VASCULAR RIGHT MID FEMORAL SPONT: NORMAL
BH CV LOWER VASCULAR RIGHT PERONEAL COMPRESS: NORMAL
BH CV LOWER VASCULAR RIGHT POPLITEAL AUGMENT: NORMAL
BH CV LOWER VASCULAR RIGHT POPLITEAL COMPETENT: NORMAL
BH CV LOWER VASCULAR RIGHT POPLITEAL COMPRESS: NORMAL
BH CV LOWER VASCULAR RIGHT POPLITEAL PHASIC: NORMAL
BH CV LOWER VASCULAR RIGHT POPLITEAL SPONT: NORMAL
BH CV LOWER VASCULAR RIGHT POSTERIOR TIBIAL COMPRESS: NORMAL
BH CV LOWER VASCULAR RIGHT PROXIMAL FEMORAL COMPRESS: NORMAL
BH CV LOWER VASCULAR RIGHT SAPHENOFEMORAL JUNCTION COMPRESS: NORMAL

## 2021-03-12 PROCEDURE — 99282 EMERGENCY DEPT VISIT SF MDM: CPT

## 2021-03-12 PROCEDURE — 93971 EXTREMITY STUDY: CPT

## 2021-03-12 PROCEDURE — 99214 OFFICE O/P EST MOD 30 MIN: CPT | Performed by: FAMILY MEDICINE

## 2021-03-12 RX ORDER — PREDNISONE 10 MG/1
TABLET ORAL
Qty: 30 TABLET | Refills: 0 | Status: SHIPPED | OUTPATIENT
Start: 2021-03-12 | End: 2021-06-25

## 2021-03-12 NOTE — ED PROVIDER NOTES
Subjective   Chief complaint: Right leg pain    35-year-old female presents with right leg pain.  Patient states the pain has been present for about 1 week.  Pain is in the lateral aspect of the calf.  She states there is some mild swelling to the right leg but that is normal for her.  She has had previous injuries and she states the leg stays slightly swollen.  She denies any new injury.  She denies any chest pain or shortness of breath.  She does have a history of factor V Leiden mutation.  She is concerned she could have a blood clot.      History provided by:  Patient      Review of Systems   Constitutional: Negative for fever.   HENT: Negative for congestion.    Respiratory: Negative for cough and shortness of breath.    Cardiovascular: Negative for chest pain.   Gastrointestinal: Negative for abdominal pain.   Musculoskeletal:        Right leg pain   Skin: Negative for rash.   Neurological: Negative for headaches.       Past Medical History:   Diagnosis Date   • Anxiety    • Arthritis    • Depression    • Factor 5 Leiden mutation, heterozygous (CMS/HCC)    • GERD (gastroesophageal reflux disease)        No Known Allergies    Past Surgical History:   Procedure Laterality Date   • LEG SURGERY  01/2016    plate removed   • WISDOM TOOTH EXTRACTION  2009       Family History   Problem Relation Age of Onset   • Breast cancer Mother    • Hypertension Mother    • Hypertension Father    • Factor V Leiden deficiency Father    • Factor V Leiden deficiency Sister    • Factor V Leiden deficiency Paternal Aunt    • Breast cancer Maternal Grandmother    • Diabetes Maternal Grandfather    • Alzheimer's disease Paternal Grandmother        Social History     Socioeconomic History   • Marital status: Single     Spouse name: Not on file   • Number of children: Not on file   • Years of education: Not on file   • Highest education level: Not on file   Tobacco Use   • Smoking status: Never Smoker   • Smokeless tobacco: Never Used  "  Vaping Use   • Vaping Use: Never used   Substance and Sexual Activity   • Alcohol use: No   • Drug use: Defer   • Sexual activity: Defer       /77 (BP Location: Left arm, Patient Position: Sitting) Comment: Simultaneous filing. User may be unaware of other data. Comment (BP Location): Simultaneous filing. User may be unaware of other data. Comment (Patient Position): Simultaneous filing. User may be unaware of other data.  Pulse 82 Comment: Simultaneous filing. User may be unaware of other data.  Temp 97.2 °F (36.2 °C) (Oral) Comment: Simultaneous filing. User may be unaware of other data. Comment (Src): Simultaneous filing. User may be unaware of other data.  Resp 15 Comment: Simultaneous filing. User may be unaware of other data.  Ht 167.6 cm (66\") Comment: Simultaneous filing. User may be unaware of other data.  Wt 101 kg (222 lb 10.6 oz) Comment: Simultaneous filing. User may be unaware of other data.  LMP 02/26/2021 (Approximate)   SpO2 100% Comment: Simultaneous filing. User may be unaware of other data.  BMI 35.94 kg/m²       Objective   Physical Exam  Vitals and nursing note reviewed.   Constitutional:       Appearance: Normal appearance. She is well-developed.   HENT:      Head: Normocephalic and atraumatic.   Eyes:      Extraocular Movements: Extraocular movements intact.      Pupils: Pupils are equal, round, and reactive to light.   Cardiovascular:      Rate and Rhythm: Normal rate and regular rhythm.      Heart sounds: Normal heart sounds.   Pulmonary:      Effort: Pulmonary effort is normal. No respiratory distress.      Breath sounds: Normal breath sounds.   Abdominal:      General: Bowel sounds are normal.      Palpations: Abdomen is soft.      Tenderness: There is no abdominal tenderness.   Musculoskeletal:         General: Normal range of motion.      Cervical back: Normal range of motion and neck supple.      Comments: Mild tenderness to the lateral aspect of the right lower leg.  " There is no visible injury or deformity.  There is minimal swelling to the right lower leg.  Neurovascular intact distally.   Skin:     General: Skin is warm and dry.   Neurological:      General: No focal deficit present.      Mental Status: She is alert and oriented to person, place, and time.         Procedures           ED Course                                           MDM   Right lower extremity vascular ultrasound showed no DVT.  Patient is well-appearing on exam.  She is stable for discharge with primary care follow-up.      Final diagnoses:   Right leg pain            Pavel Menchaca MD  03/12/21 0925

## 2021-03-12 NOTE — PROGRESS NOTES
"Chief Complaint  Leg Pain (right leg - lateral lower leg radiating up the leg - soreness - 1 wk)    Subjective          Sena Min presents to [facility]  History of Present Illness  About 8 days ago patient was doing quite a lot of standing at work, that night she began having an intense deep pain about the size of a quarter over her right lateral mid calf.  Pain was worse when she would lie down.  The discomfort does seem to come and go and not exacerbated by any particular activities other than lying supine.  She had also noted that when she had this pain this began having pain up the leg posteriorly as well.  No back pain.  No shortness of breath.  No fever chills.  Patient has a history of previous trauma to the right ankle and right knee so she always carries some chronic swelling in the right lower extremity but she has not noted any increase in this since she began having pain 8 days ago.      Objective   Vital Signs:   /73 (BP Location: Left arm, Patient Position: Sitting, Cuff Size: Adult)   Pulse 94   Temp 98.1 °F (36.7 °C) (Oral)   Resp 15   Ht 167.6 cm (66\")   Wt 101 kg (222 lb)   SpO2 99%   BMI 35.83 kg/m²     Physical Exam  Vitals reviewed.   Constitutional:       Appearance: She is well-developed.   HENT:      Head: Normocephalic and atraumatic.   Eyes:      Conjunctiva/sclera: Conjunctivae normal.      Pupils: Pupils are equal, round, and reactive to light.   Cardiovascular:      Comments: Trace edema bilateral right greater than left but patient states this is normal for her.  Pulmonary:      Effort: Pulmonary effort is normal.   Musculoskeletal:      Comments: There is no tenderness to direct palpation of the lateral right mid calf.  Negative Mel.  No palpable cords.  Negative straight leg raise.  With compression just under the fibular head it does somewhat reproduce some of her discomfort.  Motor 5 out of 5.   Skin:     General: Skin " is warm and dry.   Neurological:      Mental Status: She is alert and oriented to person, place, and time.   Psychiatric:         Behavior: Behavior normal.        Result Review :                Right Tib-Fib X-Ray  Indication: Pain  AP and Lateral views    Findings:  Previous surgery for tib/fib fx with IM nail  Some soft tissue densities seen distal lateral, possilbe from compression sock    No prior studies were available for comparison.    Assessment and Plan    Diagnoses and all orders for this visit:    1. Right leg pain (Primary)  -     XR Tibia Fibula 2 View Right  -     predniSONE (DELTASONE) 10 MG tablet; 4 tabs qd x 3 d, then 3 tabs qd x 3 d, then 2 tabs qd x 3 d, then 1 tab qd  x 3 d  Dispense: 30 tablet; Refill: 0    2. Neuralgia  -     predniSONE (DELTASONE) 10 MG tablet; 4 tabs qd x 3 d, then 3 tabs qd x 3 d, then 2 tabs qd x 3 d, then 1 tab qd  x 3 d  Dispense: 30 tablet; Refill: 0    FU 3-4 days if not improving or sooner if needed.    Follow Up   No follow-ups on file.  Patient was given instructions and counseling regarding her condition or for health maintenance advice. Please see specific information pulled into the AVS if appropriate.

## 2021-03-12 NOTE — ED NOTES
"Pt reports having R leg pain X 1 week. Pt reports it got worse last night and this morning her leg pain started hurting in \"one specific spot\" on L anterior calf. Pt reports she called her PCP and he told her to come here, Pt reports she has Factor 5 Leiden.     Susana Islas RN  03/12/21 0908    "

## 2021-03-15 ENCOUNTER — TRANSCRIBE ORDERS (OUTPATIENT)
Dept: ADMINISTRATIVE | Facility: HOSPITAL | Age: 36
End: 2021-03-15

## 2021-03-15 DIAGNOSIS — Z12.31 SCREENING MAMMOGRAM FOR HIGH-RISK PATIENT: Primary | ICD-10-CM

## 2021-03-22 ENCOUNTER — HOSPITAL ENCOUNTER (OUTPATIENT)
Dept: MAMMOGRAPHY | Facility: HOSPITAL | Age: 36
Discharge: HOME | End: 2021-03-22
Admitting: FAMILY MEDICINE

## 2021-03-22 DIAGNOSIS — Z12.31 SCREENING MAMMOGRAM FOR HIGH-RISK PATIENT: ICD-10-CM

## 2021-03-22 PROCEDURE — 77063 BREAST TOMOSYNTHESIS BI: CPT

## 2021-03-22 PROCEDURE — 77067 SCR MAMMO BI INCL CAD: CPT

## 2021-03-24 NOTE — PROGRESS NOTES
Called and spoke to patient, verbalized normal results and to repeat in 1 year.   Information was verbally understood.  Thanks  Eva

## 2021-06-25 ENCOUNTER — OFFICE VISIT (OUTPATIENT)
Dept: SPORTS MEDICINE | Facility: CLINIC | Age: 36
End: 2021-06-25

## 2021-06-25 VITALS
HEART RATE: 65 BPM | HEIGHT: 66 IN | WEIGHT: 233 LBS | DIASTOLIC BLOOD PRESSURE: 72 MMHG | TEMPERATURE: 98 F | BODY MASS INDEX: 37.45 KG/M2 | OXYGEN SATURATION: 100 % | SYSTOLIC BLOOD PRESSURE: 120 MMHG

## 2021-06-25 DIAGNOSIS — M25.551 RIGHT HIP PAIN: Primary | ICD-10-CM

## 2021-06-25 DIAGNOSIS — M67.351 TRANSIENT SYNOVITIS OF RIGHT HIP: ICD-10-CM

## 2021-06-25 PROCEDURE — 73502 X-RAY EXAM HIP UNI 2-3 VIEWS: CPT | Performed by: FAMILY MEDICINE

## 2021-06-25 PROCEDURE — 99213 OFFICE O/P EST LOW 20 MIN: CPT | Performed by: FAMILY MEDICINE

## 2021-06-25 RX ORDER — DICLOFENAC POTASSIUM 50 MG/1
50 TABLET, FILM COATED ORAL 3 TIMES DAILY
Qty: 40 TABLET | Refills: 0 | Status: SHIPPED | OUTPATIENT
Start: 2021-06-25 | End: 2021-09-07 | Stop reason: SDUPTHER

## 2021-06-25 NOTE — PROGRESS NOTES
"Answers for HPI/ROS submitted by the patient on 6/22/2021  What is the primary reason for your visit?: Lower Extremity Injury  Incident occurred: more than 1 week ago  Incident location: other  Injury mechanism: other  Pain location: right hip  Pain quality: aching  Pain - numeric: 4/10  Pain course: fluctuating  tingling: No  inability to bear weight: No  loss of motion: No  loss of sensation: No  muscle weakness: No  Foreign body present: no foreign bodies  Aggravated by: nothing      Actually no known trauma. Past month but worse this past week. Better today. Worse at night. Has had episode of \"catching\". No giving way. No popping. Pain is over the anterior hip.  No systemic symptoms.    Chief Complaint  Hip Pain (RT side pain; states that it has been about a month, comes and goes. sometimes laying down causes pain) and Knee Pain (RT SIDE; chronic issue that has now progressed)    Subjective          Sena Min presents to [facility]  History of Present Illness    Objective   Vital Signs:   /72 (BP Location: Left arm, Patient Position: Sitting, Cuff Size: Large Adult)   Pulse 65   Temp 98 °F (36.7 °C) (Temporal)   Ht 167.6 cm (65.98\")   Wt 106 kg (233 lb)   SpO2 100%   BMI 37.63 kg/m²     Physical Exam  Vitals reviewed.   Constitutional:       Appearance: She is well-developed.   HENT:      Head: Normocephalic and atraumatic.   Eyes:      Conjunctiva/sclera: Conjunctivae normal.      Pupils: Pupils are equal, round, and reactive to light.   Cardiovascular:      Comments: No peripheral edema  Pulmonary:      Effort: Pulmonary effort is normal.   Musculoskeletal:      Comments: Right hip normal general appearance.  Patient has a very mildly positive logroll.  Patient has full painless range of motion in external and internal rotation.  Negative FADIR.  No pain with resisted hip flexion.   Skin:     General: Skin is warm and dry.   Neurological:      Mental " Status: She is alert and oriented to person, place, and time.   Psychiatric:         Behavior: Behavior normal.        Result Review :                Right Hip X-Ray  Indication: Pain  AP and Frog Leg views    Findings:  No fracture  No bony lesion  Normal soft tissues  Normal joint spaces    No prior studies were available for comparison.    Assessment and Plan    Diagnoses and all orders for this visit:    1. Right hip pain (Primary)  -     XR Hip With or Without Pelvis 2 - 3 View Right  -     diclofenac (CATAFLAM) 50 MG tablet; Take 1 tablet by mouth 3 (Three) Times a Day.  Dispense: 40 tablet; Refill: 0    2. Transient synovitis of right hip  -     diclofenac (CATAFLAM) 50 MG tablet; Take 1 tablet by mouth 3 (Three) Times a Day.  Dispense: 40 tablet; Refill: 0      Possible transient synovitis, will treat with anti-inflammatories but if no significant improvement over the next 2 weeks then consider MRI right hip.      Follow Up   No follow-ups on file.  Patient was given instructions and counseling regarding her condition or for health maintenance advice. Please see specific information pulled into the AVS if appropriate.

## 2021-09-07 DIAGNOSIS — M67.351 TRANSIENT SYNOVITIS OF RIGHT HIP: ICD-10-CM

## 2021-09-07 DIAGNOSIS — M25.551 RIGHT HIP PAIN: ICD-10-CM

## 2021-09-07 RX ORDER — DICLOFENAC POTASSIUM 50 MG/1
50 TABLET, FILM COATED ORAL 3 TIMES DAILY
Qty: 40 TABLET | Refills: 0 | Status: SHIPPED | OUTPATIENT
Start: 2021-09-07 | End: 2021-12-14

## 2021-12-08 DIAGNOSIS — M25.551 RIGHT HIP PAIN: ICD-10-CM

## 2021-12-08 DIAGNOSIS — M67.351 TRANSIENT SYNOVITIS OF RIGHT HIP: ICD-10-CM

## 2021-12-14 RX ORDER — DICLOFENAC POTASSIUM 50 MG/1
TABLET, FILM COATED ORAL
Qty: 40 TABLET | Refills: 0 | Status: SHIPPED | OUTPATIENT
Start: 2021-12-14 | End: 2022-02-18 | Stop reason: ALTCHOICE

## 2022-02-14 RX ORDER — SERTRALINE HYDROCHLORIDE 25 MG/1
25 TABLET, FILM COATED ORAL DAILY
Qty: 90 TABLET | Refills: 3 | Status: SHIPPED | OUTPATIENT
Start: 2022-02-14 | End: 2023-02-13

## 2022-02-14 RX ORDER — PANTOPRAZOLE SODIUM 40 MG/1
40 TABLET, DELAYED RELEASE ORAL DAILY
Qty: 90 TABLET | Refills: 3 | Status: SHIPPED | OUTPATIENT
Start: 2022-02-14 | End: 2023-02-13

## 2022-02-18 ENCOUNTER — OFFICE VISIT (OUTPATIENT)
Dept: SPORTS MEDICINE | Facility: CLINIC | Age: 37
End: 2022-02-18

## 2022-02-18 VITALS
TEMPERATURE: 98 F | BODY MASS INDEX: 37.93 KG/M2 | HEART RATE: 65 BPM | WEIGHT: 236 LBS | OXYGEN SATURATION: 98 % | HEIGHT: 66 IN | SYSTOLIC BLOOD PRESSURE: 110 MMHG | DIASTOLIC BLOOD PRESSURE: 72 MMHG

## 2022-02-18 DIAGNOSIS — M23.92 INTERNAL DERANGEMENT OF LEFT KNEE: ICD-10-CM

## 2022-02-18 DIAGNOSIS — M25.562 CHRONIC PAIN OF LEFT KNEE: Chronic | ICD-10-CM

## 2022-02-18 DIAGNOSIS — G89.29 CHRONIC PAIN OF LEFT KNEE: Chronic | ICD-10-CM

## 2022-02-18 PROCEDURE — 73562 X-RAY EXAM OF KNEE 3: CPT | Performed by: FAMILY MEDICINE

## 2022-02-18 PROCEDURE — 99214 OFFICE O/P EST MOD 30 MIN: CPT | Performed by: FAMILY MEDICINE

## 2022-02-18 NOTE — PROGRESS NOTES
"Chief Complaint  Knee Pain (states that it has been aching and swollen; has heard a popping noise and seems to be more of an issue when going up steps)    Subjective          Sena Min presents to [facility]  History of Present Illness  Over the past several months patient has been noting some intermittent left anterior knee pain.  Couple weeks ago she was visiting a friend's home, took several trips up and down stairs and next day had fairly significant left anterior knee pain with some swelling.  Also has noted some \"weakness\" in the knee.  No known trauma.  No erythema of the joint.  No locking.  Has heard more popping with flexion extension of the knee.  Objective   Vital Signs:   /72 (BP Location: Left arm, Patient Position: Sitting, Cuff Size: Adult)   Pulse 65   Temp 98 °F (36.7 °C) (Temporal)   Ht 167.6 cm (65.98\")   Wt 107 kg (236 lb)   SpO2 98%   BMI 38.11 kg/m²     Physical Exam  Vitals reviewed.   Constitutional:       Appearance: She is well-developed.   HENT:      Head: Normocephalic and atraumatic.   Eyes:      Conjunctiva/sclera: Conjunctivae normal.      Pupils: Pupils are equal, round, and reactive to light.   Cardiovascular:      Comments: No peripheral edema  Pulmonary:      Effort: Pulmonary effort is normal.   Musculoskeletal:      Comments: Left knee with mild effusion, patient has patellofemoral crepitus and positive Abner sign.  Negative Lachman, negative anterior posterior drawer, equivocal medial Reyna.   Skin:     General: Skin is warm and dry.   Neurological:      Mental Status: She is alert and oriented to person, place, and time.   Psychiatric:         Behavior: Behavior normal.        Result Review :                Left Knee X-Ray  Indication: Pain    Views: AP, Lateral, and [place]    Findings:  No fracture  No bony lesion  OA changes PF and lateral compartment    No prior studies were available for " comparison.    Assessment and Plan    Diagnoses and all orders for this visit:    1. Chronic pain of left knee  -     MRI Knee Left Without Contrast; Future  -     XR Knee 3 View Left  -     diclofenac sodium (VOTAREN XR) 100 MG 24 hr tablet; Take 1 tablet by mouth Daily. With food  Dispense: 30 tablet; Refill: 5    2. Internal derangement of left knee  -     MRI Knee Left Without Contrast; Future  -     diclofenac sodium (VOTAREN XR) 100 MG 24 hr tablet; Take 1 tablet by mouth Daily. With food  Dispense: 30 tablet; Refill: 5        I believe the most of her symptoms represent patellofemoral origin however she does have some symptoms and positives on exam that could be consistent with a medial meniscal tear.  We will set up MRI left knee.  Follow-up after MRI.    Follow Up   No follow-ups on file.  Patient was given instructions and counseling regarding her condition or for health maintenance advice. Please see specific information pulled into the AVS if appropriate.

## 2022-02-22 ENCOUNTER — TRANSCRIBE ORDERS (OUTPATIENT)
Dept: ADMINISTRATIVE | Facility: HOSPITAL | Age: 37
End: 2022-02-22

## 2022-02-22 DIAGNOSIS — Z12.31 SCREENING MAMMOGRAM FOR BREAST CANCER: Primary | ICD-10-CM

## 2022-03-02 DIAGNOSIS — M23.92 INTERNAL DERANGEMENT OF LEFT KNEE: ICD-10-CM

## 2022-03-02 DIAGNOSIS — M25.562 CHRONIC PAIN OF LEFT KNEE: Chronic | ICD-10-CM

## 2022-03-02 DIAGNOSIS — G89.29 CHRONIC PAIN OF LEFT KNEE: Chronic | ICD-10-CM

## 2022-03-08 DIAGNOSIS — M22.42 PATELLOFEMORAL CHONDROSIS OF LEFT KNEE: Primary | ICD-10-CM

## 2022-03-24 ENCOUNTER — HOSPITAL ENCOUNTER (OUTPATIENT)
Dept: MAMMOGRAPHY | Facility: HOSPITAL | Age: 37
Discharge: HOME | End: 2022-03-24
Admitting: FAMILY MEDICINE

## 2022-03-24 DIAGNOSIS — Z12.31 SCREENING MAMMOGRAM FOR BREAST CANCER: ICD-10-CM

## 2022-03-24 PROCEDURE — 77067 SCR MAMMO BI INCL CAD: CPT

## 2022-03-24 PROCEDURE — 77063 BREAST TOMOSYNTHESIS BI: CPT

## 2022-05-11 ENCOUNTER — LAB (OUTPATIENT)
Dept: LAB | Facility: HOSPITAL | Age: 37
End: 2022-05-11

## 2022-05-11 ENCOUNTER — OFFICE VISIT (OUTPATIENT)
Dept: SPORTS MEDICINE | Facility: CLINIC | Age: 37
End: 2022-05-11

## 2022-05-11 VITALS
SYSTOLIC BLOOD PRESSURE: 118 MMHG | DIASTOLIC BLOOD PRESSURE: 60 MMHG | WEIGHT: 243 LBS | HEIGHT: 66 IN | BODY MASS INDEX: 39.05 KG/M2 | TEMPERATURE: 97.5 F | HEART RATE: 59 BPM | OXYGEN SATURATION: 99 %

## 2022-05-11 DIAGNOSIS — Z00.00 PREVENTATIVE HEALTH CARE: Primary | ICD-10-CM

## 2022-05-11 LAB
ALBUMIN SERPL-MCNC: 4.2 G/DL (ref 3.5–5.2)
ALBUMIN/GLOB SERPL: 1.4 G/DL
ALP SERPL-CCNC: 81 U/L (ref 39–117)
ALT SERPL W P-5'-P-CCNC: 15 U/L (ref 1–33)
ANION GAP SERPL CALCULATED.3IONS-SCNC: 12 MMOL/L (ref 5–15)
AST SERPL-CCNC: 13 U/L (ref 1–32)
BASOPHILS # BLD AUTO: 0.04 10*3/MM3 (ref 0–0.2)
BASOPHILS NFR BLD AUTO: 0.3 % (ref 0–1.5)
BILIRUB SERPL-MCNC: 0.4 MG/DL (ref 0–1.2)
BUN SERPL-MCNC: 12 MG/DL (ref 6–20)
BUN/CREAT SERPL: 15.8 (ref 7–25)
CALCIUM SPEC-SCNC: 9.6 MG/DL (ref 8.6–10.5)
CHLORIDE SERPL-SCNC: 101 MMOL/L (ref 98–107)
CHOLEST SERPL-MCNC: 161 MG/DL (ref 0–200)
CO2 SERPL-SCNC: 24 MMOL/L (ref 22–29)
CREAT SERPL-MCNC: 0.76 MG/DL (ref 0.57–1)
DEPRECATED RDW RBC AUTO: 43.4 FL (ref 37–54)
EGFRCR SERPLBLD CKD-EPI 2021: 104.9 ML/MIN/1.73
EOSINOPHIL # BLD AUTO: 0.12 10*3/MM3 (ref 0–0.4)
EOSINOPHIL NFR BLD AUTO: 1 % (ref 0.3–6.2)
ERYTHROCYTE [DISTWIDTH] IN BLOOD BY AUTOMATED COUNT: 13.5 % (ref 12.3–15.4)
GLOBULIN UR ELPH-MCNC: 3.1 GM/DL
GLUCOSE SERPL-MCNC: 81 MG/DL (ref 65–99)
HBA1C MFR BLD: 5 % (ref 4.8–5.6)
HCT VFR BLD AUTO: 38.4 % (ref 34–46.6)
HDLC SERPL QL: 2.6
HDLC SERPL-MCNC: 62 MG/DL (ref 40–60)
HGB BLD-MCNC: 12.3 G/DL (ref 12–15.9)
IMM GRANULOCYTES # BLD AUTO: 0.04 10*3/MM3 (ref 0–0.05)
IMM GRANULOCYTES NFR BLD AUTO: 0.3 % (ref 0–0.5)
LDLC SERPL CALC-MCNC: 89 MG/DL (ref 0–100)
LYMPHOCYTES # BLD AUTO: 2.59 10*3/MM3 (ref 0.7–3.1)
LYMPHOCYTES NFR BLD AUTO: 22.6 % (ref 19.6–45.3)
MCH RBC QN AUTO: 28 PG (ref 26.6–33)
MCHC RBC AUTO-ENTMCNC: 32 G/DL (ref 31.5–35.7)
MCV RBC AUTO: 87.3 FL (ref 79–97)
MONOCYTES # BLD AUTO: 0.52 10*3/MM3 (ref 0.1–0.9)
MONOCYTES NFR BLD AUTO: 4.5 % (ref 5–12)
NEUTROPHILS NFR BLD AUTO: 71.3 % (ref 42.7–76)
NEUTROPHILS NFR BLD AUTO: 8.14 10*3/MM3 (ref 1.7–7)
NRBC BLD AUTO-RTO: 0 /100 WBC (ref 0–0.2)
PLATELET # BLD AUTO: 341 10*3/MM3 (ref 140–450)
PMV BLD AUTO: 9.7 FL (ref 6–12)
POTASSIUM SERPL-SCNC: 4.2 MMOL/L (ref 3.5–5.2)
PROT SERPL-MCNC: 7.3 G/DL (ref 6–8.5)
RBC # BLD AUTO: 4.4 10*6/MM3 (ref 3.77–5.28)
SODIUM SERPL-SCNC: 137 MMOL/L (ref 136–145)
T4 FREE SERPL-MCNC: 1.21 NG/DL (ref 0.93–1.7)
TRIGL SERPL-MCNC: 46 MG/DL (ref 0–150)
TSH SERPL DL<=0.05 MIU/L-ACNC: 1.34 UIU/ML (ref 0.27–4.2)
VLDLC SERPL-MCNC: 10 MG/DL (ref 5–40)
WBC NRBC COR # BLD: 11.45 10*3/MM3 (ref 3.4–10.8)

## 2022-05-11 PROCEDURE — 80050 GENERAL HEALTH PANEL: CPT | Performed by: FAMILY MEDICINE

## 2022-05-11 PROCEDURE — 84439 ASSAY OF FREE THYROXINE: CPT | Performed by: FAMILY MEDICINE

## 2022-05-11 PROCEDURE — 83036 HEMOGLOBIN GLYCOSYLATED A1C: CPT | Performed by: FAMILY MEDICINE

## 2022-05-11 PROCEDURE — 81001 URINALYSIS AUTO W/SCOPE: CPT | Performed by: FAMILY MEDICINE

## 2022-05-11 PROCEDURE — 80061 LIPID PANEL: CPT | Performed by: FAMILY MEDICINE

## 2022-05-11 PROCEDURE — 36415 COLL VENOUS BLD VENIPUNCTURE: CPT | Performed by: FAMILY MEDICINE

## 2022-05-11 PROCEDURE — 87086 URINE CULTURE/COLONY COUNT: CPT | Performed by: FAMILY MEDICINE

## 2022-05-11 PROCEDURE — 99395 PREV VISIT EST AGE 18-39: CPT | Performed by: FAMILY MEDICINE

## 2022-05-11 NOTE — PROGRESS NOTES
"Sena Min is here today for an annual physical exam.     .     PHQ-2 Depression Screening  Little interest or pleasure in doing things?  0   Feeling down, depressed, or hopeless?  0   PHQ-2 Total Score  0         I have reviewed the patient's medical, family, and social history in detail and updated the computerized patient record.    Screening history:  Colonoscopy - n/a  Breast cancer, Pap/pelvic - per GYN  Metabolic - last year    Health Maintenance   Topic Date Due   • Annual Gynecologic Pelvic and Breast Exam  Never done   • PAP SMEAR  Never done   • ANNUAL PHYSICAL  02/05/2022   • INFLUENZA VACCINE  08/01/2022   • TDAP/TD VACCINES (2 - Td or Tdap) 05/04/2027   • HEPATITIS C SCREENING  Completed   • COVID-19 Vaccine  Completed   • Pneumococcal Vaccine 0-64  Aged Out       Review of Systems    /60 (BP Location: Left arm, Patient Position: Sitting, Cuff Size: Large Adult)   Pulse 59   Temp 97.5 °F (36.4 °C) (Temporal)   Ht 167.6 cm (65.98\")   Wt 110 kg (243 lb)   SpO2 99%   BMI 39.24 kg/m²      Physical Exam    Vital signs reviewed.  General appearance: No acute distress  Eyes: conjunctiva clear without erythema; pupils equally round and reactive  ENT: external ears normal; hearing normal  Neck: supple; no thyromegaly  CV: normal rate and rhythm; no peripheral edema  Respiratory: normal respiratory effort; lungs clear to auscultation bilaterally  MSK: normal gait and station; no focal joint deformity or swelling  Skin: no rash or wounds; normal turgor  Neuro: cranial nerves 2-12 grossly intact; normal sensation to light touch  Psych: mood and affect normal; recent and remote memory intact    No visits with results within 2 Week(s) from this visit.   Latest known visit with results is:   Admission on 03/12/2021, Discharged on 03/12/2021   Component Date Value Ref Range Status   • Right Common Femoral Spont 03/12/2021 Y   Final   • Right Common Femoral Phasic 03/12/2021 Y   Final   • Right " Common Femoral Augment 03/12/2021 Y   Final   • Right Common Femoral Competent 03/12/2021 Y   Final   • Right Common Femoral Compress 03/12/2021 C   Final   • Right Saphenofemoral Junction Comp* 03/12/2021 C   Final   • Right Proximal Femoral Compress 03/12/2021 C   Final   • Right Mid Femoral Spont 03/12/2021 Y   Final   • Right Mid Femoral Phasic 03/12/2021 Y   Final   • Right Mid Femoral Augment 03/12/2021 Y   Final   • Right Mid Femoral Competent 03/12/2021 Y   Final   • Right Mid Femoral Compress 03/12/2021 C   Final   • Right Distal Femoral Compress 03/12/2021 C   Final   • Right Popliteal Spont 03/12/2021 Y   Final   • Right Popliteal Phasic 03/12/2021 Y   Final   • Right Popliteal Augment 03/12/2021 Y   Final   • Right Popliteal Competent 03/12/2021 Y   Final   • Right Popliteal Compress 03/12/2021 C   Final   • Right Posterior Tibial Compress 03/12/2021 C   Final   • Right Peroneal Compress 03/12/2021 C   Final   • Right Gastronemius Compress 03/12/2021 C   Final   • Right Greater Saph AK Compress 03/12/2021 C   Final   • Right Greater Saph BK Compress 03/12/2021 C   Final   • Right Lesser Saph Compress 03/12/2021 C   Final   • Left Common Femoral Spont 03/12/2021 Y   Final   • Left Common Femoral Phasic 03/12/2021 Y   Final   • Left Common Femoral Augment 03/12/2021 Y   Final   • Left Common Femoral Competent 03/12/2021 Y   Final   • Left Common Femoral Compress 03/12/2021 C   Final         Current Outpatient Medications:   •  pantoprazole (PROTONIX) 40 MG EC tablet, TAKE 1 TABLET BY MOUTH DAILY, Disp: 90 tablet, Rfl: 3  •  sertraline (ZOLOFT) 25 MG tablet, TAKE 1 TABLET BY MOUTH DAILY, Disp: 90 tablet, Rfl: 3    Diagnoses and all orders for this visit:    1. Preventative health care (Primary)  -     Hemoglobin A1c  -     CBC & Differential  -     Comprehensive Metabolic Panel  -     Lipid Panel With / Chol / HDL Ratio  -     TSH  -     T4, Free  -     UA / M With / Rflx Culture([facility] ONLY) - Urine,  Clean Catch        Encourage healthy diet and exercise.  Encourage patient to stay up to date on screening examinations as indicated based on age and risk factors.    EMR Dragon/Transcription disclaimer:    Much of this encounter note is an electronic transcription/translation of spoken language to printed text.  The electronic translation of spoken language may permit erroneous, or at times, nonsensical words or phrases to be inadvertently transcribed.  Although I have reviewed the note for such errors some may still exist.

## 2022-05-12 LAB
BACTERIA UR QL AUTO: ABNORMAL /HPF
BILIRUB UR QL STRIP: NEGATIVE
CLARITY UR: ABNORMAL
COLOR UR: YELLOW
GLUCOSE UR STRIP-MCNC: NEGATIVE MG/DL
HGB UR QL STRIP.AUTO: ABNORMAL
HYALINE CASTS UR QL AUTO: ABNORMAL /LPF
KETONES UR QL STRIP: NEGATIVE
LEUKOCYTE ESTERASE UR QL STRIP.AUTO: ABNORMAL
NITRITE UR QL STRIP: NEGATIVE
PH UR STRIP.AUTO: 5.5 [PH] (ref 5–8)
PROT UR QL STRIP: NEGATIVE
RBC # UR STRIP: ABNORMAL /HPF
REF LAB TEST METHOD: ABNORMAL
SP GR UR STRIP: 1.01 (ref 1–1.03)
SQUAMOUS #/AREA URNS HPF: ABNORMAL /HPF
UROBILINOGEN UR QL STRIP: ABNORMAL
WBC # UR STRIP: ABNORMAL /HPF

## 2022-05-13 LAB — BACTERIA SPEC AEROBE CULT: NORMAL

## 2022-05-13 NOTE — PROGRESS NOTES
I called the patient and relayed the lab results above per the physician and patient verbalized understanding. Patient did not have any further requests or questions at this time. No further action needed. Thank you!    -patient states that she does not have any UTI s/s

## 2022-07-07 NOTE — PROGRESS NOTES
" Physical Therapy Daily Progress Note    Subjective     Sena Min reports: her knee is feeling a lot better \"I didn't realize how bad it hurt until it started getting so much better\"      Objective   See Exercise, Manual, and Modality Logs for complete treatment.       Assessment/Plan  Hip stability continues to improve.  Right knee edema has resolved at this time.  Will benefit from continued PT to further progress strength.  Progress strengthening /stabilization /functional activity           Manual Therapy:    0     mins  00125;  Therapeutic Exercise:    45     mins  66555;     Neuromuscular Ramana:    0    mins  78027;    Therapeutic Activity:     0     mins  26230;     Gait Trainin     mins  18392;     Ultrasound:     0     mins  58832;    Electrical Stimulation:    15     mins  39691 ( );    Timed Treatment:   45   mins   Total Treatment:     60   mins    Iveth Gutierrez PT, DPT  Physical Therapist  [place] License #[id number]                    " MRA Ordered. Schedule follow up after MRA done.

## 2022-11-17 ENCOUNTER — TELEPHONE (OUTPATIENT)
Dept: SPORTS MEDICINE | Facility: CLINIC | Age: 37
End: 2022-11-17

## 2022-11-17 NOTE — TELEPHONE ENCOUNTER
IMAGING DISC - LT KNEE DONE 03/01/2022 AT [facility]     DISC RETURNED TO PATIENT VIA MAIL     THANKS  YUAN

## 2023-01-31 ENCOUNTER — TREATMENT (OUTPATIENT)
Dept: PHYSICAL THERAPY | Facility: CLINIC | Age: 38
End: 2023-01-31
Payer: COMMERCIAL

## 2023-01-31 ENCOUNTER — TRANSCRIBE ORDERS (OUTPATIENT)
Dept: PHYSICAL THERAPY | Facility: CLINIC | Age: 38
End: 2023-01-31
Payer: COMMERCIAL

## 2023-01-31 DIAGNOSIS — M25.561 CHRONIC PAIN OF BOTH KNEES: ICD-10-CM

## 2023-01-31 DIAGNOSIS — G89.29 CHRONIC PAIN OF BOTH KNEES: ICD-10-CM

## 2023-01-31 DIAGNOSIS — M22.2X1 PATELLOFEMORAL DISORDER OF BOTH KNEES: Primary | ICD-10-CM

## 2023-01-31 DIAGNOSIS — M25.562 CHRONIC PAIN OF BOTH KNEES: ICD-10-CM

## 2023-01-31 DIAGNOSIS — M22.2X2 PATELLOFEMORAL DISORDER OF BOTH KNEES: Primary | ICD-10-CM

## 2023-01-31 PROCEDURE — 97161 PT EVAL LOW COMPLEX 20 MIN: CPT

## 2023-02-13 RX ORDER — PANTOPRAZOLE SODIUM 40 MG/1
40 TABLET, DELAYED RELEASE ORAL DAILY
Qty: 30 TABLET | Refills: 0 | Status: SHIPPED | OUTPATIENT
Start: 2023-02-13 | End: 2023-03-13 | Stop reason: SDUPTHER

## 2023-02-13 RX ORDER — PANTOPRAZOLE SODIUM 40 MG/1
40 TABLET, DELAYED RELEASE ORAL DAILY
Qty: 90 TABLET | Refills: 3 | OUTPATIENT
Start: 2023-02-13

## 2023-02-13 RX ORDER — SERTRALINE HYDROCHLORIDE 25 MG/1
25 TABLET, FILM COATED ORAL DAILY
Qty: 30 TABLET | Refills: 0 | Status: SHIPPED | OUTPATIENT
Start: 2023-02-13 | End: 2023-04-07

## 2023-02-16 ENCOUNTER — TREATMENT (OUTPATIENT)
Dept: PHYSICAL THERAPY | Facility: CLINIC | Age: 38
End: 2023-02-16
Payer: COMMERCIAL

## 2023-02-16 DIAGNOSIS — G89.29 CHRONIC PAIN OF BOTH KNEES: ICD-10-CM

## 2023-02-16 DIAGNOSIS — M22.2X1 PATELLOFEMORAL DISORDER OF BOTH KNEES: Primary | ICD-10-CM

## 2023-02-16 DIAGNOSIS — M22.2X2 PATELLOFEMORAL DISORDER OF BOTH KNEES: Primary | ICD-10-CM

## 2023-02-16 DIAGNOSIS — M25.562 CHRONIC PAIN OF BOTH KNEES: ICD-10-CM

## 2023-02-16 DIAGNOSIS — M25.561 CHRONIC PAIN OF BOTH KNEES: ICD-10-CM

## 2023-02-16 PROCEDURE — 97110 THERAPEUTIC EXERCISES: CPT

## 2023-02-16 NOTE — PROGRESS NOTES
Physical Therapy Treatment  Note  [address] [place]   [address]     Diagnosis Plan   1. Patellofemoral disorder of both knees        2. Chronic pain of both knees            VISIT#: 2    Subjective   Sena Min reports: she has an appt for additional consult with ortho.  Patient reported she has experienced episodes of weakness at quad musculature.  Patient reported this has not occurred until the last week or soon.        Objective     See Exercise, Manual, and Modality Logs for complete treatment.     Patient Education:  Goals  Plan Goals: Plan Goals: STG's: 4 weeks   1. Patient will report pain level at worse </= 4/10 for improved tolerance to ADLs and functional mobility/ambulation > 30 minutes   2. Patient will require <3 tactile or verbal cues for proper mechanics during completion of her HEP for self management of impairments.   3. Pt will demonstrate improved hip flexion and extension strength = 5/5 for improved support promoting improved gait mechanics and functional mobility.    LTG's: By Discharge (8 weeks)   1. Patient will report pain levels at worst </= 2/10 for improved tolerance to ADLs and functional mobility  2. Patient will be able to ambulate unlimited distances without an assistive device and no increased pain  3. Patient will be able to complete single leg stance on stable surface with no UE support, with supervision for durations > 30  seconds on RLE   4. Patient will report improved levels of overall function as demonstrated by an increase in her reported level of function score on the LEFS to >/= 70/80    5. Patient will require no tactile or verbal cues for proper mechaics during completion of her HEP for final independence       Assessment/Plan   - Patient denied elevated pain level from arrival from therapy.  Patient reported muscular fatigue but denied change in pain.         Progress per Plan of Care            Timed:         Manual Therapy:    5     mins   29317;     Therapeutic Exercise:    40     mins  68196;     Neuromuscular Ramana:    0    mins  98159;    Therapeutic Activity:     0     mins  57156;     Gait Trainin     mins  08830;     Ultrasound:     0     mins  18342;    Ionto                               0    mins   95717  Self Care                       0     mins   05652  Canalith Repos               0    mins  4209  Aquatic                          0     mins 57115    Un-Timed:  Electrical Stimulation:    0     mins  39637 ( );  Dry Needling     0     mins self-pay  Traction     0     mins 35841  Low Eval     0     Mins  89568  Mod Eval     0     Mins  65897  High Eval                       0     Mins  35894  Re-Eval                           0    mins  83017    Timed Treatment:   45   mins   Total Treatment:     45   mins    Taya Gonsalez, PT PT, DPT, [id number]

## 2023-02-17 ENCOUNTER — TELEPHONE (OUTPATIENT)
Dept: SPORTS MEDICINE | Facility: CLINIC | Age: 38
End: 2023-02-17
Payer: COMMERCIAL

## 2023-02-22 ENCOUNTER — TREATMENT (OUTPATIENT)
Dept: PHYSICAL THERAPY | Facility: CLINIC | Age: 38
End: 2023-02-22
Payer: COMMERCIAL

## 2023-02-22 DIAGNOSIS — M25.561 CHRONIC PAIN OF BOTH KNEES: ICD-10-CM

## 2023-02-22 DIAGNOSIS — M22.2X2 PATELLOFEMORAL DISORDER OF BOTH KNEES: Primary | ICD-10-CM

## 2023-02-22 DIAGNOSIS — M25.562 CHRONIC PAIN OF BOTH KNEES: ICD-10-CM

## 2023-02-22 DIAGNOSIS — M22.2X1 PATELLOFEMORAL DISORDER OF BOTH KNEES: Primary | ICD-10-CM

## 2023-02-22 DIAGNOSIS — G89.29 CHRONIC PAIN OF BOTH KNEES: ICD-10-CM

## 2023-02-22 PROCEDURE — 97110 THERAPEUTIC EXERCISES: CPT

## 2023-02-22 NOTE — PROGRESS NOTES
Physical Therapy Treatment  Note  [address] [place]   [address]     Diagnosis Plan   1. Patellofemoral disorder of both knees        2. Chronic pain of both knees            VISIT#: 3    Subjective   Sena Min reports: she has an appt for additional consult with ortho.  Patient reported she has experienced episodes of weakness at quad musculature.  Patient reported this has not occurred until the last week or soon.        Objective     See Exercise, Manual, and Modality Logs for complete treatment.     Patient Education:  Goals  Plan Goals: Plan Goals: STG's: 4 weeks   1. Patient will report pain level at worse </= 4/10 for improved tolerance to ADLs and functional mobility/ambulation > 30 minutes   2. Patient will require <3 tactile or verbal cues for proper mechanics during completion of her HEP for self management of impairments.   3. Pt will demonstrate improved hip flexion and extension strength = 5/5 for improved support promoting improved gait mechanics and functional mobility.    LTG's: By Discharge (8 weeks)   1. Patient will report pain levels at worst </= 2/10 for improved tolerance to ADLs and functional mobility  2. Patient will be able to ambulate unlimited distances without an assistive device and no increased pain  3. Patient will be able to complete single leg stance on stable surface with no UE support, with supervision for durations > 30  seconds on RLE   4. Patient will report improved levels of overall function as demonstrated by an increase in her reported level of function score on the LEFS to >/= 70/80    5. Patient will require no tactile or verbal cues for proper mechaics during completion of her HEP for final independence       Assessment/Plan   -  Patient reported recent consult with 7th ortho specialist last week.  Per patient, gel injections recommended.    Cortisone has not been effective in the past.  - Has consult in [place] next week with a patella  malalignment/maltracking specialist to address ongoing pain and impaired function which has not improved with conservative measures to date.  - Added prone hip extensor strengthening and monster walks to promote strengthening this date.  - Pt reported muscular fatigue at conclusion of treatment      Progress per Plan of Care            Timed:         Manual Therapy:    0  mins  12020;     Therapeutic Exercise:    40     mins  54728;     Neuromuscular Ramana:    0    mins  57277;    Therapeutic Activity:     0     mins  99211;     Gait Trainin     mins  88194;     Ultrasound:     0     mins  07087;    Ionto                               0    mins   55424  Self Care                       0     mins   25751  Canalith Repos               0    mins  4209  Aquatic                          0     mins 92748    Un-Timed:  Electrical Stimulation:    0     mins  97689 ( );  Dry Needling     0     mins self-pay  Traction     0     mins 30827  Low Eval     0     Mins  15218  Mod Eval     0     Mins  51600  High Eval                       0     Mins  65210  Re-Eval                           0    mins  80072    Timed Treatment:   40   mins   Total Treatment:     40   mins    Taya Gonsalez, PT PT, DPT, [id number]

## 2023-03-08 ENCOUNTER — TREATMENT (OUTPATIENT)
Dept: PHYSICAL THERAPY | Facility: CLINIC | Age: 38
End: 2023-03-08
Payer: COMMERCIAL

## 2023-03-08 DIAGNOSIS — M25.561 CHRONIC PAIN OF BOTH KNEES: ICD-10-CM

## 2023-03-08 DIAGNOSIS — G89.29 CHRONIC PAIN OF BOTH KNEES: ICD-10-CM

## 2023-03-08 DIAGNOSIS — M22.2X2 PATELLOFEMORAL DISORDER OF BOTH KNEES: Primary | ICD-10-CM

## 2023-03-08 DIAGNOSIS — M25.562 CHRONIC PAIN OF BOTH KNEES: ICD-10-CM

## 2023-03-08 DIAGNOSIS — M22.2X1 PATELLOFEMORAL DISORDER OF BOTH KNEES: Primary | ICD-10-CM

## 2023-03-08 PROCEDURE — 97530 THERAPEUTIC ACTIVITIES: CPT

## 2023-03-08 PROCEDURE — 97110 THERAPEUTIC EXERCISES: CPT

## 2023-03-08 NOTE — PROGRESS NOTES
Physical Therapy  Progress Note/Reasessment  [address] [place], [place] [address], Phone: [phone]    Patient: Sena Min   : 1985  Diagnosis/ICD-10 Code:  Patellofemoral disorder of both knees [M22.2X1, M22.2X2]  Referring practitioner: Lanre Meehan MD  Date of Initial Visit: Type: THERAPY  Noted: 2023  Today's Date: 3/8/2023  Patient seen for 4 sessions      Subjective:   Visit Diagnosis:    ICD-10-CM ICD-9-CM   1. Patellofemoral disorder of both knees  M22.2X1 719.96    M22.2X2    2. Chronic pain of both knees  M25.561 719.46    M25.562 338.29    G89.29        Sena Reyesmealf reports: Patient rated current pain level as 2/10.  Patient reported (R) knee generally more painful than (L); however (L) knee feels weaker.  Subjective Questionnaire: LEFS: not reassessed (at time of initial evaluation : LEFS: 43/80 indicates 46.25% impairment)  Clinical Progress: unchanged  Home Program Compliance: Yes  Treatment has included: therapeutic exercise    Subjective   Current pain ratin  At best pain ratin  At worst pain ratin (8 at time of initial evaluation )   Location: (B) knees - (R) > (L)     Objective     Strength/Myotome Testing      Left Hip   Planes of Motion   Flexion: 4+  Extension: 4+  Abduction: 5  ER: 4/5  IR: 4-/5     Right Hip   Planes of Motion   Flexion:5  Extension: 5  Abduction: 5  ER:4+/5  IR: 4/5  Assessment/Plan  - Pt demonstrates progress toward goals and has met 1/3 STGs.    - Improved strength noted upon MMT this date  - 2 grade improvement in highest pain level since initiating evaluation  - Pt is currently being worked up for RA.    - Recommend continued therapy to address remaining deficits to maximize function and improve quality of life  Progress toward previous goals: Partially Met      Goals  Plan Goals: Plan Goals: STG's: 4 weeks   1. Patient will report pain level at worse </= 4/10 for improved tolerance to ADLs and functional  mobility/ambulation > 30 minutes : PROGRESSING, improvement in highest level of pain   2. Patient will require <3 tactile or verbal cues for proper mechanics during completion of her HEP for self management of impairments. MET  3. Pt will demonstrate improved hip flexion and extension strength = 5/5 for improved support promoting improved gait mechanics and functional mobility.: PROGRESSING, see objective findings    LTG's: By Discharge (8 weeks)   1. Patient will report pain levels at worst </= 2/10 for improved tolerance to ADLs and functional mobility  2. Patient will be able to ambulate unlimited distances without an assistive device and no increased pain  3. Patient will be able to complete single leg stance on stable surface with no UE support, with supervision for durations > 30  seconds on RLE   4. Patient will report improved levels of overall function as demonstrated by an increase in her reported level of function score on the LEFS to >/= 70/80    5. Patient will require no tactile or verbal cues for proper mechaics during completion of her HEP for final independence   Short-term goals (STG): 1  /3  Long-term goals (LTG):    /5      Recommendations: Continue as planned  Timeframe: 1 month  Prognosis to achieve goals: fair    Timed:         Manual Therapy:    0     mins  23635;     Therapeutic Exercise:    45     mins  72215;     Neuromuscular Ramana:    0    mins  47530;    Therapeutic Activity:     8     mins  76808;     Gait Trainin     mins  36288;     Ultrasound:     0     mins  62360;    Ionto                               0    mins   19251  Self Care                       0     mins   47531  Aquatic                          0     mins 99018      Un-Timed:  Electrical Stimulation:    0     mins  74495 ( );  Dry Needling     0     mins self-pay  Traction     0     mins 04092  Low Eval     0     Mins  10660  Mod Eval     0     Mins  30214  High Eval                       0     Mins   25643  Re-Eval                           0    mins  99717      Timed Treatment:   53   mins   Total Treatment:     53   mins      PT Signature: Taya Gonsalez, PT  [place] License #: [id number]

## 2023-03-13 ENCOUNTER — OFFICE VISIT (OUTPATIENT)
Dept: FAMILY MEDICINE CLINIC | Facility: CLINIC | Age: 38
End: 2023-03-13
Payer: COMMERCIAL

## 2023-03-13 VITALS
BODY MASS INDEX: 40.98 KG/M2 | TEMPERATURE: 97.8 F | DIASTOLIC BLOOD PRESSURE: 79 MMHG | WEIGHT: 255 LBS | HEART RATE: 68 BPM | SYSTOLIC BLOOD PRESSURE: 129 MMHG | HEIGHT: 66 IN | OXYGEN SATURATION: 100 %

## 2023-03-13 DIAGNOSIS — G89.29 CHRONIC PAIN OF BOTH KNEES: Primary | ICD-10-CM

## 2023-03-13 DIAGNOSIS — Z00.00 ROUTINE GENERAL MEDICAL EXAMINATION AT A HEALTH CARE FACILITY: ICD-10-CM

## 2023-03-13 DIAGNOSIS — D68.51 FACTOR V LEIDEN: ICD-10-CM

## 2023-03-13 DIAGNOSIS — M25.562 CHRONIC PAIN OF BOTH KNEES: Primary | ICD-10-CM

## 2023-03-13 DIAGNOSIS — M25.561 CHRONIC PAIN OF BOTH KNEES: Primary | ICD-10-CM

## 2023-03-13 DIAGNOSIS — K21.9 GASTROESOPHAGEAL REFLUX DISEASE, UNSPECIFIED WHETHER ESOPHAGITIS PRESENT: ICD-10-CM

## 2023-03-13 PROBLEM — H25.043 POSTERIOR SUBCAPSULAR POLAR SENILE CATARACT OF BOTH EYES: Status: ACTIVE | Noted: 2022-06-06

## 2023-03-13 PROBLEM — H43.399 VITREOUS FLOATERS: Status: ACTIVE | Noted: 2022-06-06

## 2023-03-13 PROCEDURE — 99214 OFFICE O/P EST MOD 30 MIN: CPT | Performed by: NURSE PRACTITIONER

## 2023-03-13 RX ORDER — PANTOPRAZOLE SODIUM 40 MG/1
40 TABLET, DELAYED RELEASE ORAL DAILY
Qty: 90 TABLET | Refills: 0 | Status: SHIPPED | OUTPATIENT
Start: 2023-03-13

## 2023-03-13 RX ORDER — DICLOFENAC SODIUM 75 MG/1
TABLET, DELAYED RELEASE ORAL
COMMUNITY
End: 2023-03-13 | Stop reason: SDUPTHER

## 2023-03-13 RX ORDER — DICLOFENAC SODIUM 75 MG/1
75 TABLET, DELAYED RELEASE ORAL DAILY
Qty: 90 TABLET | Refills: 0 | Status: SHIPPED | OUTPATIENT
Start: 2023-03-13

## 2023-03-13 NOTE — PROGRESS NOTES
Chief Complaint  Establish Care (New pt, discuss labs to check for RA, discuss factor 5 to see if she should be taking something, )    Subjective        eSna Min presents to [facility]  History of Present Illness     Ms. SENA MIN is a 37-year-old female who presents as a new patient to establish care for arthritis and factor V Leiden. She was a patient of Dr. Bro.    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 She has gained significant weight in the past 1.5 years.    She lives with her mother. She has 4 nieces, 2 for her brother and 2 for her sister. She notes that she has given over 7 gallons of blood in her life.    She was tested for factor V Leiden when she was in her 20s and was positive.  Her father was first diagnosed with factor V Leiden when he had a superficial blood clot in his leg. She notes that her cousin had a pulmonary embolism. Her aunt has factor V Leiden. Her sister has factor V Leiden.  Patient has done the [employer] tests which confirmed factor V Leiden.  She has never been on any sort of treatment and wonders if she should be on preventative medication.  She is not sure if she was positive for other clotting disorder.  She has never had a DVT or PE.    She has had knee problems starting as a teen.  Started with her right knee. She notes that she had a meniscus tear repair. Her right knee still bothers her. Her left knee is also very painful.  She has been to couple of orthopedists. She kept getting cortisone shots even though they were not helping anything. She notes that she thinks they make it worse.     She looked up a malalignment specialist and went up to [place] to see a doctor who actually listened to her and wants her to get checked for rheumatoid arthritis. He told her to follow up with her primary care physician. If it is rheumatoid, she will start  medicine and then see if  still needs surgery. If it is still bothering her enough, then surgery. He would actually refer her to a different surgeon up there who has more experience with her knee problems. He told her that the damage to the cartilage on the back of the kneecap is bad enough that she will need at least partial knee replacement in the future. They wanted to continue the topical Voltaren and gel injections. Her insurance does not cover gel injections.  Physical therapy did help in the past. She has had some benefit with Accupuncture.    Her mother has had a hip replacement. She states that her brother has Crohn's disease.  No known rheumatoid in her family.    Pain mostly her knees.  Pain is located generally just behind bilateral patella.  She states that she occasionally feels like her right hip is hurting.  Reports she has a very high pain tolerance.    She has morning stiffness. That it kind of gets better or worse over the course of the day. She notes that stairs are particularly problematic. Going up or down is particularly painful.  Knees have never given out-but sometimes seem like they may.  Knees do get swollen, no known erythema.    She denies any fevers or chills, unusual fatigue.    She is a heavy sleeper. She sleeps 9 to 10 hours a night. She notes that she is struggling to stay awake. She has started to replace Coke with some sugar free caffeine mints. She was drinking 7 of those just to function throughout the day. She denies snoring. She has been screened for sleep apnea. She went for the follow-up results, she was told that if she had sleep apnea, they want her stay for the daytime part after. She was looking into doing another one again a couple of months ago, but it is cost prohibitive due to high deductible plan.    She takes Zoloft for anxiety and depression and has been weaning off of it slowly.  She is now at half a tablet per day without any noticeable worsening of her mood and plans to continue  "gradually weaning down and off.       She is taking diclofenac for her knees. That it does help. She notes that she takes 1 diclofenac a day.    She takes Protonix for heartburn.  If she does not take it, the next day she has terrible GERD. She notes that she has to take Tums like candy when she misses PPI. She denies ever having an EGD. Everyone in her family is on some kind of heartburn medicine. She denies blood in her stool.    She denies any shortness of breath or coughing. She denies any heart problems. She denies any chest pain or heart palpitations. Her mother has atrial fibrillation. She notes that multiple grandparents have had heart valve replacements.    She is going to follow up with her gynecologist. She has had some changes to her cycle lately. She notes that her gynecologist is Dr. Pineda in [place].    She denies  thyroid problems.     She is not sexually active. She does not use contraceptives.    She would like to have surgery to fix her knees, but would also like to know why her knees \"suck so much\" at such a young age. She has been to a lot of knee specialists, and has not had improvement so far with treatment plans.  She would like definitive treatment.  Her life is significantly impacted by her bilateral knee pain.  She is constantly in pain, cannot do a lot of normal ADLs or be active due to pain.  She wakes up at night frequently with knee pain.  She feels sometimes like her kneecap is sliding sideways.      Objective   Vital Signs:  /79   Pulse 68   Temp 97.8 °F (36.6 °C)   Ht 167.6 cm (66\")   Wt 116 kg (255 lb)   SpO2 100%   BMI 41.16 kg/m²   Estimated body mass index is 41.16 kg/m² as calculated from the following:    Height as of this encounter: 167.6 cm (66\").    Weight as of this encounter: 116 kg (255 lb).       Class 3 Severe Obesity (BMI >=40). Obesity-related health conditions include the following: osteoarthritis. Obesity is newly identified. BMI is is above " average; BMI management plan is completed. We discussed portion control and increasing exercise.    A review of systems was performed, and the pertinent positives are noted in the HPI.    Physical Exam  Vitals and nursing note reviewed.   Constitutional:       General: She is not in acute distress.     Appearance: She is well-developed. She is obese. She is not ill-appearing.   HENT:      Head: Normocephalic and atraumatic.      Right Ear: Tympanic membrane, ear canal and external ear normal.      Left Ear: Tympanic membrane, ear canal and external ear normal.      Mouth/Throat:      Mouth: Mucous membranes are moist.      Pharynx: Uvula midline. No posterior oropharyngeal erythema.   Eyes:      General: No scleral icterus.        Right eye: No discharge.         Left eye: No discharge.      Conjunctiva/sclera: Conjunctivae normal.   Neck:      Thyroid: No thyromegaly.   Cardiovascular:      Rate and Rhythm: Normal rate and regular rhythm.      Heart sounds: Normal heart sounds. No murmur heard.  Pulmonary:      Effort: Pulmonary effort is normal.      Breath sounds: Normal breath sounds.   Abdominal:      General: Bowel sounds are normal. There is no distension.      Palpations: Abdomen is soft.      Tenderness: There is no abdominal tenderness.   Musculoskeletal:      Cervical back: Neck supple.      Right knee: Deformity (Enlarged joint) and crepitus present. No bony tenderness. Normal range of motion. No tenderness. Normal patellar mobility.      Left knee: Deformity (Enlarged joint) and crepitus present. No bony tenderness. Normal range of motion. No tenderness. Normal patellar mobility.      Comments: Gait smooth and steady   Lymphadenopathy:      Cervical: No cervical adenopathy.   Skin:     General: Skin is warm and dry.   Neurological:      General: No focal deficit present.      Mental Status: She is alert and oriented to person, place, and time.        Result Review :                   Assessment and Plan    Diagnoses and all orders for this visit:    1. Chronic pain of both knees (Primary)  -     C-reactive Protein  -     Sedimentation Rate  -     Cyclic Citrul Peptide Antibody, IgG / IgA  -     Rheumatoid Factor  -     Comprehensive Metabolic Panel  -     diclofenac (VOLTAREN) 75 MG EC tablet; Take 1 tablet by mouth Daily.  Dispense: 90 tablet; Refill: 0    2. Gastroesophageal reflux disease, unspecified whether esophagitis present  -     pantoprazole (PROTONIX) 40 MG EC tablet; Take 1 tablet by mouth Daily.  Dispense: 90 tablet; Refill: 0    3. Factor V Leiden (HCC)  -     Ambulatory Referral to Hematology    4. Routine general medical examination at a health care facility  -     Lipid Panel With LDL / HDL Ratio  -     CBC & Differential      Will get labs for rheumatoid but this does not sound like rheumatoid arthritis.  Discussed that osteoarthritis is equally as painful and does not diminish her pain at all.  Just changes management strategies.  We will await labs prior to any changes other than I will refill her diclofenac.  Cautioned to always take with food.    I am concerned about her GERD which is only improved with daily PPI.  Especially since she is on a daily NSAID.  When she returns we will need to discuss possible EGD.    Referral to hematology for factor V which I think is heterozygous and likely does not need any treatment but I do not see any labs in her chart.    Reviewed MRI of knee and prior Ortho notes.  Also reviewed previous labs.  No work-up for factor V Leiden or inflammatory markers, RA done previously that I can see.           Follow Up   Return if symptoms worsen or fail to improve.  Patient was given instructions and counseling regarding her condition or for health maintenance advice. Please see specific information pulled into the AVS if appropriate.             Transcribed from ambient dictation for CLINTON Vega by Taya Li.  03/13/23   16:51 EDT    Patient or patient  representative verbalized consent to the visit recording.  I have personally performed the services described in this document as transcribed by the above individual, and it is both accurate and complete.

## 2023-03-14 DIAGNOSIS — Z12.31 SCREENING MAMMOGRAM FOR BREAST CANCER: Primary | ICD-10-CM

## 2023-03-14 LAB
ALBUMIN SERPL-MCNC: 4.4 G/DL (ref 3.8–4.8)
ALBUMIN/GLOB SERPL: 1.6 {RATIO} (ref 1.2–2.2)
ALP SERPL-CCNC: 89 IU/L (ref 44–121)
ALT SERPL-CCNC: 23 IU/L (ref 0–32)
AST SERPL-CCNC: 16 IU/L (ref 0–40)
BASOPHILS # BLD AUTO: 0 X10E3/UL (ref 0–0.2)
BASOPHILS NFR BLD AUTO: 0 %
BILIRUB SERPL-MCNC: 0.4 MG/DL (ref 0–1.2)
BUN SERPL-MCNC: 13 MG/DL (ref 6–20)
BUN/CREAT SERPL: 18 (ref 9–23)
CALCIUM SERPL-MCNC: 9.8 MG/DL (ref 8.7–10.2)
CCP IGA+IGG SERPL IA-ACNC: 3 UNITS (ref 0–19)
CHLORIDE SERPL-SCNC: 100 MMOL/L (ref 96–106)
CHOLEST SERPL-MCNC: 166 MG/DL (ref 100–199)
CO2 SERPL-SCNC: 23 MMOL/L (ref 20–29)
CREAT SERPL-MCNC: 0.73 MG/DL (ref 0.57–1)
CRP SERPL-MCNC: 29 MG/L (ref 0–10)
EGFRCR SERPLBLD CKD-EPI 2021: 109 ML/MIN/1.73
EOSINOPHIL # BLD AUTO: 0.1 X10E3/UL (ref 0–0.4)
EOSINOPHIL NFR BLD AUTO: 1 %
ERYTHROCYTE [DISTWIDTH] IN BLOOD BY AUTOMATED COUNT: 13.9 % (ref 11.7–15.4)
ERYTHROCYTE [SEDIMENTATION RATE] IN BLOOD BY WESTERGREN METHOD: 28 MM/HR (ref 0–32)
GLOBULIN SER CALC-MCNC: 2.8 G/DL (ref 1.5–4.5)
GLUCOSE SERPL-MCNC: 79 MG/DL (ref 70–99)
HCT VFR BLD AUTO: 37.2 % (ref 34–46.6)
HDLC SERPL-MCNC: 61 MG/DL
HGB BLD-MCNC: 12 G/DL (ref 11.1–15.9)
IMM GRANULOCYTES # BLD AUTO: 0 X10E3/UL (ref 0–0.1)
IMM GRANULOCYTES NFR BLD AUTO: 0 %
LDLC SERPL CALC-MCNC: 95 MG/DL (ref 0–99)
LDLC/HDLC SERPL: 1.6 RATIO (ref 0–3.2)
LYMPHOCYTES # BLD AUTO: 2.6 X10E3/UL (ref 0.7–3.1)
LYMPHOCYTES NFR BLD AUTO: 21 %
MCH RBC QN AUTO: 27.4 PG (ref 26.6–33)
MCHC RBC AUTO-ENTMCNC: 32.3 G/DL (ref 31.5–35.7)
MCV RBC AUTO: 85 FL (ref 79–97)
MONOCYTES # BLD AUTO: 0.6 X10E3/UL (ref 0.1–0.9)
MONOCYTES NFR BLD AUTO: 5 %
NEUTROPHILS # BLD AUTO: 9 X10E3/UL (ref 1.4–7)
NEUTROPHILS NFR BLD AUTO: 73 %
PLATELET # BLD AUTO: 373 X10E3/UL (ref 150–450)
POTASSIUM SERPL-SCNC: 4.7 MMOL/L (ref 3.5–5.2)
PROT SERPL-MCNC: 7.2 G/DL (ref 6–8.5)
RBC # BLD AUTO: 4.38 X10E6/UL (ref 3.77–5.28)
RHEUMATOID FACT SERPL-ACNC: 10.4 IU/ML
SODIUM SERPL-SCNC: 137 MMOL/L (ref 134–144)
TRIGL SERPL-MCNC: 50 MG/DL (ref 0–149)
VLDLC SERPL CALC-MCNC: 10 MG/DL (ref 5–40)
WBC # BLD AUTO: 12.4 X10E3/UL (ref 3.4–10.8)

## 2023-03-15 ENCOUNTER — TREATMENT (OUTPATIENT)
Dept: PHYSICAL THERAPY | Facility: CLINIC | Age: 38
End: 2023-03-15
Payer: COMMERCIAL

## 2023-03-15 DIAGNOSIS — M22.2X1 PATELLOFEMORAL DISORDER OF BOTH KNEES: Primary | ICD-10-CM

## 2023-03-15 DIAGNOSIS — M22.2X2 PATELLOFEMORAL DISORDER OF BOTH KNEES: Primary | ICD-10-CM

## 2023-03-15 DIAGNOSIS — M25.562 CHRONIC PAIN OF BOTH KNEES: ICD-10-CM

## 2023-03-15 DIAGNOSIS — G89.29 CHRONIC PAIN OF BOTH KNEES: ICD-10-CM

## 2023-03-15 DIAGNOSIS — M25.561 CHRONIC PAIN OF BOTH KNEES: ICD-10-CM

## 2023-03-15 PROCEDURE — 97110 THERAPEUTIC EXERCISES: CPT

## 2023-03-15 NOTE — PROGRESS NOTES
Physical Therapy Treatment  Note  [address] [place]   [address]     Diagnosis Plan   1. Patellofemoral disorder of both knees        2. Chronic pain of both knees            VISIT#: [id number]    Subjective   Sena Min reports:received test results back .  Stated she does not have RA.  Patient stated knee pain has been minimal today.       Objective     See Exercise, Manual, and Modality Logs for complete treatment.     Patient Education:  Goals  Plan Goals: Plan Goals: STG's: 4 weeks   1. Patient will report pain level at worse </= 4/10 for improved tolerance to ADLs and functional mobility/ambulation > 30 minutes   2. Patient will require <3 tactile or verbal cues for proper mechanics during completion of her HEP for self management of impairments.   3. Pt will demonstrate improved hip flexion and extension strength = 5/5 for improved support promoting improved gait mechanics and functional mobility.    LTG's: By Discharge (8 weeks)   1. Patient will report pain levels at worst </= 2/10 for improved tolerance to ADLs and functional mobility  2. Patient will be able to ambulate unlimited distances without an assistive device and no increased pain  3. Patient will be able to complete single leg stance on stable surface with no UE support, with supervision for durations > 30  seconds on RLE   4. Patient will report improved levels of overall function as demonstrated by an increase in her reported level of function score on the LEFS to >/= 70/80    5. Patient will require no tactile or verbal cues for proper mechaics during completion of her HEP for final independence       Assessment/Plan   - Patient reported pain is the lowest it has been for some time.  Patient tolerated addition of leg press and prone hip extensor strengthening well without complaint    Progress per Plan of Care            Timed:         Manual Therapy:    0  mins  21030;     Therapeutic Exercise:    40     mins  88417;      Neuromuscular Ramana:    0    mins  28400;    Therapeutic Activity:     0     mins  57452;     Gait Trainin     mins  22527;     Ultrasound:     0     mins  68397;    Ionto                               0    mins   58465  Self Care                       0     mins   04079  Canalith Repos               0    mins  4209  Aquatic                          0     mins 48673    Un-Timed:  Electrical Stimulation:    0     mins  05958 ( );  Dry Needling     0     mins self-pay  Traction     0     mins 02132  Low Eval     0     Mins  10684  Mod Eval     0     Mins  38466  High Eval                       0     Mins  19502  Re-Eval                           0    mins  53376    Timed Treatment:   40   mins   Total Treatment:     40   mins    Taya Gonsalez, PT PT, DPT, [id number]

## 2023-03-20 ENCOUNTER — TELEPHONE (OUTPATIENT)
Dept: FAMILY MEDICINE CLINIC | Facility: CLINIC | Age: 38
End: 2023-03-20
Payer: COMMERCIAL

## 2023-03-22 ENCOUNTER — TREATMENT (OUTPATIENT)
Dept: PHYSICAL THERAPY | Facility: CLINIC | Age: 38
End: 2023-03-22
Payer: COMMERCIAL

## 2023-03-22 ENCOUNTER — TRANSCRIBE ORDERS (OUTPATIENT)
Dept: ADMINISTRATIVE | Facility: HOSPITAL | Age: 38
End: 2023-03-22
Payer: COMMERCIAL

## 2023-03-22 DIAGNOSIS — M22.2X2 PATELLOFEMORAL DISORDER OF BOTH KNEES: Primary | ICD-10-CM

## 2023-03-22 DIAGNOSIS — M25.561 RIGHT KNEE PAIN, UNSPECIFIED CHRONICITY: Primary | ICD-10-CM

## 2023-03-22 DIAGNOSIS — M25.562 CHRONIC PAIN OF BOTH KNEES: ICD-10-CM

## 2023-03-22 DIAGNOSIS — M22.2X1 PATELLOFEMORAL DISORDER OF BOTH KNEES: Primary | ICD-10-CM

## 2023-03-22 DIAGNOSIS — G89.29 CHRONIC PAIN OF BOTH KNEES: ICD-10-CM

## 2023-03-22 DIAGNOSIS — M25.561 CHRONIC PAIN OF BOTH KNEES: ICD-10-CM

## 2023-03-22 PROCEDURE — 97110 THERAPEUTIC EXERCISES: CPT

## 2023-03-22 NOTE — PROGRESS NOTES
Physical Therapy Discharge  [address], [place] [address], Phone: [phone]    Patient: Sena Min   : 1985  Diagnosis/ICD-10 Code:  Patellofemoral disorder of both knees [M22.2X1, M22.2X2]  Referring practitioner: Lanre Meehan MD  Date of Initial Visit: Type: THERAPY  Noted: 2023  Today's Date: 3/22/2023  Patient seen for 6 sessions      Subjective:   Visit Diagnosis:    ICD-10-CM ICD-9-CM   1. Patellofemoral disorder of both knees  M22.2X1 719.96    M22.2X2    2. Chronic pain of both knees  M25.561 719.46    M25.562 338.29    G89.29        Sena Min reports: knees are more stiff today.  No necessarily painful   Subjective Questionnaire: LEFS: 57/80 indicates 28.75% functional impairment (at time of initial evaluation : LEFS: 43/80 indicates 46.25% impairment)  Clinical Progress: improved  Home Program Compliance: Yes  Treatment has included: therapeutic exercise    Subjective     Current pain ratin  At best pain ratin  At worst pain ratin (8 at time of initial evaluation )   Location: (B) knees - (R) > (L)   Objective   Left Hip   Planes of Motion   Flexion: 4+  Extension: 4+  Abduction: 5  ER: 4+/5  IR: 4/5     Right Hip   Planes of Motion   Flexion:5  Extension: 5  Abduction: 5  ER:5/5  IR: 5/5    Assessment/Plan   - Pt in agreement to D/C with updated HEP this date  - HEP handout issued and reviewed.  Pt was able to return demo.   - Pt demonstrates improved (B) hip strength versus MMT at time of initial evaluation. Pt also reported a reduction in overall pain since initiating therapy  - Pt reported she has an upcoming consult with a surgeon in [place] next month.  Progress toward previous goals: Partially Met    Goals  Plan Goals: Plan Goals: STG's: 4 weeks   1. Patient will report pain level at worse </= 4/10 for improved tolerance to ADLs and functional mobility/ambulation > 30 minutes : MET, improvement in highest level of pain   2.  Patient will require <3 tactile or verbal cues for proper mechanics during completion of her HEP for self management of impairments. MET  3. Pt will demonstrate improved hip flexion and extension strength = 5/5 for improved support promoting improved gait mechanics and functional mobility.: PROGRESSING, see objective findings    LTG's: By Discharge (8 weeks)   1. Patient will report pain levels at worst </= 2/10 for improved tolerance to ADLs and functional mobility: PROGRESSING, see subjective report above  2. Patient will be able to ambulate unlimited distances without an assistive device and no increased pain: INCONSISTENT, pt reported at times she is limited by pain and/or stiffness of knees.     3. Patient will be able to complete single leg stance on stable surface with no UE support, with supervision for durations > 30  seconds on RLE : PROGRESSING   4. Patient will report improved levels of overall function as demonstrated by an increase in her reported level of function score on the LEFS to >/= 70/80  : PROGRESSING    Short-term goals (STG): 2/3  Long-term goals (LTG):   0/4      Recommendations: Discharge      Timed:         Manual Therapy:    0     mins  42033;     Therapeutic Exercise:    50     mins  59854;     Neuromuscular Ramana:    0    mins  60915;    Therapeutic Activity:     0     mins  65528;     Gait Trainin     mins  72162;     Ultrasound:     0     mins  39113;    Ionto                               0    mins   85321  Self Care                       0     mins   30112  Aquatic                          0     mins 64525      Un-Timed:  Electrical Stimulation:    0     mins  86564 ( );  Dry Needling     0     mins self-pay  Traction     0     mins 54433  Low Eval     0     Mins  06711  Mod Eval     0     Mins  50132  High Eval                       0     Mins  40047  Re-Eval                           0    mins  57429      Timed Treatment:   50   mins   Total Treatment:     50    mins      PT Signature: Taya Gonsalez, PT  [place] License #: [id number]

## 2023-03-30 ENCOUNTER — HOSPITAL ENCOUNTER (OUTPATIENT)
Dept: MAMMOGRAPHY | Facility: HOSPITAL | Age: 38
Discharge: HOME | End: 2023-03-30
Admitting: NURSE PRACTITIONER
Payer: COMMERCIAL

## 2023-03-30 DIAGNOSIS — Z12.31 SCREENING MAMMOGRAM FOR BREAST CANCER: ICD-10-CM

## 2023-03-30 PROCEDURE — 77067 SCR MAMMO BI INCL CAD: CPT

## 2023-03-30 PROCEDURE — 77063 BREAST TOMOSYNTHESIS BI: CPT

## 2023-04-07 ENCOUNTER — OFFICE VISIT (OUTPATIENT)
Dept: FAMILY MEDICINE CLINIC | Facility: CLINIC | Age: 38
End: 2023-04-07
Payer: COMMERCIAL

## 2023-04-07 VITALS
DIASTOLIC BLOOD PRESSURE: 95 MMHG | OXYGEN SATURATION: 98 % | WEIGHT: 254.1 LBS | TEMPERATURE: 97.1 F | HEART RATE: 71 BPM | SYSTOLIC BLOOD PRESSURE: 133 MMHG | HEIGHT: 66 IN | BODY MASS INDEX: 40.84 KG/M2

## 2023-04-07 DIAGNOSIS — R79.82 ELEVATED C-REACTIVE PROTEIN (CRP): ICD-10-CM

## 2023-04-07 DIAGNOSIS — M25.562 CHRONIC PAIN OF BOTH KNEES: Primary | ICD-10-CM

## 2023-04-07 DIAGNOSIS — G89.29 CHRONIC PAIN OF BOTH KNEES: Primary | ICD-10-CM

## 2023-04-07 DIAGNOSIS — M25.561 CHRONIC PAIN OF BOTH KNEES: Primary | ICD-10-CM

## 2023-04-07 DIAGNOSIS — D72.829 LEUKOCYTOSIS, UNSPECIFIED TYPE: ICD-10-CM

## 2023-04-07 PROCEDURE — 99213 OFFICE O/P EST LOW 20 MIN: CPT | Performed by: NURSE PRACTITIONER

## 2023-04-07 NOTE — PROGRESS NOTES
"Chief Complaint  Rheumatoid Arthritis    Subjective        Sena Min presents to [facility]  History of Present Illness       The patient is a 37-year-old female who presents today for follow-up on lab work and chronic knee pain.    Since her last visit, she has discontinued Zoloft and denies any issues. She reports feeling \"funny\" the following week when she turns her head; however, this resolved after 1 week.    Her CRP was elevated. Her white blood cell count was elevated 11 months ago as well as with most recent check.     Her b/l knee pain and swelling have not improved. Her quadriceps were sore after mowing her yard. She did receive acupuncture, which was helpful. Her pain has improved; however, she continues to have general discomfort and function in her knees. Her pain is mostly in her knees and in her left hip. Two years ago, she also experienced pain in her right hip, but this did not last long. She took diclofenac, which was helpful.    She has an appointment with hematology on 04/08/2023.    She fractured her leg when she was 37 years old. She saw an orthopedist in [place], [place]. The orthopedist in [place] wanted her to get checked for rheumatoid arthritis. The orthopedist recommended full hip to ankle x-rays, and she needed an osteotomy. A couple of days later, she messaged her back after reviewing her MRIs, and she was told that her cartilage was damaged. She was referred to another orthopedist in [place] with more experience with the her cartilage issues. She has an appointment scheduled for 04/20/2023.    She had a sleep study more than 5 years ago. She was going to have another sleep study in late 2022; however, the cost was $ 4000 deductible. Her Fitbit says she has between 3 and 5 awakenings per hour.    Over the past 2 weeks, she has been paying more attention to her diet, how many calories she is eating- is well below what she is allowed as far as " "calories, but not been checking macros. She is near the upper end of BMI for knee surgery. 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 She was weighing 225 pounds when she had an active job. Now that she has a sedentary job, she has gained 25 pounds. Her A1c has not been checked since 05/2022. Her grandfather is diabetic.        Objective   Vital Signs:  /95   Pulse 71   Temp 97.1 °F (36.2 °C) (Temporal)   Ht 167.6 cm (66\")   Wt 115 kg (254 lb 1.6 oz)   SpO2 98%   BMI 41.01 kg/m²   Estimated body mass index is 41.01 kg/m² as calculated from the following:    Height as of this encounter: 167.6 cm (66\").    Weight as of this encounter: 115 kg (254 lb 1.6 oz).       Class 3 Severe Obesity (BMI >=40). Obesity-related health conditions include the following: osteoarthritis. Obesity is unchanged. BMI is is above average; BMI management plan is completed. We discussed portion control and increasing exercise.      Physical Exam  Vitals and nursing note reviewed.   Constitutional:       General: She is not in acute distress.     Appearance: She is well-developed. She is obese. She is not ill-appearing or diaphoretic.   HENT:      Head: Normocephalic and atraumatic.   Eyes:      General:         Right eye: No discharge.         Left eye: No discharge.      Conjunctiva/sclera: Conjunctivae normal.   Cardiovascular:      Rate and Rhythm: Normal rate and regular rhythm.      Heart sounds: Normal heart sounds.   Pulmonary:      Effort: Pulmonary effort is normal.      Breath sounds: Normal breath sounds.   Abdominal:      General: Bowel sounds are normal.      Palpations: Abdomen is soft.      Tenderness: There is no abdominal tenderness.   Musculoskeletal:         General: No deformity.      Comments: Antalgia with ambulation-appears to have some varus deformity on the right with ambulation   Skin:     General: Skin is warm and dry.   Neurological:      General: " No focal deficit present.      Mental Status: She is alert and oriented to person, place, and time.   Psychiatric:         Mood and Affect: Mood normal.         Behavior: Behavior normal.         Thought Content: Thought content normal.        Result Review :                   Assessment and Plan   Diagnoses and all orders for this visit:    1. Chronic pain of both knees (Primary)    2. Leukocytosis, unspecified type    3. Elevated C-reactive protein (CRP)         Knee pain   The patient will keep her upcoming appointment with orthopedics.    Discussed MAT for weight loss as well as decreasing carbs to <130 g/day and increasing protein to 70-100g/day.  It would probably help her knee pain quite a bit.  Pt will see if she has medication coverage for weight loss meds.      CRP elevated which may be knee pain but could be other etiology.  She also has had leukocystosis-unclear etiology-discussed DDX.  She has upcoming visit with hematology and will await eval.          Follow Up   Return if symptoms worsen or fail to improve.  Patient was given instructions and counseling regarding her condition or for health maintenance advice. Please see specific information pulled into the AVS if appropriate.       Answers for HPI/ROS submitted by the patient on 3/31/2023  What is the primary reason for your visit?: Physical  Transcribed from ambient dictation for CLINTON Vega by Treva Ludwig.  04/07/23   09:36 EDT    Patient or patient representative verbalized consent to the visit recording.  I have personally performed the services described in this document as transcribed by the above individual, and it is both accurate and complete.

## 2023-04-12 ENCOUNTER — HOSPITAL ENCOUNTER (OUTPATIENT)
Dept: MRI IMAGING | Facility: HOSPITAL | Age: 38
Discharge: HOME | End: 2023-04-12
Admitting: ORTHOPAEDIC SURGERY
Payer: COMMERCIAL

## 2023-04-12 DIAGNOSIS — M25.561 RIGHT KNEE PAIN, UNSPECIFIED CHRONICITY: ICD-10-CM

## 2023-04-12 PROCEDURE — 73721 MRI JNT OF LWR EXTRE W/O DYE: CPT

## 2023-04-13 DIAGNOSIS — D68.51 FACTOR V LEIDEN: Primary | ICD-10-CM

## 2023-04-14 ENCOUNTER — LAB (OUTPATIENT)
Dept: LAB | Facility: HOSPITAL | Age: 38
End: 2023-04-14
Payer: COMMERCIAL

## 2023-04-14 ENCOUNTER — CONSULT (OUTPATIENT)
Dept: ONCOLOGY | Facility: CLINIC | Age: 38
End: 2023-04-14
Payer: COMMERCIAL

## 2023-04-14 VITALS
WEIGHT: 253.7 LBS | SYSTOLIC BLOOD PRESSURE: 133 MMHG | HEIGHT: 66 IN | TEMPERATURE: 97.8 F | DIASTOLIC BLOOD PRESSURE: 92 MMHG | HEART RATE: 74 BPM | RESPIRATION RATE: 18 BRPM | OXYGEN SATURATION: 100 % | BODY MASS INDEX: 40.77 KG/M2

## 2023-04-14 DIAGNOSIS — D68.51 FACTOR V LEIDEN: ICD-10-CM

## 2023-04-14 DIAGNOSIS — D68.59 HYPERCOAGULABLE STATE: ICD-10-CM

## 2023-04-14 DIAGNOSIS — D68.51 HETEROZYGOUS FACTOR V LEIDEN MUTATION: Primary | ICD-10-CM

## 2023-04-14 LAB
BASOPHILS # BLD AUTO: 0.05 10*3/MM3 (ref 0–0.2)
BASOPHILS NFR BLD AUTO: 0.6 % (ref 0–1.5)
DEPRECATED RDW RBC AUTO: 43.7 FL (ref 37–54)
EOSINOPHIL # BLD AUTO: 0.19 10*3/MM3 (ref 0–0.4)
EOSINOPHIL NFR BLD AUTO: 2.1 % (ref 0.3–6.2)
ERYTHROCYTE [DISTWIDTH] IN BLOOD BY AUTOMATED COUNT: 14.1 % (ref 12.3–15.4)
HCT VFR BLD AUTO: 37.8 % (ref 34–46.6)
HGB BLD-MCNC: 12.2 G/DL (ref 12–15.9)
IMM GRANULOCYTES # BLD AUTO: 0.06 10*3/MM3 (ref 0–0.05)
IMM GRANULOCYTES NFR BLD AUTO: 0.7 % (ref 0–0.5)
LYMPHOCYTES # BLD AUTO: 1.87 10*3/MM3 (ref 0.7–3.1)
LYMPHOCYTES NFR BLD AUTO: 20.7 % (ref 19.6–45.3)
MCH RBC QN AUTO: 27.6 PG (ref 26.6–33)
MCHC RBC AUTO-ENTMCNC: 32.3 G/DL (ref 31.5–35.7)
MCV RBC AUTO: 85.5 FL (ref 79–97)
MONOCYTES # BLD AUTO: 0.42 10*3/MM3 (ref 0.1–0.9)
MONOCYTES NFR BLD AUTO: 4.7 % (ref 5–12)
NEUTROPHILS NFR BLD AUTO: 6.44 10*3/MM3 (ref 1.7–7)
NEUTROPHILS NFR BLD AUTO: 71.2 % (ref 42.7–76)
NRBC BLD AUTO-RTO: 0.2 /100 WBC (ref 0–0.2)
PLATELET # BLD AUTO: 208 10*3/MM3 (ref 140–450)
PMV BLD AUTO: 11.1 FL (ref 6–12)
RBC # BLD AUTO: 4.42 10*6/MM3 (ref 3.77–5.28)
WBC NRBC COR # BLD: 9.03 10*3/MM3 (ref 3.4–10.8)

## 2023-04-14 PROCEDURE — 36415 COLL VENOUS BLD VENIPUNCTURE: CPT

## 2023-04-14 PROCEDURE — 99204 OFFICE O/P NEW MOD 45 MIN: CPT | Performed by: INTERNAL MEDICINE

## 2023-04-14 PROCEDURE — 85025 COMPLETE CBC W/AUTO DIFF WBC: CPT

## 2023-04-14 NOTE — PROGRESS NOTES
"    .     REASON FOR CONSULTATION:  Provide an opinion on presurgical preparation, with a background of heterozygous factor V Leiden mutation.                               REQUESTING PHYSICIAN: CLINTON Vega     RECORDS OBTAINED:  Records of the patient's history including those obtained from the referring provider were reviewed and summarized in detail.    HISTORY OF PRESENT ILLNESS:  The patient is a 37 y.o. year old female who is here for initial evaluation because of heterozygous factor V Leiden mutation and she needs to have surgery.    Sena Min is a 37-year-old  female presented today for initial evaluation because of heterozygous factor V Leiden mutation.     Patient reports she was tested and confirmed for factor V Leiden mutation about 15 years ago by her OB/GYN physician.  However she was never seen a hematologist.      Recently she also had a genetic study from the [facility] company and also confirmed heterozygous factor V Leiden mutation.    Patient reports that she herself has never had any personal history of thrombosis. She does have a strong family history of factor V Leiden mutation involving multiple family members on the paternal side. Her paternal grandfather had DVT and was found to have factor V Leiden mutation.  Her sister also had factor V Leiden mutation.  Patient also reports multiple relatives on her father's side having factor V Leiden mutation including aunt and cousins.     Patient reports she was never pregnant. She does not smoke at all. She is a very rare social drinker.     Patient reports she has significant arthralgia in her knees, mostly on the right knee. She is seeking orthopedic evaluation and possible knee replacement. She previously had arthroscopic right knee surgery for a torn meniscus. She apparently did well with that without blood clots.     Patient is not having other complaints.    Patient is not on any anticoagulation medicine, nor " aspirin. She currently is only taking Protonix and diclofenac.     Past Medical History:   Diagnosis Date   • Ankle sprain    • Anxiety    • Arthritis    • Depression    • Factor 5 Leiden mutation, heterozygous    • GERD (gastroesophageal reflux disease)    • History of medical problems     Factor V Leiden   • History of torn meniscus of knee     S/P surgery   • Myopia 02/19/2015   • Obesity    • Posterior subcapsular polar senile cataract of both eyes 06/06/2022     Past Surgical History:   Procedure Laterality Date   • ANKLE OPEN REDUCTION INTERNAL FIXATION  Jan. 2001, Jan 2016    Set broken tib-fib, remove plate from fib   • FRACTURE SURGERY  01/08/2001    Plate removed 01/15/2016   • LEG SURGERY  01/2016    plate removed   • WISDOM TOOTH EXTRACTION  2009       MEDICATIONS    Current Outpatient Medications:   •  diclofenac (VOLTAREN) 75 MG EC tablet, Take 1 tablet by mouth Daily., Disp: 90 tablet, Rfl: 0  •  pantoprazole (PROTONIX) 40 MG EC tablet, Take 1 tablet by mouth Daily., Disp: 90 tablet, Rfl: 0    ALLERGIES:   No Known Allergies    SOCIAL HISTORY:       Social History     Socioeconomic History   • Marital status: Single   Tobacco Use   • Smoking status: Never   • Smokeless tobacco: Never   Vaping Use   • Vaping Use: Never used   Substance and Sexual Activity   • Alcohol use: Never   • Drug use: Never   • Sexual activity: Never   Working in construction, desk job.         FAMILY HISTORY:  Family History   Problem Relation Age of Onset   • Breast cancer Mother    • Hypertension Mother    • Arthritis Mother         Hip replacement at 61   • Cancer Mother         Breast cancer age 38   • Depression Mother    • Heart disease Mother         AFIB   • Broken bones Mother    • Hypertension Father    • Factor V Leiden deficiency Father    • Clotting disorder Father         Factor five   • Other Father         Factor V   • Factor V Leiden deficiency Sister    • Clotting disorder Sister         Factor five   • Other  "Sister         Factor V   • Other Brother         Crohn's   • Factor V Leiden deficiency Paternal Aunt    • Breast cancer Maternal Grandmother    • Cancer Maternal Grandmother         Breast cancer age 50   • Heart disease Maternal Grandmother         Heart valve repair   • Mental illness Maternal Grandmother         Dementia   • Diabetes Maternal Grandfather    • Arthritis Maternal Grandfather    • Heart disease Maternal Grandfather         Heart attack and valve replacement   • Cancer Maternal Grandfather         Lung cancer   • Alzheimer's disease Paternal Grandmother    • Mental illness Paternal Grandmother         Alzheimers   • Factor V Leiden deficiency Paternal Grandfather    • Hearing loss Paternal Grandfather         meniers   • Heart disease Paternal Grandfather         Heart valve replacement   • Clotting disorder Paternal Grandfather         Factor five   • Other Paternal Grandfather         Factor V   · Mother had breast cancer at age 38, doing well.  MGM breast cancer at age 50, doing well currently age 86 in 2023.   · Paternal grandfather age 90, diagnosed lung cancer got radiation, physically active, he has heart attack and DVT with factor V Leiden.       REVIEW OF SYSTEMS:  Review of Systems  See HPI           Vitals:    04/14/23 1012   BP: 133/92   Pulse: 74   Resp: 18   Temp: 97.8 °F (36.6 °C)   TempSrc: Temporal   SpO2: 100%   Weight: 115 kg (253 lb 11.2 oz)   Height: 167.6 cm (65.98\")   PainSc:   7   PainLoc: Knee         4/14/2023    10:16 AM   Current Status   ECOG score 1      PHYSICAL EXAM:      CONSTITUTIONAL:  Vital signs reviewed.  Well-developed female, BMI 41, no distress, looks comfortable.  EYES:  Conjunctivae and lids unremarkable.    EARS,NOSE,MOUTH,THROAT:  Ears and nose appear unremarkable.  Lips appear unremarkable.  RESPIRATORY:  Normal respiratory effort.  Lungs clear to auscultation bilaterally.  CARDIOVASCULAR:  Normal S1, S2.  No murmurs.  No significant lower extremity " edema.  GASTROINTESTINAL: Abdomen appears unremarkable.  Nontender.  No hepatomegaly.  No splenomegaly.   MUSCULOSKELETAL:  Unremarkable gait and station.  Unremarkable digits/nails.  No cyanosis or clubbing.  SKIN:  Warm.  No rashes.  PSYCHIATRIC:  Normal judgment and insight.  Normal mood and affect.      RECENT LABS:        WBC   Date Value Ref Range Status   04/14/2023 9.03 3.40 - 10.80 10*3/mm3 Final   03/13/2023 12.4 (H) 3.4 - 10.8 x10E3/uL Final   05/11/2022 11.45 (H) 3.40 - 10.80 10*3/mm3 Final   02/04/2021 7.30 3.40 - 10.80 10*3/mm3 Final   02/22/2019 8.53 3.40 - 10.80 10*3/mm3 Final   01/04/2018 8.94 4.50 - 10.70 10*3/mm3 Final   12/28/2017 10.95 (H) 4.50 - 10.70 10*3/mm3 Final   10/26/2016 7.68 4.50 - 10.70 10*3/mm3 Final   06/27/2016 8.40 4.50 - 10.70 10*3/mm3 Final   03/06/2015 7.57 4.50 - 10.70 K/Cumm Final   03/06/2015 6-10 (A) 0 - 5 /hpf Final     Hemoglobin   Date Value Ref Range Status   04/14/2023 12.2 12.0 - 15.9 g/dL Final   03/13/2023 12.0 11.1 - 15.9 g/dL Final   05/11/2022 12.3 12.0 - 15.9 g/dL Final   02/04/2021 13.1 12.0 - 15.9 g/dL Final   02/22/2019 11.2 (L) 12.0 - 15.9 g/dL Final   01/04/2018 12.1 11.9 - 15.5 g/dL Final   12/28/2017 12.4 11.9 - 15.5 g/dL Final   10/26/2016 11.9 11.9 - 15.5 g/dL Final   06/27/2016 11.9 11.9 - 15.5 g/dL Final   03/06/2015 12.2 11.9 - 15.5 g/dL Final     Platelets   Date Value Ref Range Status   04/14/2023 208 140 - 450 10*3/mm3 Final   03/13/2023 373 150 - 450 x10E3/uL Final   05/11/2022 341 140 - 450 10*3/mm3 Final   02/04/2021 279 140 - 450 10*3/mm3 Final   02/22/2019 259 140 - 450 10*3/mm3 Final   01/04/2018 301 140 - 500 10*3/mm3 Final   12/28/2017 298 140 - 500 10*3/mm3 Final   10/26/2016 322 140 - 500 10*3/mm3 Final   06/27/2016 320 140 - 500 10*3/mm3 Final   03/06/2015 299 140 - 500 K/Cumm Final       Assessment & Plan     ASSESSMENT  1.   Hypercoagulable status with heterozygous factor V Leiden mutation.   · She herself has no previous episodes of  venous thrombosis or arterial thrombosis. She does have a strong family history on her father's side with multiple family members having the same factor V Leiden mutation and DVT/PEs.   · Patient reports that she has significant arthralgia involving the knees mostly in the right knee and she is seeking orthopedic evaluation, and a possibly seeking a knee replacement. I discussed with the patient that currently she is not having any problem with thrombosis, so I will not give her any anticoagulation medicine. Nevertheless, if she does have surgery, I recommend the patient be given prophylactic anticoagulation for 1 month. I think something such as Eliquis 2.5 mg twice a day for 30 days would be a perfect strategy post-op for preventing thrombosis. I would like to see her about 1 month after her surgery. Since we do not have the timing of her surgery at this point, I would not make her appointment as of yet. She will call us.   · I also recommend if she travels, she needs to get up about every 60 to 90 minutes to walk around, either stop the car or if she is on a plane to get up and walk around it. She voiced understanding. I think it is also reasonable for her to take a baby aspirin 81 mg daily when she goes on long distance travel.    PLAN:   1. No treatment for now.  2. Patient will seek orthopedic evaluation and let us know when she is going to have knee surgery including arthroscopic surgery and knee replacement. In that case, we will start the patient on Eliquis 2.5 mg every 12 hours for 30 days post-op.  3. I would like to see her for reassessment about 1 month after her surgery.  This will be set up once we know her schedule for surgery.      Discussed with the patient and she voiced understanding of the recommendation.      HARPAL ROD M.D., Ph.D.        CC:  CLINTON Vega      Transcribed from ambient dictation for Harpal Rod MD PhD by Yessy Collins.  04/14/23   13:46 EDT    [facility] Provider  "Statement:09798::\"Patient or patient representative verbalized consent to the visit recording.\",\"I have personally performed the services described in this document as transcribed by the above individual, and it is both accurate and complete.\"                     "

## 2023-04-16 PROBLEM — D68.59 HYPERCOAGULABLE STATE: Status: ACTIVE | Noted: 2023-04-16

## 2023-06-12 DIAGNOSIS — M25.561 CHRONIC PAIN OF BOTH KNEES: ICD-10-CM

## 2023-06-12 DIAGNOSIS — G89.29 CHRONIC PAIN OF BOTH KNEES: ICD-10-CM

## 2023-06-12 DIAGNOSIS — K21.9 GASTROESOPHAGEAL REFLUX DISEASE, UNSPECIFIED WHETHER ESOPHAGITIS PRESENT: ICD-10-CM

## 2023-06-12 DIAGNOSIS — M25.562 CHRONIC PAIN OF BOTH KNEES: ICD-10-CM

## 2023-06-12 RX ORDER — PANTOPRAZOLE SODIUM 40 MG/1
40 TABLET, DELAYED RELEASE ORAL DAILY
Qty: 90 TABLET | Refills: 0 | Status: SHIPPED | OUTPATIENT
Start: 2023-06-12

## 2023-06-12 RX ORDER — DICLOFENAC SODIUM 75 MG/1
75 TABLET, DELAYED RELEASE ORAL DAILY
Qty: 90 TABLET | Refills: 0 | Status: SHIPPED | OUTPATIENT
Start: 2023-06-12

## 2023-07-28 DIAGNOSIS — D68.59 HYPERCOAGULABLE STATE: ICD-10-CM

## 2023-07-28 DIAGNOSIS — D68.51 HETEROZYGOUS FACTOR V LEIDEN MUTATION: Primary | ICD-10-CM

## 2023-07-28 DIAGNOSIS — D68.51 FACTOR V LEIDEN: ICD-10-CM

## 2023-08-21 ENCOUNTER — TRANSCRIBE ORDERS (OUTPATIENT)
Dept: PHYSICAL THERAPY | Facility: CLINIC | Age: 38
End: 2023-08-21
Payer: COMMERCIAL

## 2023-08-21 DIAGNOSIS — M17.11 OSTEOARTHRITIS OF RIGHT PATELLOFEMORAL JOINT: Primary | ICD-10-CM

## 2023-08-22 ENCOUNTER — TELEPHONE (OUTPATIENT)
Dept: FAMILY MEDICINE CLINIC | Facility: CLINIC | Age: 38
End: 2023-08-22
Payer: COMMERCIAL

## 2023-08-22 NOTE — TELEPHONE ENCOUNTER
PATIENT RETURNING CALL FROM OFFICE ABOUT HER SURGERY CLEARANCE. PATIENT SCHEDULED ON 8/28 FOR CLEARANCE. SHE IS UNSURE WHO TRIED TO CALL HER AND I DON'T SEE ANY MESSAGES DOCUMENTING THAT ANYONE CALLED. PLEASE ADVISE.

## 2023-08-28 ENCOUNTER — OFFICE VISIT (OUTPATIENT)
Dept: FAMILY MEDICINE CLINIC | Facility: CLINIC | Age: 38
End: 2023-08-28
Payer: COMMERCIAL

## 2023-08-28 VITALS
HEIGHT: 66 IN | WEIGHT: 256 LBS | TEMPERATURE: 96.9 F | DIASTOLIC BLOOD PRESSURE: 88 MMHG | HEART RATE: 74 BPM | OXYGEN SATURATION: 99 % | BODY MASS INDEX: 41.14 KG/M2 | SYSTOLIC BLOOD PRESSURE: 135 MMHG

## 2023-08-28 DIAGNOSIS — Z01.818 PREOP EXAM FOR INTERNAL MEDICINE: Primary | ICD-10-CM

## 2023-08-28 DIAGNOSIS — M25.561 CHRONIC PAIN OF BOTH KNEES: ICD-10-CM

## 2023-08-28 DIAGNOSIS — G89.29 CHRONIC PAIN OF BOTH KNEES: ICD-10-CM

## 2023-08-28 DIAGNOSIS — D68.51 HETEROZYGOUS FACTOR V LEIDEN MUTATION: ICD-10-CM

## 2023-08-28 DIAGNOSIS — M25.562 CHRONIC PAIN OF BOTH KNEES: ICD-10-CM

## 2023-08-28 PROCEDURE — 99214 OFFICE O/P EST MOD 30 MIN: CPT | Performed by: NURSE PRACTITIONER

## 2023-08-28 PROCEDURE — 93000 ELECTROCARDIOGRAM COMPLETE: CPT | Performed by: NURSE PRACTITIONER

## 2023-08-28 NOTE — PROGRESS NOTES
"Chief Complaint  Surgical Clearance (Surgery clearance for R knee alignment )    Subjective        Sena Min presents to [facility]  History of Present Illness  pt here for surgery clearance.    Right TKA 9/15/2023-partial replacement, osteotomy-Nonweightbearing x 4 weeks  Start PT foloowing Monday-scheduled already  She has appt for staple removal here scheduled as directed  Partial WB 6 weeks  Full WB 8 weeks  Restrictions x 12 weeks    At 13 weeks plan for same for left knee.      Mom and sister will help with care  She has work accommodations made so she will be able to RTW as directed by ortho.      Has been seen by heme-onc for Factor V Leiden for surgical recommendations. She has started baby asa without side effects, other than thinks her RHR is a little higher than it was before.  She has given blood last week.  She has been given anticoagulants for post-op and will see heme-onc again 1 month from surgery.      Feeling well overall.  Stomach flu x 1 day last week and now well feeling.  Stools almost returned to normal.  Planning to start probiotic.      Denies CP, palpitations, dizziness, HA, dyspnea, cough.  No melena.  No urinary sx, hematuria.        Objective   Vital Signs:  /88   Pulse 74   Temp 96.9 øF (36.1 øC)   Ht 167.6 cm (66\")   Wt 116 kg (256 lb)   SpO2 99%   BMI 41.32 kg/mý   Estimated body mass index is 41.32 kg/mý as calculated from the following:    Height as of this encounter: 167.6 cm (66\").    Weight as of this encounter: 116 kg (256 lb).               Physical Exam  Vitals and nursing note reviewed.   Constitutional:       General: She is not in acute distress.     Appearance: She is well-developed. She is not ill-appearing or diaphoretic.   HENT:      Head: Normocephalic and atraumatic.      Right Ear: Tympanic membrane, ear canal and external ear normal.      Left Ear: Tympanic membrane, ear canal and external ear normal.      " Mouth/Throat:      Mouth: Mucous membranes are moist.      Pharynx: No posterior oropharyngeal erythema.   Eyes:      General: No scleral icterus.        Right eye: No discharge.         Left eye: No discharge.      Conjunctiva/sclera: Conjunctivae normal.   Cardiovascular:      Rate and Rhythm: Normal rate and regular rhythm.      Heart sounds: Normal heart sounds.   Pulmonary:      Effort: Pulmonary effort is normal.      Breath sounds: Normal breath sounds.   Abdominal:      General: Bowel sounds are normal. There is no distension.      Palpations: Abdomen is soft.      Tenderness: There is no abdominal tenderness.   Musculoskeletal:         General: No deformity.      Cervical back: Neck supple.      Comments: Gait steady   Lymphadenopathy:      Cervical: No cervical adenopathy.   Skin:     General: Skin is warm and dry.   Neurological:      General: No focal deficit present.      Mental Status: She is alert and oriented to person, place, and time.   Psychiatric:         Mood and Affect: Mood normal.         Behavior: Behavior normal.      Result Review :              ECG 12 Lead    Date/Time: 8/28/2023 7:56 AM  Performed by: Jennifer Dobson APRN  Authorized by: Jennifer Dobson APRN   Comparison: not compared with previous ECG   Previous ECG: no previous ECG available  Rhythm: sinus rhythm  Rate: normal  BPM: 70  Conduction: conduction normal  ST Segments: ST segments normal  T Waves: T waves normal  QRS axis: normal  Other: no other findings    Clinical impression: normal ECG          Assessment and Plan   Diagnoses and all orders for this visit:    1. Preop exam for internal medicine (Primary)  -     ECG 12 Lead  -     Cancel: CBC & Differential  -     Cancel: Comprehensive Metabolic Panel  -     Cancel: Hemoglobin A1c  -     CBC & Differential; Future  -     Comprehensive Metabolic Panel; Future  -     Hemoglobin A1c; Future  -     Urinalysis With Culture If Indicated -; Future    2. Chronic pain of  both knees    3. Heterozygous factor V Leiden mutation    We will get labs today.  Normal EKG.  I think likely will give clearance.  Reviewed hematology note related to anticoagulation for travel and factor V Leiden.  Patient has Eliquis already prescribed and good understanding of side effects and when she should start these.  Patient seems to have all of her follow-up appointments already scheduled and assistance available for home.  We will get labs today as well as a urine for preop clearance.         Follow Up   Return if symptoms worsen or fail to improve, for Recheck.  Patient was given instructions and counseling regarding her condition or for health maintenance advice. Please see specific information pulled into the AVS if appropriate.       Answers submitted by the patient for this visit:  Other (Submitted on 8/21/2023)  Please describe your symptoms.: Surgery clearance with EKG for knee surgery on 9/15/23  Have you had these symptoms before?: Yes  How long have you been having these symptoms?: Greater than 2 weeks  Primary Reason for Visit (Submitted on 8/21/2023)  What is the primary reason for your visit?: Other

## 2023-09-05 ENCOUNTER — LAB (OUTPATIENT)
Dept: LAB | Facility: HOSPITAL | Age: 38
End: 2023-09-05
Payer: COMMERCIAL

## 2023-09-05 ENCOUNTER — TELEPHONE (OUTPATIENT)
Dept: ONCOLOGY | Facility: CLINIC | Age: 38
End: 2023-09-05
Payer: COMMERCIAL

## 2023-09-05 DIAGNOSIS — Z01.818 PREOP EXAM FOR INTERNAL MEDICINE: ICD-10-CM

## 2023-09-05 LAB

## 2023-09-05 PROCEDURE — 81001 URINALYSIS AUTO W/SCOPE: CPT

## 2023-09-05 PROCEDURE — 80053 COMPREHEN METABOLIC PANEL: CPT | Performed by: NURSE PRACTITIONER

## 2023-09-05 PROCEDURE — 83036 HEMOGLOBIN GLYCOSYLATED A1C: CPT | Performed by: NURSE PRACTITIONER

## 2023-09-05 PROCEDURE — 87086 URINE CULTURE/COLONY COUNT: CPT

## 2023-09-05 PROCEDURE — 85025 COMPLETE CBC W/AUTO DIFF WBC: CPT | Performed by: NURSE PRACTITIONER

## 2023-09-05 RX ORDER — APIXABAN 2.5 MG/1
TABLET, FILM COATED ORAL
Qty: 60 TABLET | Refills: 0 | OUTPATIENT
Start: 2023-09-05

## 2023-09-05 NOTE — TELEPHONE ENCOUNTER
Attempted to call no answer left message to return call.    Per Dr Hastings's dictation on 4/14/2023:  Patient will seek orthopedic evaluation and let us know when she is going to have knee surgery including arthroscopic surgery and knee replacement. In that case, we will start the patient on Eliquis 2.5 mg every 12 hours for 30 days post-op. Patient does not need Eliquis pre surgery.    Called patient to discuss Eliquis and Surgical clearance

## 2023-09-06 ENCOUNTER — TELEPHONE (OUTPATIENT)
Dept: FAMILY MEDICINE CLINIC | Facility: CLINIC | Age: 38
End: 2023-09-06
Payer: COMMERCIAL

## 2023-09-06 LAB — BACTERIA SPEC AEROBE CULT: NORMAL

## 2023-09-06 NOTE — TELEPHONE ENCOUNTER
[facility] called and was inquiring about the status of a surgery clearance form that was faxed over on 8-18 as well as 9-1.    Please advise

## 2023-09-13 DIAGNOSIS — K21.9 GASTROESOPHAGEAL REFLUX DISEASE, UNSPECIFIED WHETHER ESOPHAGITIS PRESENT: ICD-10-CM

## 2023-09-13 RX ORDER — PANTOPRAZOLE SODIUM 40 MG/1
40 TABLET, DELAYED RELEASE ORAL DAILY
Qty: 90 TABLET | Refills: 0 | Status: SHIPPED | OUTPATIENT
Start: 2023-09-13

## 2023-09-13 NOTE — TELEPHONE ENCOUNTER
Rx Refill Note  Requested Prescriptions     Pending Prescriptions Disp Refills    pantoprazole (PROTONIX) 40 MG EC tablet [Pharmacy Med Name: PANTOPRAZOLE 40MG TABLETS] 90 tablet 0     Sig: TAKE 1 TABLET BY MOUTH DAILY      Last office visit with prescribing clinician: 8/28/2023   Last telemedicine visit with prescribing clinician: Visit date not found   Next office visit with prescribing clinician: 9/29/2023                         Would you like a call back once the refill request has been completed: [] Yes [] No    If the office needs to give you a call back, can they leave a voicemail: [] Yes [] No    Foreign Patel MA  09/13/23, 11:38 EDT

## 2023-09-18 ENCOUNTER — TREATMENT (OUTPATIENT)
Dept: PHYSICAL THERAPY | Facility: CLINIC | Age: 38
End: 2023-09-18
Payer: COMMERCIAL

## 2023-09-18 DIAGNOSIS — M17.11 OSTEOARTHRITIS OF RIGHT PATELLOFEMORAL JOINT: Primary | ICD-10-CM

## 2023-09-18 DIAGNOSIS — Z47.89 ORTHOPEDIC AFTERCARE: ICD-10-CM

## 2023-09-18 DIAGNOSIS — M25.661 IMPAIRED RANGE OF MOTION OF RIGHT KNEE: ICD-10-CM

## 2023-09-18 DIAGNOSIS — R26.2 DIFFICULTY IN WALKING: ICD-10-CM

## 2023-09-18 DIAGNOSIS — M25.561 ACUTE PAIN OF RIGHT KNEE: ICD-10-CM

## 2023-09-18 NOTE — PROGRESS NOTES
Physical Therapy Initial Evaluation and Plan of Care      [address] [place]   [address]    Patient: Sena Min   : 1985  Diagnosis/ICD-10 Code:  Osteoarthritis of right patellofemoral joint [M17.11]; acute (R) knee pain M25.561; impaired ROM of (R) knee M25.661; difficulty in walking R26.2; orthopedic aftercare Z47.89  Referring practitioner: Law Ambrosio MD  Date of Initial Visit: 2023  Today's Date: 2023  Patient seen for 1 sessions           Subjective Questionnaire: LEFS:       Subjective Evaluation    History of Present Illness  Date of surgery: 9/15/2023  Mechanism of injury: - Pt reported having a patellofemoral replacement and tibial osteotomy with closed wedge procedure on 9/15/23  - pt reported she is to be NWB x 4 weeks and has no ROM restrictions.  No protocol provided.  No restrictions or precautions on referral.  Contacted office to determine if protocol/guidelines.  Awaiting response.  - Pt reported surgery was more involved than she thought and is having more difficulty getting around and performing self care ADLs than she thought    Subjective comment: Pt is 36 y/o female post surgery as noted below  Patient Occupation: [employer]- can work at home on computer   Precautions and Work Restrictions: Currently off work x 1 week. Then will return to working from homePain  Current pain ratin  At best pain ratin  At worst pain ratin  Location: (R) patellar  Quality: sharp and dull ache  Relieving factors: medications and ice (elevating)  Exacerbated by: While performing transfers. lowering or lifting leg.    Social Support  Lives in: multiple-level home (Currently staying with Dad - able to stay on 1st floor there with 1 step entry)  Lives with: alone (pt is currently staying with her father and his wife who are providing transportation and assisting with transfers and ADLs)    Treatments  Previous treatment: physical therapy  (prior to surgery)  Patient Goals  Patient goals for therapy: decreased pain, increased strength, increased motion, return to work and return to sport/leisure activities  Patient goal: walking the dog; paying with nieces         PMHx: depression; anxiety; h/o (R) meniscus repair    Objective          Observations     Additional Knee Observation Details  (R)  knee dressed and wrapped.  Drain in place.  No drainage observed on wrap or dressing    Active Range of Motion   Left Knee   Flexion: WFL  Extension: WFL    Additional Active Range of Motion Details  (R) knee AROM 0-10-41 deg    Strength/Myotome Testing     Left Knee   Flexion: 5  Extension: 5  Quadriceps contraction: fair    Right Knee   Flexion: 3-  Extension: 3-  Quadriceps contraction: poor    Additional Strength Details  -Pt is able to perform (R) SLR     Ambulation     Comments   Pt presents to therapy in manual wheelchair  Transfers:  -Stand pivot sit with RWX and CGA.  Patient NWB at (R) LE   - Stand pivot sit from w/c to comode with CGA and use of grab bar.  Patient required mod A for lower body clothing management  - Supine to/from sit on mat table with min A for lifting (R) LE on and off table     Gait:  - Pt is currently NWB (R) LE. Able to perform turns to perform transfers only at this time        Assessment & Plan       Assessment  Impairments: abnormal gait, abnormal or restricted ROM, activity intolerance, impaired balance, impaired physical strength, lacks appropriate home exercise program, pain with function and weight-bearing intolerance   Functional limitations: carrying objects, walking, uncomfortable because of pain, sitting and standing   Assessment details: Pt is a 37 yr/o femake presenting with knee pain; impaired ROM, strength and  impaired mobility and function following knee surgery.     Per LEFS, reporting 88.75 % functional impairment.     Pt presents to evaluation in manual w/c.  Patient is currently NWB (R) LE.  Using Rwx for SPS  transfers with CGA and required mod A for lower body clothing management during toileting and min A to lift LE on and off mat table during supine <> sit transfers.  Patient's father and his wife are currently performing all driving, household tasks.  Pt requires assistance for self care ADLs. Pt is currently staying with her father and his wife.     PLOF: pt was working full time on -site, (I) with driving, household and self care ADLs without AD and was living alone    Education given on results of eval, as well as review of initial HEP. Pt with good understanding. Recommend skilled OPPT to address above issues. Pt in agreement.   Prognosis: good    Goals  Plan Goals: STG: to be met within 10 visits   1. Pt to be (I) with initial HEP for self management of impairments: PROGRESSING, initial HEP issued at evaluation.   2. Pt will be mod (I) with sit <> stand, SPS and supine <> sit transfers  3. Pt will demonstrate (R) knee AROM : 0-5-110 deg for improved ability to perform transitions and transfers.   4. Pt will report highest level of pain as 4/10 for improved tolerance to activity and improved restorative sleep pattern      LTG: to be met by DC - 20 visits  1. Pt to be (I) with finalized HEP  2. Pt to report improved function per LEFS to greater than 80% for improved function and in preparation for (L) knee surgery in 12/2023  3. Pt will demonstrate (R) knee flex/ext and (R) hip flex/abd/ext = 5/5 for improved function  4. Pt will have ability to ambulate 15 minutes or greater without AD for community mobility and ability to return to work on-site.     Plan  Therapy options: will be seen for skilled therapy services  Planned modality interventions: ultrasound, cryotherapy, electrical stimulation/Russian stimulation, thermotherapy (hydrocollator packs), dry needling and TENS  Planned therapy interventions: balance/weight-bearing training, body mechanics training, flexibility, gait training, home exercise program,  joint mobilization, manual therapy, neuromuscular re-education, soft tissue mobilization, spinal/joint mobilization, strengthening, therapeutic activities and stretching  Other planned therapy interventions: aquatic therapy / massage / Reiki therapy  Frequency: 2x week  Duration in visits: 20  Duration in weeks: 13  Treatment plan discussed with: patient      - 2 week f.u with local PCP and tele-visit with ortho in [place].  At 6 weeks, pt will return to ortho clinic in [place].    12/15/23: (L) knee surgery scheduled    History # of Personal Factors and/or Comorbidities: MODERATE (1-2)  Examination of Body System(s): # of elements: MODERATE (3)  Clinical Presentation: EVOLVING  Clinical Decision Making: MODERATE      Timed:         Manual Therapy:    0     mins  06824;     Therapeutic Exercise:    20     mins  08404;     Neuromuscular Ramana:    0    mins  55296;    Therapeutic Activity:     10     mins  19086;     Gait Trainin     mins  87650;     Ultrasound:     0     mins  98802;    Ionto                               0    mins   87261  Self Care                       0     mins   23318  Aquatic                          0     mins 82822      Un-Timed:  Electrical Stimulation:    0     mins  46435 ( );  Dry Needling     0     mins self-pay  Traction     0     mins 14195  Low Eval     0     Mins  23384  Mod Eval     30     Mins  17125  High Eval                       0     Mins  63751  Re-Eval                           0    mins  60048        Timed Treatment:   30   mins   Total Treatment:     60   mins    PT SIGNATURE: Tyaa Gonsalez PT   [place] License#: [id number]      Electronically signed by Taya Gonsalez PT, 23, 4:09 PM EDT      Initial Certification  Certification Period:  2023 thru 2023  I certify that the therapy services are furnished while this patient is under my care.  The services outlined above are required by this patient, and will be reviewed every 90 days.       Physician  Signature:__________________________________________________    PHYSICIAN: Law Ambrosio MD      DATE:     Please sign and return via fax to [phone].. Thank you, [facility].

## 2023-09-20 ENCOUNTER — TREATMENT (OUTPATIENT)
Dept: PHYSICAL THERAPY | Facility: CLINIC | Age: 38
End: 2023-09-20
Payer: COMMERCIAL

## 2023-09-20 DIAGNOSIS — M17.11 OSTEOARTHRITIS OF RIGHT PATELLOFEMORAL JOINT: Primary | ICD-10-CM

## 2023-09-20 DIAGNOSIS — M22.2X1 PATELLOFEMORAL DISORDER OF BOTH KNEES: ICD-10-CM

## 2023-09-20 DIAGNOSIS — Z47.89 ORTHOPEDIC AFTERCARE: ICD-10-CM

## 2023-09-20 DIAGNOSIS — R26.2 DIFFICULTY IN WALKING: ICD-10-CM

## 2023-09-20 DIAGNOSIS — M25.661 IMPAIRED RANGE OF MOTION OF RIGHT KNEE: ICD-10-CM

## 2023-09-20 DIAGNOSIS — M25.561 ACUTE PAIN OF RIGHT KNEE: ICD-10-CM

## 2023-09-20 DIAGNOSIS — M22.2X2 PATELLOFEMORAL DISORDER OF BOTH KNEES: ICD-10-CM

## 2023-09-20 NOTE — PROGRESS NOTES
Physical Therapy Treatment Note    [address]   [place] [address]  [phone] (p)  [phone] (f)    VISIT#: 2     Diagnosis Plan   1. Osteoarthritis of right patellofemoral joint        2. Acute pain of right knee        3. Impaired range of motion of right knee        4. Difficulty in walking        5. Orthopedic aftercare        6. Patellofemoral disorder of both knees          Subjective   Sena Harrisaliceadam reports: Doing HEP no questions. Pain 3/10 currently. Still staying with Dad. Working on transitioning back to her home. Practiced the 3 steps into door with her sister today. Did not go inside, home is trilevel. 1 railing and wall on steps to basement, bed and bath on that floor. Mother can help with food etc until pt is able to access top floor. She is also considering attending  3 hours away this weekend. She also was able to use bathroom and get dressed independently today. Transfer W/C to chair and bed independently with rolling walker, still needs assist for R LE supine/sit. Will be working from home for a few weeks.    Objective     See Exercise, Manual, and Modality Logs for complete treatment.     Patient education: hip alignement with seated heel slides, avoid hip add/IR position    Medial thigh bruising decreasing. Drain and waterproof dressing in place.  Silver dollar size [place] of redness superomedial edge of dressing, pt reports has been present since surgery and decreasing. Pt to send picture to PCP tomorrow (surgeon in [place], PCP in charge of care in [place])    Negative calf squeeze test    Mod A for UE assist supine/sit.    Assessment/Plan Good tolerance to exercise. Pt requests estim for pain modulation, no contraindications, positive response with decreased pain.       Progress per Plan of Care Monitor medial thigh redness            Timed:         Manual Therapy:         mins  76005;     Therapeutic Exercise:    30     mins  70880;     Neuromuscular Ramana:         mins  25918;    Therapeutic Activity:          mins  79025;     Gait Training:           mins  48888;     Ultrasound:          mins  39889;    Ionto                                   mins   36231  Self Care                            mins   09221  Canalith Repos                   mins  4209  Aquatic                               mins 08524    Un-Timed:  Electrical Stimulation:    15     mins  77089 ( );  Dry Needling          mins self-pay  Traction          mins 59304  Low Eval          Mins  23177  Mod Eval          Mins  41433  High Eval                            Mins  57213  Re-Eval                               mins  61305    Timed Treatment:   30   mins   Total Treatment:     45   mins    Samantha Helm PT, DPT  [place] LICENCE: [id number]

## 2023-09-25 ENCOUNTER — TREATMENT (OUTPATIENT)
Dept: PHYSICAL THERAPY | Facility: CLINIC | Age: 38
End: 2023-09-25

## 2023-09-25 DIAGNOSIS — M25.661 IMPAIRED RANGE OF MOTION OF RIGHT KNEE: ICD-10-CM

## 2023-09-25 DIAGNOSIS — M25.562 CHRONIC PAIN OF BOTH KNEES: ICD-10-CM

## 2023-09-25 DIAGNOSIS — M22.2X1 PATELLOFEMORAL DISORDER OF BOTH KNEES: ICD-10-CM

## 2023-09-25 DIAGNOSIS — M25.561 CHRONIC PAIN OF BOTH KNEES: ICD-10-CM

## 2023-09-25 DIAGNOSIS — G89.29 CHRONIC PAIN OF BOTH KNEES: ICD-10-CM

## 2023-09-25 DIAGNOSIS — M25.561 ACUTE PAIN OF RIGHT KNEE: ICD-10-CM

## 2023-09-25 DIAGNOSIS — R26.2 DIFFICULTY IN WALKING: ICD-10-CM

## 2023-09-25 DIAGNOSIS — M22.2X2 PATELLOFEMORAL DISORDER OF BOTH KNEES: ICD-10-CM

## 2023-09-25 DIAGNOSIS — M17.11 OSTEOARTHRITIS OF RIGHT PATELLOFEMORAL JOINT: Primary | ICD-10-CM

## 2023-09-25 DIAGNOSIS — Z47.89 ORTHOPEDIC AFTERCARE: ICD-10-CM

## 2023-09-25 PROCEDURE — 97110 THERAPEUTIC EXERCISES: CPT | Performed by: PHYSICAL THERAPIST

## 2023-09-25 PROCEDURE — 97014 ELECTRIC STIMULATION THERAPY: CPT | Performed by: PHYSICAL THERAPIST

## 2023-09-25 NOTE — PROGRESS NOTES
Physical Therapy Daily Treatment Note    [address] [place]      Patient: Sena Min   : 1985  Referring practitioner: Law Ambrosio MD  Date of Initial Visit: Type: THERAPY  Noted: 2023  Today's Date: 2023  Patient seen for 3 sessions       Visit Diagnoses:    ICD-10-CM ICD-9-CM   1. Osteoarthritis of right patellofemoral joint  M17.11 715.36   2. Acute pain of right knee  M25.561 719.46   3. Impaired range of motion of right knee  M25.661 719.56   4. Difficulty in walking  R26.2 719.7   5. Orthopedic aftercare  Z47.89 V54.9   6. Patellofemoral disorder of both knees  M22.2X1 719.96    M22.2X2    7. Chronic pain of both knees  M25.561 719.46    M25.562 338.29    G89.29        Subjective Evaluation    History of Present Illness    Subjective comment: Off pain meds now and happy with Tylenol. Not sleeping well but quad getting stronger and can now transfer and lift her leg better. 1/10 pain     Objective   See Exercise, Manual, and Modality Logs for complete treatment.       Assessment & Plan       Assessment  Assessment details: Pt back to work today doing computer activities from home. Also will be traveling to [place] after PT today to get accupuncture which she enjoys on a regular basis. States she won't be receiving rx to R knee today.  Still some ecchymosis and hardness along medial thigh but no regressing.   Incision far from being mature but no drainage.     Good progress with heel slides on SB today up to 82 flex vs. 41 at eval.     P: try nu step next rx at no resistance (protocol allows biking with no resistance)        Timed:         Manual Therapy:         mins  59321;     Therapeutic Exercise:    30     mins  96154;     Neuromuscular Ramana:        mins  19425;    Therapeutic Activity:          mins  73191;     Gait Training:           mins  16012;     Ultrasound:          mins  01328;    Ionto                                   mins   32490  Self Care                             mins   56158  [facility]         mins 05214  Work hardening   __   min 58587/96058      Un-Timed:  Electrical Stimulation:    15     mins  16642 ( );  Dry Needling          mins self-pay  Traction          mins 29879      Timed Treatment:   30   mins   Total Treatment:     45   mins    Zorre Zeno Kimura, PT  [place] License: [id number]    [place] License:  [id number]

## 2023-09-28 ENCOUNTER — TREATMENT (OUTPATIENT)
Dept: PHYSICAL THERAPY | Facility: CLINIC | Age: 38
End: 2023-09-28
Payer: COMMERCIAL

## 2023-09-28 DIAGNOSIS — M25.661 IMPAIRED RANGE OF MOTION OF RIGHT KNEE: ICD-10-CM

## 2023-09-28 DIAGNOSIS — R26.2 DIFFICULTY IN WALKING: ICD-10-CM

## 2023-09-28 DIAGNOSIS — M25.561 ACUTE PAIN OF RIGHT KNEE: ICD-10-CM

## 2023-09-28 DIAGNOSIS — M17.11 OSTEOARTHRITIS OF RIGHT PATELLOFEMORAL JOINT: Primary | ICD-10-CM

## 2023-09-28 PROCEDURE — 97110 THERAPEUTIC EXERCISES: CPT | Performed by: PHYSICAL THERAPIST

## 2023-09-28 PROCEDURE — 97014 ELECTRIC STIMULATION THERAPY: CPT | Performed by: PHYSICAL THERAPIST

## 2023-09-29 ENCOUNTER — OFFICE VISIT (OUTPATIENT)
Dept: FAMILY MEDICINE CLINIC | Facility: CLINIC | Age: 38
End: 2023-09-29
Payer: COMMERCIAL

## 2023-09-29 ENCOUNTER — HOSPITAL ENCOUNTER (OUTPATIENT)
Dept: GENERAL RADIOLOGY | Facility: HOSPITAL | Age: 38
Discharge: HOME | End: 2023-09-29
Admitting: NURSE PRACTITIONER
Payer: COMMERCIAL

## 2023-09-29 VITALS
BODY MASS INDEX: 41.32 KG/M2 | SYSTOLIC BLOOD PRESSURE: 118 MMHG | HEIGHT: 66 IN | TEMPERATURE: 97.3 F | HEART RATE: 86 BPM | OXYGEN SATURATION: 100 % | DIASTOLIC BLOOD PRESSURE: 78 MMHG

## 2023-09-29 DIAGNOSIS — Z98.890 STATUS POST KNEE SURGERY: ICD-10-CM

## 2023-09-29 DIAGNOSIS — Z09 HOSPITAL DISCHARGE FOLLOW-UP: Primary | ICD-10-CM

## 2023-09-29 PROCEDURE — 73560 X-RAY EXAM OF KNEE 1 OR 2: CPT

## 2023-09-29 NOTE — PROGRESS NOTES
"Chief Complaint  Follow-up (F/u R knee surgery )    Subjective        Sena Min presents to [facility]  History of Present Illness  pt here for f/u s/p right knee arthroscopy 9/15/23.  D/C from [place] 9/17/23.  Had quite a bit post op nausea/vomiting now resolved and eating well.  Off pain meds and continues NWB and working with PT.  ROM is good and continues to improve. Sutures dissolving and no s/s post op complications.  Continues eliquis without s/s bleeding, bruising.   No constipation.  No fever, chills. She is to complete 1 month on eliquis post op.      Had post-op televisit with provider and needs routine right knee XR done today.      Objective   Vital Signs:  /78   Pulse 86   Temp 97.3 °F (36.3 °C)   Ht 167.6 cm (66\")   SpO2 100%   BMI 41.32 kg/m²   Estimated body mass index is 41.32 kg/m² as calculated from the following:    Height as of this encounter: 167.6 cm (66\").    Weight as of 8/28/23: 116 kg (256 lb).               Physical Exam  Vitals and nursing note reviewed.   Constitutional:       General: She is not in acute distress.     Appearance: She is well-developed. She is not ill-appearing or diaphoretic.   HENT:      Head: Normocephalic and atraumatic.   Eyes:      General:         Right eye: No discharge.         Left eye: No discharge.      Conjunctiva/sclera: Conjunctivae normal.   Cardiovascular:      Rate and Rhythm: Normal rate and regular rhythm.      Heart sounds: Normal heart sounds.   Pulmonary:      Effort: Pulmonary effort is normal.      Breath sounds: Normal breath sounds.   Abdominal:      General: Bowel sounds are normal.      Palpations: Abdomen is soft.      Tenderness: There is no abdominal tenderness.   Musculoskeletal:         General: No deformity.      Comments: Right knee incision healing well, min swelling, no erythema, NTTP, good ROM   Skin:     General: Skin is warm and dry.   Neurological:      General: No focal " deficit present.      Mental Status: She is alert and oriented to person, place, and time.   Psychiatric:         Mood and Affect: Mood normal.         Behavior: Behavior normal.      Result Review :                   Assessment and Plan   Diagnoses and all orders for this visit:    1. Hospital discharge follow-up (Primary)    2. Status post knee surgery  -     XR Knee 1 or 2 View Right; Future    Reviewed and discussed discharge summary.  Meds reconciled.  Pt has excellent understanding of tx plan, recovery process, medications.  Very compliant and motivated to continue recovery.  No complications.      XR right knee ordered.  Pt will let surgeon know when it has been resulted for review.      Labs at surgery showed mild anemia, normal CBC, renal fx, electrolytes.           Follow Up   No follow-ups on file.  Patient was given instructions and counseling regarding her condition or for health maintenance advice. Please see specific information pulled into the AVS if appropriate.       Answers submitted by the patient for this visit:  Other (Submitted on 9/22/2023)  Please describe your symptoms.: Post surgery. Stitches removal and X-ray  Have you had these symptoms before?: No  How long have you been having these symptoms?: 1-2 weeks  Please list any medications you are currently taking for this condition.: Oxycodone -acetaminophen 5-325 (1-2 tabs 4-6hrs), Eliquis 2.5mg (1 tab 2xday), Stool softener (1tab 1xday)  Primary Reason for Visit (Submitted on 9/22/2023)  What is the primary reason for your visit?: Other

## 2023-10-03 ENCOUNTER — TREATMENT (OUTPATIENT)
Dept: PHYSICAL THERAPY | Facility: CLINIC | Age: 38
End: 2023-10-03
Payer: COMMERCIAL

## 2023-10-03 DIAGNOSIS — M25.561 ACUTE PAIN OF RIGHT KNEE: ICD-10-CM

## 2023-10-03 DIAGNOSIS — R26.2 DIFFICULTY IN WALKING: ICD-10-CM

## 2023-10-03 DIAGNOSIS — M25.661 IMPAIRED RANGE OF MOTION OF RIGHT KNEE: ICD-10-CM

## 2023-10-03 DIAGNOSIS — M17.11 OSTEOARTHRITIS OF RIGHT PATELLOFEMORAL JOINT: Primary | ICD-10-CM

## 2023-10-03 NOTE — PROGRESS NOTES
Physical Therapy Treatment  Note  [address] [facility] [place]   [address]     Diagnosis Plan   1. Osteoarthritis of right patellofemoral joint        2. Acute pain of right knee        3. Impaired range of motion of right knee        4. Difficulty in walking            VISIT#: 5    Subjective   Sean Min reports: no pain upon arrival to therapy.  Patient reported she went to acupuncture as cleared by surgeon to do so.  Patient reported improved mobility following.       Objective     See Exercise, Manual, and Modality Logs for complete treatment.     Patient Education:    Goals  Plan Goals: STG: to be met within 10 visits   1. Pt to be (I) with initial HEP for self management of impairments: PROGRESSING, initial HEP issued at evaluation.   2. Pt will be mod (I) with sit <> stand, SPS and supine <> sit transfers  3. Pt will demonstrate (R) knee AROM : 0-5-110 deg for improved ability to perform transitions and transfers.   4. Pt will report highest level of pain as 4/10 for improved tolerance to activity and improved restorative sleep pattern        LTG: to be met by DC - 20 visits  1. Pt to be (I) with finalized HEP  2. Pt to report improved function per LEFS to greater than 80% for improved function and in preparation for (L) knee surgery in 12/2023  3. Pt will demonstrate (R) knee flex/ext and (R) hip flex/abd/ext = 5/5 for improved function  4. Pt will have ability to ambulate 15 minutes or greater without AD for community mobility and ability to return to work on-site.   Assessment/Plan  - Increased reps from 1 x 10 to 2 x 10 for many activities this date.  Patient tolerated with muscular fatigue  - Added patellar mobs in all planes as per surgeon's protocol guidelines  - IFC and ice utilized at end of session for pain modulation and control of inflammation.   Progress per Plan of Care            Timed:         Manual Therapy:    5     mins  59175;     Therapeutic Exercise:    40     mins   71831;     Neuromuscular Ramana:    0    mins  96867;    Therapeutic Activity:     0     mins  05975;     Gait Trainin     mins  91100;     Ultrasound:     0     mins  86081;    Ionto                               0    mins   80445  Self Care                       0     mins   41794  Canalith Repos               0    mins  4209  Aquatic                          0     mins 76274    Un-Timed:  Electrical Stimulation:    15     mins  42822 ( );  Dry Needling     0     mins self-pay  Traction     0     mins 38064  Low Eval     0     Mins  72936  Mod Eval     0     Mins  74794  High Eval                       0     Mins  17774  Re-Eval                           0    mins  88341    Timed Treatment:   45   mins   Total Treatment:     60   mins    Taya Gonsalez, PT PT, DPT, [id number]

## 2023-10-05 ENCOUNTER — TREATMENT (OUTPATIENT)
Dept: PHYSICAL THERAPY | Facility: CLINIC | Age: 38
End: 2023-10-05
Payer: COMMERCIAL

## 2023-10-05 DIAGNOSIS — M25.561 ACUTE PAIN OF RIGHT KNEE: ICD-10-CM

## 2023-10-05 DIAGNOSIS — R26.2 DIFFICULTY IN WALKING: ICD-10-CM

## 2023-10-05 DIAGNOSIS — M17.11 OSTEOARTHRITIS OF RIGHT PATELLOFEMORAL JOINT: Primary | ICD-10-CM

## 2023-10-05 DIAGNOSIS — M25.661 IMPAIRED RANGE OF MOTION OF RIGHT KNEE: ICD-10-CM

## 2023-10-05 NOTE — PROGRESS NOTES
"Physical Therapy Treatment  Note  [address] [place]   [address]     Diagnosis Plan   1. Osteoarthritis of right patellofemoral joint        2. Acute pain of right knee        3. Impaired range of motion of right knee        4. Difficulty in walking            VISIT#: 6    Subjective   Sena Min reports: no pain upon arrival to therapy.  Patient reported she has to take Benadryl to aide in sleep.  Patient reported she is taking Tylenol but not necessarily for the knee \" for everything else\"    Objective     See Exercise, Manual, and Modality Logs for complete treatment.     Patient Education:    Goals  Plan Goals: STG: to be met within 10 visits   1. Pt to be (I) with initial HEP for self management of impairments: PROGRESSING, initial HEP issued at evaluation.   2. Pt will be mod (I) with sit <> stand, SPS and supine <> sit transfers  3. Pt will demonstrate (R) knee AROM : 0-5-110 deg for improved ability to perform transitions and transfers.   4. Pt will report highest level of pain as 4/10 for improved tolerance to activity and improved restorative sleep pattern        LTG: to be met by DC - 20 visits  1. Pt to be (I) with finalized HEP  2. Pt to report improved function per LEFS to greater than 80% for improved function and in preparation for (L) knee surgery in 12/2023  3. Pt will demonstrate (R) knee flex/ext and (R) hip flex/abd/ext = 5/5 for improved function  4. Pt will have ability to ambulate 15 minutes or greater without AD for community mobility and ability to return to work on-site.   Assessment/Plan  - Pt tolerated treatment session well without c/o provocation of pain.  Patient continues to demonstrate compensatory strategies (ex: using glutes)  during quad setting activities secondary to impaired contraction and strength.  Verbal and tactile cueing provided for improved technique   - IFC and ice utilized at end of session for pain modulation and control of inflammation. "   Progress per Plan of Care            Timed:         Manual Therapy:    5     mins  47998;     Therapeutic Exercise:    35    mins  78892;     Neuromuscular Ramana:    0    mins  35877;    Therapeutic Activity:     0     mins  34511;     Gait Trainin     mins  40195;     Ultrasound:     0     mins  59157;    Ionto                               0    mins   40736  Self Care                       0     mins   23092  Canalith Repos               0    mins  4209  Aquatic                          0     mins 44370    Un-Timed:  Electrical Stimulation:    15     mins  01598 ( );  Dry Needling     0     mins self-pay  Traction     0     mins 79034  Low Eval     0     Mins  18336  Mod Eval     0     Mins  90523  High Eval                       0     Mins  76836  Re-Eval                           0    mins  30872    Timed Treatment:   40  mins   Total Treatment:     55   mins    Taya Gonsalez, PT PT, DPT, [id number]

## 2023-10-09 ENCOUNTER — TREATMENT (OUTPATIENT)
Dept: PHYSICAL THERAPY | Facility: CLINIC | Age: 38
End: 2023-10-09
Payer: COMMERCIAL

## 2023-10-09 DIAGNOSIS — M17.11 OSTEOARTHRITIS OF RIGHT PATELLOFEMORAL JOINT: Primary | ICD-10-CM

## 2023-10-09 DIAGNOSIS — R26.2 DIFFICULTY IN WALKING: ICD-10-CM

## 2023-10-09 DIAGNOSIS — Z47.89 ORTHOPEDIC AFTERCARE: ICD-10-CM

## 2023-10-09 DIAGNOSIS — M25.561 ACUTE PAIN OF RIGHT KNEE: ICD-10-CM

## 2023-10-09 DIAGNOSIS — M25.661 IMPAIRED RANGE OF MOTION OF RIGHT KNEE: ICD-10-CM

## 2023-10-09 PROCEDURE — 97014 ELECTRIC STIMULATION THERAPY: CPT

## 2023-10-09 PROCEDURE — 97110 THERAPEUTIC EXERCISES: CPT

## 2023-10-09 NOTE — PROGRESS NOTES
Physical Therapy Treatment  Note  [address] [facility] [place]   [address]     Diagnosis Plan   1. Osteoarthritis of right patellofemoral joint        2. Acute pain of right knee        3. Impaired range of motion of right knee        4. Difficulty in walking        5. Orthopedic aftercare            VISIT#: 7    Subjective   Sena Min reports: denied (R) knee pain.  Patient reported intermittent pain at (R) anterior thigh and hip. Patient reported this pain is brief.     Objective     See Exercise, Manual, and Modality Logs for complete treatment.     Patient Education:    Goals  Plan Goals: STG: to be met within 10 visits   1. Pt to be (I) with initial HEP for self management of impairments: PROGRESSING, initial HEP issued at evaluation.   2. Pt will be mod (I) with sit <> stand, SPS and supine <> sit transfers  3. Pt will demonstrate (R) knee AROM : 0-5-110 deg for improved ability to perform transitions and transfers.   4. Pt will report highest level of pain as 4/10 for improved tolerance to activity and improved restorative sleep pattern        LTG: to be met by DC - 20 visits  1. Pt to be (I) with finalized HEP  2. Pt to report improved function per LEFS to greater than 80% for improved function and in preparation for (L) knee surgery in 12/2023  3. Pt will demonstrate (R) knee flex/ext and (R) hip flex/abd/ext = 5/5 for improved function  4. Pt will have ability to ambulate 15 minutes or greater without AD for community mobility and ability to return to work on-site.   Assessment/Plan  - Added seated QS, gastroc stretch and hamstring stretch with lower leg supported on swiss ball this date  - Increased resistance and duration on Nustep this date with good tolerance observed.   - Will progress to 50% WB at end of week (4 weeks post -op)   - IFC and ice utilized at end of session for pain modulation and control of inflammation.   Progress per Plan of Care            Timed:         Manual  Therapy:    0    mins  72687;     Therapeutic Exercise:    35    mins  04683;     Neuromuscular Ramana:    0    mins  27374;    Therapeutic Activity:     0     mins  47090;     Gait Trainin     mins  14482;     Ultrasound:     0     mins  91463;    Ionto                               0    mins   81824  Self Care                       0     mins   61705  Canalith Repos               0    mins  4209  Aquatic                          0     mins 14785    Un-Timed:  Electrical Stimulation:    15     mins  55376 ( );  Dry Needling     0     mins self-pay  Traction     0     mins 50487  Low Eval     0     Mins  39453  Mod Eval     0     Mins  61374  High Eval                       0     Mins  37676  Re-Eval                           0    mins  83522    Timed Treatment:   35  mins   Total Treatment:     50   mins    Taya Gonsalez, PT PT, DPT, [id number]

## 2023-10-12 ENCOUNTER — TREATMENT (OUTPATIENT)
Dept: PHYSICAL THERAPY | Facility: CLINIC | Age: 38
End: 2023-10-12
Payer: COMMERCIAL

## 2023-10-12 DIAGNOSIS — M17.11 OSTEOARTHRITIS OF RIGHT PATELLOFEMORAL JOINT: Primary | ICD-10-CM

## 2023-10-12 DIAGNOSIS — R26.2 DIFFICULTY IN WALKING: ICD-10-CM

## 2023-10-12 DIAGNOSIS — M25.661 IMPAIRED RANGE OF MOTION OF RIGHT KNEE: ICD-10-CM

## 2023-10-12 DIAGNOSIS — M25.561 ACUTE PAIN OF RIGHT KNEE: ICD-10-CM

## 2023-10-12 NOTE — PROGRESS NOTES
"Physical Therapy Treatment  Note  [address] [place]   [address]     Diagnosis Plan   1. Osteoarthritis of right patellofemoral joint        2. Acute pain of right knee        3. Impaired range of motion of right knee        4. Difficulty in walking            VISIT#: 8    Subjective   Sena Min reports: \" today is good\".  Patient reported she stood and put a little weight on her right LE yesterday.  Patient reported \"it did not hurt but felt weird\".    Objective     See Exercise, Manual, and Modality Logs for complete treatment.     Patient Education:    Goals  Plan Goals: STG: to be met within 10 visits   1. Pt to be (I) with initial HEP for self management of impairments: PROGRESSING, initial HEP issued at evaluation.   2. Pt will be mod (I) with sit <> stand, SPS and supine <> sit transfers  3. Pt will demonstrate (R) knee AROM : 0-5-110 deg for improved ability to perform transitions and transfers.   4. Pt will report highest level of pain as 4/10 for improved tolerance to activity and improved restorative sleep pattern        LTG: to be met by DC - 20 visits  1. Pt to be (I) with finalized HEP  2. Pt to report improved function per LEFS to greater than 80% for improved function and in preparation for (L) knee surgery in 12/2023  3. Pt will demonstrate (R) knee flex/ext and (R) hip flex/abd/ext = 5/5 for improved function  4. Pt will have ability to ambulate 15 minutes or greater without AD for community mobility and ability to return to work on-site.   Assessment/Plan  - Initiated gait training this date at 50% WB as per surgeon's guidelines.  Began in // bars, progressing to RWX.  Cueing for technique and to maintain WB status.  Patient denied pain but reported muscular fatigue  - IFC and ice utilized at end of session for pain modulation and control of inflammation.   Progress per Plan of Care            Timed:         Manual Therapy:    0    mins  52711;     Therapeutic Exercise:   "  38    mins  08994;     Neuromuscular Ramana:    0    mins  81938;    Therapeutic Activity:     6     mins  36838;     Gait Trainin     mins  15851;     Ultrasound:     0     mins  70740;    Ionto                               0    mins   77103  Self Care                       0     mins   22868  Canalith Repos               0    mins  4209  Aquatic                          0     mins 90975    Un-Timed:  Electrical Stimulation:    15     mins  93891 ( );  Dry Needling     0     mins self-pay  Traction     0     mins 13494  Low Eval     0     Mins  38268  Mod Eval     0     Mins  60898  High Eval                       0     Mins  60457  Re-Eval                           0    mins  52132    Timed Treatment:   44  mins   Total Treatment:     59   mins    Taya Gonsalez, PT PT, DPT, [id number]

## 2023-10-16 ENCOUNTER — LAB (OUTPATIENT)
Dept: LAB | Facility: HOSPITAL | Age: 38
End: 2023-10-16
Payer: COMMERCIAL

## 2023-10-16 ENCOUNTER — OFFICE VISIT (OUTPATIENT)
Dept: ONCOLOGY | Facility: CLINIC | Age: 38
End: 2023-10-16
Payer: COMMERCIAL

## 2023-10-16 VITALS
WEIGHT: 245.9 LBS | TEMPERATURE: 98.2 F | HEIGHT: 66 IN | RESPIRATION RATE: 18 BRPM | SYSTOLIC BLOOD PRESSURE: 132 MMHG | DIASTOLIC BLOOD PRESSURE: 82 MMHG | HEART RATE: 94 BPM | BODY MASS INDEX: 39.52 KG/M2 | OXYGEN SATURATION: 98 %

## 2023-10-16 DIAGNOSIS — D68.51 HETEROZYGOUS FACTOR V LEIDEN MUTATION: Primary | ICD-10-CM

## 2023-10-16 DIAGNOSIS — D68.51 FACTOR V LEIDEN: ICD-10-CM

## 2023-10-16 DIAGNOSIS — Z86.2 HISTORY OF IRON DEFICIENCY ANEMIA: ICD-10-CM

## 2023-10-16 DIAGNOSIS — D68.59 HYPERCOAGULABLE STATE: ICD-10-CM

## 2023-10-16 DIAGNOSIS — D68.51 HETEROZYGOUS FACTOR V LEIDEN MUTATION: ICD-10-CM

## 2023-10-16 DIAGNOSIS — Z98.890 H/O RIGHT KNEE SURGERY: ICD-10-CM

## 2023-10-16 LAB
BASOPHILS # BLD AUTO: 0.06 10*3/MM3 (ref 0–0.2)
BASOPHILS NFR BLD AUTO: 0.6 % (ref 0–1.5)
DEPRECATED RDW RBC AUTO: 43.5 FL (ref 37–54)
EOSINOPHIL # BLD AUTO: 0.29 10*3/MM3 (ref 0–0.4)
EOSINOPHIL NFR BLD AUTO: 3 % (ref 0.3–6.2)
ERYTHROCYTE [DISTWIDTH] IN BLOOD BY AUTOMATED COUNT: 14 % (ref 12.3–15.4)
HCT VFR BLD AUTO: 35.5 % (ref 34–46.6)
HGB BLD-MCNC: 11.6 G/DL (ref 12–15.9)
IMM GRANULOCYTES # BLD AUTO: 0.35 10*3/MM3 (ref 0–0.05)
IMM GRANULOCYTES NFR BLD AUTO: 3.6 % (ref 0–0.5)
LYMPHOCYTES # BLD AUTO: 2.23 10*3/MM3 (ref 0.7–3.1)
LYMPHOCYTES NFR BLD AUTO: 22.7 % (ref 19.6–45.3)
MCH RBC QN AUTO: 27.9 PG (ref 26.6–33)
MCHC RBC AUTO-ENTMCNC: 32.7 G/DL (ref 31.5–35.7)
MCV RBC AUTO: 85.3 FL (ref 79–97)
MONOCYTES # BLD AUTO: 0.75 10*3/MM3 (ref 0.1–0.9)
MONOCYTES NFR BLD AUTO: 7.6 % (ref 5–12)
NEUTROPHILS NFR BLD AUTO: 6.13 10*3/MM3 (ref 1.7–7)
NEUTROPHILS NFR BLD AUTO: 62.5 % (ref 42.7–76)
NRBC BLD AUTO-RTO: 0.8 /100 WBC (ref 0–0.2)
PLATELET # BLD AUTO: 174 10*3/MM3 (ref 140–450)
PMV BLD AUTO: 11.8 FL (ref 6–12)
RBC # BLD AUTO: 4.16 10*6/MM3 (ref 3.77–5.28)
WBC NRBC COR # BLD: 9.81 10*3/MM3 (ref 3.4–10.8)

## 2023-10-16 PROCEDURE — 36415 COLL VENOUS BLD VENIPUNCTURE: CPT

## 2023-10-16 PROCEDURE — 85025 COMPLETE CBC W/AUTO DIFF WBC: CPT

## 2023-10-16 NOTE — PROGRESS NOTES
.     REASON FOR FOLLOWUP: Perioperative management of anticoagulation due to heterozygous factor V Leiden mutation.        HISTORY OF PRESENT ILLNESS:  The patient is a 37 y.o. year old female who is here on 10/16/2023 for postop evaluation.    She reports she had surgery on 09/15/2023 for the right knee partial replacement. Patient has been taking Eliquis 2.5 mg twice a day for prophylaxis and has been tolerating well. She finished last dose of Eliquis yesterday. Patient reports because of the knee pain, she took ibuprofen today after finished the Eliquis, and she feels a lot better with her pain. She continues to have physical therapy and also uses a walker to mobilize. Patient denies chest pain, dyspnea. Her right knee edema has much improved. She has no dyspnea, chest pain, no right or lower leg edema.     Patient reports that she actually is planning to have left knee replacement on 12/15/2023.    Laboratory studies today on 10/16/2023 reports mild anemia with hemoglobin 11.6, MCV 85.3, MCHC 32.7. She has normal platelets 174,000, WBC 90,10 including neutrophils 6,130, lymphocytes 2,230, and immature lymphocytes 350.    Preop labs on 09/05/2023 reported mild anemia with hemoglobin 11.4 and MCV 89.3, platelets 354,000, WBC 9,80.    Upon questioning, patient reports she actually donated blood not too long ago before the preop labs. She also reports that she donates blood about every 2 months. She actually took oral iron pills for about a week before her blood donation.     Patient reports she also has been taking multivitamin contains iron for the past several weeks.    Upon review her medical records, she did have evidence of iron deficiency, with lab study on 03/06/2015 reporting normal hemoglobin 12.2, MCV 84.7, MCHC 31.1, and ferritin 17.1 ng/mL, free iron 65, TIBC 426, and iron saturation 15%, B12 level 582, and folate 16.8.    In 02/22/2019 also showed evidence for folate deficiency with a folate level  "4.41 ng/mL, B12 was 727, and iron saturation 9% with free iron of 36, TIBC 380, however, no ferritin level. Her hemoglobin was 11.2 and MCV 88.1, MCHC 29.7.          Past Medical History:   Diagnosis Date    Ankle sprain     Anxiety     Arthritis     Depression     Factor 5 Leiden mutation, heterozygous     GERD (gastroesophageal reflux disease)     History of medical problems     Factor V Leiden    History of torn meniscus of knee     S/P surgery    Myopia 02/19/2015    Obesity     Posterior subcapsular polar senile cataract of both eyes 06/06/2022     Past Surgical History:   Procedure Laterality Date    ANKLE OPEN REDUCTION INTERNAL FIXATION  Jan. 2001, Jan 2016    Set broken tib-fib, remove plate from fib    FRACTURE SURGERY  01/08/2001    Plate removed 01/15/2016    LEG SURGERY  01/2016    plate removed    WISDOM TOOTH EXTRACTION  2009     HEMATOLOGY HISTORY: Sena Min is a 37-year-old  female presented 4/14/2023 for initial evaluation because of heterozygous factor V Leiden mutation.     Patient reports she was tested and confirmed for factor V Leiden mutation about 15 years ago by her OB/GYN physician.  However she was never seen a hematologist.      Recently she also had a genetic study from the \"[employer]\" company and also confirmed heterozygous factor V Leiden mutation.    Patient reports that she herself has never had any personal history of thrombosis. She does have a strong family history of factor V Leiden mutation involving multiple family members on the paternal side. Her paternal grandfather had DVT and was found to have factor V Leiden mutation.  Her sister also had factor V Leiden mutation.  Patient also reports multiple relatives on her father's side having factor V Leiden mutation including aunt and cousins.     Patient reports she was never pregnant. She does not smoke at all. She is a very rare social drinker.     Patient reports she has significant arthralgia in her knees, " mostly on the right knee. She is seeking orthopedic evaluation and possible knee replacement. She previously had arthroscopic right knee surgery for a torn meniscus. She apparently did well with that without blood clots.     Patient is not having other complaints.    Patient is not on any anticoagulation medicine, nor aspirin. She currently is only taking Protonix and diclofenac.           MEDICATIONS    Current Outpatient Medications:     apixaban (ELIQUIS) 2.5 MG tablet tablet, Take 1 tablet by mouth 2 (Two) Times a Day. Post surgery for 30 days, Disp: 60 tablet, Rfl: 0    pantoprazole (PROTONIX) 40 MG EC tablet, TAKE 1 TABLET BY MOUTH DAILY, Disp: 90 tablet, Rfl: 0    ALLERGIES:     Allergies   Allergen Reactions    Nickel Rash       SOCIAL HISTORY:       Social History     Socioeconomic History    Marital status: Single   Tobacco Use    Smoking status: Never    Smokeless tobacco: Never   Vaping Use    Vaping Use: Never used   Substance and Sexual Activity    Alcohol use: Never    Drug use: Never    Sexual activity: Never   Working in construction, desk job.         FAMILY HISTORY:  Family History   Problem Relation Age of Onset    Breast cancer Mother     Hypertension Mother     Arthritis Mother         Hip replacement at 61    Cancer Mother         Breast cancer age 38    Depression Mother     Heart disease Mother         AFIB    Broken bones Mother     Hypertension Father     Factor V Leiden deficiency Father     Clotting disorder Father         Factor five    Other Father         Factor V    Factor V Leiden deficiency Sister     Clotting disorder Sister         Factor five    Other Sister         Factor V    Other Brother         Crohn's    Factor V Leiden deficiency Paternal Aunt     Breast cancer Maternal Grandmother     Cancer Maternal Grandmother         Breast cancer age 50    Heart disease Maternal Grandmother         Heart valve repair    Mental illness Maternal Grandmother         Dementia    Diabetes  "Maternal Grandfather     Arthritis Maternal Grandfather     Heart disease Maternal Grandfather         Heart failure, Heart attack and valve replacement    Cancer Maternal Grandfather         Lung cancer    Stroke Maternal Grandfather         Cause of death    Alzheimer's disease Paternal Grandmother     Mental illness Paternal Grandmother         Alzheimers    Factor V Leiden deficiency Paternal Grandfather     Hearing loss Paternal Grandfather         meniers    Heart disease Paternal Grandfather         Heart valve replacement    Clotting disorder Paternal Grandfather         Factor five    Other Paternal Grandfather         Factor V   Mother had breast cancer at age 38, doing well.  MGM breast cancer at age 50, doing well currently age 86 in 2023.   Paternal grandfather age 90, diagnosed lung cancer got radiation, physically active, he has heart attack and DVT with factor V Leiden.       REVIEW OF SYSTEMS:  Review of Systems  See HPI           Vitals:    10/16/23 1438   BP: 132/82   Pulse: 94   Resp: 18   Temp: 98.2 °F (36.8 °C)   TempSrc: Temporal   SpO2: 98%   Weight: 112 kg (245 lb 14.4 oz)   Height: 167.6 cm (65.98\")   PainSc: 0-No pain         10/16/2023     2:42 PM   Current Status   ECOG score 1      PHYSICAL EXAM: 10/16/2023  GENERAL:  Well-developed, well-nourished in no acute distress.  Patient use a walker.  Orientated to time place and the people.  SKIN:  Warm, dry without rashes, purpura or petechiae.  HEENT:  Normocephalic.   LYMPHATICS:  No cervical, supraclavicular adenopathy.  CHEST: Normal respiratory effort.  Lungs clear to auscultation. Good airflow.  CARDIAC:  Regular rate and rhythm without murmurs. Normal S1,S2.  ABDOMEN:  Soft, nontender with no organomegaly or masses.  Bowel sounds normal.  EXTREMITIES: Right knee has long fresh scar, no evidence for infection.  There is mild edema.  No redness.  No lower leg edema.  Left leg has no edema.    RECENT LABS:        WBC   Date Value Ref " Range Status   10/16/2023 9.81 3.40 - 10.80 10*3/mm3 Final   09/05/2023 9.08 3.40 - 10.80 10*3/mm3 Final   04/14/2023 9.03 3.40 - 10.80 10*3/mm3 Final   03/13/2023 12.4 (H) 3.4 - 10.8 x10E3/uL Final   05/11/2022 11.45 (H) 3.40 - 10.80 10*3/mm3 Final   02/04/2021 7.30 3.40 - 10.80 10*3/mm3 Final   02/22/2019 8.53 3.40 - 10.80 10*3/mm3 Final   01/04/2018 8.94 4.50 - 10.70 10*3/mm3 Final   12/28/2017 10.95 (H) 4.50 - 10.70 10*3/mm3 Final   10/26/2016 7.68 4.50 - 10.70 10*3/mm3 Final   06/27/2016 8.40 4.50 - 10.70 10*3/mm3 Final   03/06/2015 7.57 4.50 - 10.70 K/Cumm Final   03/06/2015 6-10 (A) 0 - 5 /hpf Final     Hemoglobin   Date Value Ref Range Status   10/16/2023 11.6 (L) 12.0 - 15.9 g/dL Final   09/05/2023 11.4 (L) 12.0 - 15.9 g/dL Final   04/14/2023 12.2 12.0 - 15.9 g/dL Final   03/13/2023 12.0 11.1 - 15.9 g/dL Final   05/11/2022 12.3 12.0 - 15.9 g/dL Final   02/04/2021 13.1 12.0 - 15.9 g/dL Final   02/22/2019 11.2 (L) 12.0 - 15.9 g/dL Final   01/04/2018 12.1 11.9 - 15.5 g/dL Final   12/28/2017 12.4 11.9 - 15.5 g/dL Final   10/26/2016 11.9 11.9 - 15.5 g/dL Final   06/27/2016 11.9 11.9 - 15.5 g/dL Final   03/06/2015 12.2 11.9 - 15.5 g/dL Final     Platelets   Date Value Ref Range Status   10/16/2023 174 140 - 450 10*3/mm3 Final   09/05/2023 354 140 - 450 10*3/mm3 Final   04/14/2023 208 140 - 450 10*3/mm3 Final   03/13/2023 373 150 - 450 x10E3/uL Final   05/11/2022 341 140 - 450 10*3/mm3 Final   02/04/2021 279 140 - 450 10*3/mm3 Final   02/22/2019 259 140 - 450 10*3/mm3 Final   01/04/2018 301 140 - 500 10*3/mm3 Final   12/28/2017 298 140 - 500 10*3/mm3 Final   10/26/2016 322 140 - 500 10*3/mm3 Final   06/27/2016 320 140 - 500 10*3/mm3 Final   03/06/2015 299 140 - 500 K/Cumm Final       Assessment & Plan     ASSESSMENT:    Right knee status post partial replacement on 09/15/2023.   Patient reports today on 10/16/2023 that she was tolerating prophylactic anticoagulation and finished 1 month's Eliquis yesterday. She  reports improving right knee edema. Today, she started taking ibuprofen for pain and feels a lot better now. (She was not taking ibuprofen when she was taking Eliquis due to increased risk for bleeding. )  The patient is planning to have left knee partial replacement also which is scheduled on 12/15/2023. She was asking me whether she could take ibuprofen when she is on Eliquis. I think if she only takes up to three times a day 400 mg each, that should be safe, however, she still needs to watch out for signs of bleeding.    2.   Hypercoagulable status with heterozygous factor V Leiden mutation.   She herself has no previous episodes of venous thrombosis or arterial thrombosis. She does have a strong family history on her father's side with multiple family members having the same factor V Leiden mutation and DVT/PEs.   Patient reports that she has significant arthralgia involving the knees mostly in the right knee and she is seeking orthopedic evaluation, and a possibly seeking a knee replacement. I discussed with the patient that currently she is not having any problem with thrombosis, so I will not give her any anticoagulation medicine. Nevertheless, if she does have surgery, I recommend the patient be given prophylactic anticoagulation for 1 month. I think something such as Eliquis 2.5 mg twice a day for 30 days would be a perfect strategy post-op for preventing thrombosis. I would like to see her about 1 month after her surgery. Since we do not have the timing of her surgery at this point, I would not make her appointment as of yet. She will call us.   I also recommend if she travels, she needs to get up about every 60 to 90 minutes to walk around, either stop the car or if she is on a plane to get up and walk around it. She voiced understanding. I think it is also reasonable for her to take a baby aspirin 81 mg daily when she goes on long distance travel.  This patient presented today for reevaluation on  10/16/2023 and she has been tolerating well with low-dose Eliquis anticoagulation with no bleeding or bruising. No evidence for pulmonary emboli or DVT.    3.   Mild anemia, with previous evidence of iron deficiency.  Patient reports she has been donating blood regularly about every 2 months. She has been taking multivitamin which contains iron. I offered her to check her iron level today, however, she said she preferred not to. She will continue taking oral vitamin and iron supplement. I advised the patient not to donate the blood before the left knee replacement. Patient voiced understanding. I also on this patient had a previous evidence of folate deficiency in 2019 and that she should also take folic acid, she currently takes a multivitamin, which contains 400 mg of folic acid.      PLAN:   Patient will continue physical therapy to recover from the right knee surgery.  Patient will continue oral multivitamin which contains iron.  Continue ibuprofen as needed for pain.  Patient plans to have left knee surgery on 12/15/2023 and at that time she will ask us to prescribe Eliquis 2.5 mg twice a day for 30 days.  Patient could take ibuprofen 400 mg up to three times a day when she takes prophylactic Eliquis after her left knee surgery.  I will bring patient back to see me in late 01/2024, about a 5-6 weeks after her left knee surgery.    I discussed with the patient about laboratory results and further management plan.  Patient voiced understanding and agreeable.          Katrin Hastings MD PhD       CC:   CLINTON Vega MD, orthopedic surgeon, [place], [place]         Transcribed from ambient dictation for Katrin Hastings MD PhD by Rosa Pereira.  10/16/23   20:34 EDT    Patient or patient representative verbalized consent to the visit recording.    I have personally performed the services described in this document as transcribed by the above individual, and it  is both accurate and complete.      Katrin Hastings MD PhD

## 2023-10-17 ENCOUNTER — TREATMENT (OUTPATIENT)
Dept: PHYSICAL THERAPY | Facility: CLINIC | Age: 38
End: 2023-10-17
Payer: COMMERCIAL

## 2023-10-17 DIAGNOSIS — M25.661 IMPAIRED RANGE OF MOTION OF RIGHT KNEE: ICD-10-CM

## 2023-10-17 DIAGNOSIS — M25.561 ACUTE PAIN OF RIGHT KNEE: ICD-10-CM

## 2023-10-17 DIAGNOSIS — R26.2 DIFFICULTY IN WALKING: ICD-10-CM

## 2023-10-17 DIAGNOSIS — M17.11 OSTEOARTHRITIS OF RIGHT PATELLOFEMORAL JOINT: Primary | ICD-10-CM

## 2023-10-17 PROCEDURE — 97014 ELECTRIC STIMULATION THERAPY: CPT

## 2023-10-17 PROCEDURE — 97110 THERAPEUTIC EXERCISES: CPT

## 2023-10-17 NOTE — PROGRESS NOTES
Physical Therapy  Progress Note/Reassessment  [address] [place], [place] [address], Phone: [phone]    Patient: Sena Min   : 1985  Diagnosis/ICD-10 Code:  Osteoarthritis of right patellofemoral joint [M17.11]  Referring practitioner: Law Ambrosio MD  Date of Initial Visit: Type: THERAPY  Noted: 2023  Today's Date: 10/17/2023  Patient seen for 9 sessions      Subjective:   Visit Diagnosis:    ICD-10-CM ICD-9-CM   1. Osteoarthritis of right patellofemoral joint  M17.11 715.36   2. Acute pain of right knee  M25.561 719.46   3. Difficulty in walking  R26.2 719.7   4. Impaired range of motion of right knee  M25.661 719.56       Sena Min reports: denied pain upon arrival to therapy.  Patient reported she was instructed by MD that she could now take Ibuprofen .  Patient reported everything is feeling better   Subjective Questionnaire: LEFS: no re-assessed this date   Clinical Progress: improved  Home Program Compliance: Yes  Treatment has included: therapeutic exercise, manual therapy, therapeutic activity, electrical stimulation, and cryotherapy    Subjective   Pain  Current pain ratin  At best pain ratin  At worst pain ratin (91/0 at initial evaluation )     Objective     Assessment/Plan  - Patient has met 3 /4 STGs and is progressing well toward remaining goals.   - Pt is now 50% weight bearing with Rwx and using crutches for stair negotiation without issue  - Recommend continued therapy to address remaining deficits to advance strengthening, weight bearing and mobility as allowed per surgeon's guidelines.   Progress toward previous goals: Partially Met      Goals  Plan Goals: STG: to be met within 10 visits   1. Pt to be (I) with initial HEP for self management of impairments: MET, pt is compliant with current HEP  2. Pt will be mod (I) with sit <> stand, SPS and supine <> sit transfers : MET, pt is mod (I) with all transfers  3. Pt will demonstrate  (R) knee AROM : 0-5-110 deg for improved ability to perform transitions and transfers.   4. Pt will report highest level of pain as 4/10 for improved tolerance to activity and improved restorative sleep pattern : MET, pt reported highest level of pain as 1/10        LTG: to be met by DC - 20 visits  1. Pt to be (I) with finalized HEP  2. Pt to report improved function per LEFS to greater than 80% for improved function and in preparation for (L) knee surgery in 2023  3. Pt will demonstrate (R) knee flex/ext and (R) hip flex/abd/ext = 5/5 for improved function  4. Pt will have ability to ambulate 15 minutes or greater without AD for community mobility and ability to return to work on-site.  Short-term goals (STG):  2/4        Recommendations: Continue as planned - 20 visits recommended  Timeframe:  5 weeks  Prognosis to achieve goals: good    Timed:         Manual Therapy:    0     mins  73262;     Therapeutic Exercise:    30     mins  99257;     Neuromuscular Ramana:    0    mins  95654;    Therapeutic Activity:     0     mins  36116;     Gait Trainin     mins  90900;     Ultrasound:     0     mins  27703;    Ionto                               0    mins   20461  Self Care                       0     mins   91906  Aquatic                          0     mins 83815      Un-Timed:  Electrical Stimulation:    15     mins  49596 ( );  Dry Needling     0     mins self-pay  Traction     0     mins 62874  Low Eval     0     Mins  79144  Mod Eval     0     Mins  44249  High Eval                       0     Mins  57675  Re-Eval                           0    mins  98036      Timed Treatment:   30   mins   Total Treatment:     45   mins      PT Signature: Taya Gonsalez PT  [place] License #: [id number]

## 2023-10-19 ENCOUNTER — TREATMENT (OUTPATIENT)
Dept: PHYSICAL THERAPY | Facility: CLINIC | Age: 38
End: 2023-10-19
Payer: COMMERCIAL

## 2023-10-19 DIAGNOSIS — M25.661 IMPAIRED RANGE OF MOTION OF RIGHT KNEE: ICD-10-CM

## 2023-10-19 DIAGNOSIS — R26.2 DIFFICULTY IN WALKING: ICD-10-CM

## 2023-10-19 DIAGNOSIS — M17.11 OSTEOARTHRITIS OF RIGHT PATELLOFEMORAL JOINT: Primary | ICD-10-CM

## 2023-10-19 DIAGNOSIS — M25.561 ACUTE PAIN OF RIGHT KNEE: ICD-10-CM

## 2023-10-19 DIAGNOSIS — Z47.89 ORTHOPEDIC AFTERCARE: ICD-10-CM

## 2023-10-19 NOTE — PROGRESS NOTES
Physical Therapy Treatment  Note  [address] [facility] [place]   [address]     Diagnosis Plan   1. Osteoarthritis of right patellofemoral joint        2. Acute pain of right knee        3. Impaired range of motion of right knee        4. Orthopedic aftercare        5. Difficulty in walking            VISIT#: 10    Subjective   Sena Min reports: no pain upon arrival to therapy.  Patient reported she has a f/u with ortho in [place] next Tuesday      Objective     See Exercise, Manual, and Modality Logs for complete treatment.     Patient Education:    Goals- Recent progress note completed on 10/17/23  Plan Goals: STG: to be met within 10 visits   1. Pt to be (I) with initial HEP for self management of impairments: MET, pt is compliant with current HEP  2. Pt will be mod (I) with sit <> stand, SPS and supine <> sit transfers : MET, pt is mod (I) with all transfers  3. Pt will demonstrate (R) knee AROM : 0-5-110 deg for improved ability to perform transitions and transfers.   4. Pt will report highest level of pain as 4/10 for improved tolerance to activity and improved restorative sleep pattern : MET, pt reported highest level of pain as 1/10        LTG: to be met by DC - 20 visits  1. Pt to be (I) with finalized HEP  2. Pt to report improved function per LEFS to greater than 80% for improved function and in preparation for (L) knee surgery in 12/2023  3. Pt will demonstrate (R) knee flex/ext and (R) hip flex/abd/ext = 5/5 for improved function  4. Pt will have ability to ambulate 15 minutes or greater without AD for community mobility and ability to return to work on-site.  Short-term goals (STG):  2/4    Assessment/Plan    - Added scar massage this date, avoiding scabbed areas.  Pt tolerated well without c/o increased pain.  Hypomobile/adhesion areas palpated along incision.   - IFC and ice utilized at end of session for pain modulation and control of inflammation.     Progress per Plan of  Care            Timed:         Manual Therapy:    10     mins  93775;     Therapeutic Exercise:    30     mins  89571;     Neuromuscular Ramana:    0    mins  20189;    Therapeutic Activity:     0     mins  57939;     Gait Trainin     mins  56034;     Ultrasound:     0     mins  96348;    Ionto                               0    mins   83725  Self Care                       0     mins   04881  Canalith Repos               0    mins  4209  Aquatic                          0     mins 10784    Un-Timed:  Electrical Stimulation:    15     mins  12113 ( );  Dry Needling     0     mins self-pay  Traction     0     mins 13391  Low Eval     0     Mins  74273  Mod Eval     0     Mins  72230  High Eval                       0     Mins  51226  Re-Eval                           0    mins  81861    Timed Treatment:   40   mins   Total Treatment:     55   mins    Taya Gonsalez, PT PT, DPT, [id number]

## 2023-10-23 ENCOUNTER — TREATMENT (OUTPATIENT)
Dept: PHYSICAL THERAPY | Facility: CLINIC | Age: 38
End: 2023-10-23
Payer: COMMERCIAL

## 2023-10-23 DIAGNOSIS — M25.661 IMPAIRED RANGE OF MOTION OF RIGHT KNEE: ICD-10-CM

## 2023-10-23 DIAGNOSIS — M17.11 OSTEOARTHRITIS OF RIGHT PATELLOFEMORAL JOINT: Primary | ICD-10-CM

## 2023-10-23 DIAGNOSIS — M25.561 ACUTE PAIN OF RIGHT KNEE: ICD-10-CM

## 2023-10-23 DIAGNOSIS — R26.2 DIFFICULTY IN WALKING: ICD-10-CM

## 2023-10-23 DIAGNOSIS — Z47.89 ORTHOPEDIC AFTERCARE: ICD-10-CM

## 2023-10-23 PROCEDURE — 97110 THERAPEUTIC EXERCISES: CPT

## 2023-10-23 PROCEDURE — 97014 ELECTRIC STIMULATION THERAPY: CPT

## 2023-10-23 NOTE — PROGRESS NOTES
Physical Therapy Treatment  Note  [address] [facility] [place]   [address]     Diagnosis Plan   1. Osteoarthritis of right patellofemoral joint        2. Acute pain of right knee        3. Impaired range of motion of right knee        4. Orthopedic aftercare        5. Difficulty in walking            VISIT#: 11    Surgery Date: 9/15/23    Subjective   Sena Min reports: no pain upon arrival to therapy.  Patient reported walking with walker is going well.  Patient reported she has trouble falling asleep but stated she believes it is related to being relatively inactive since surgery.  Patient stated she is able to stay asleep once she falls asleep.     Objective     See Exercise, Manual, and Modality Logs for complete treatment.   (R) knee AROM : 0-6-118 deg    Patient Education:    Goals- Recent progress note completed on 10/17/23  Plan Goals: STG: to be met within 10 visits   1. Pt to be (I) with initial HEP for self management of impairments: MET, pt is compliant with current HEP  2. Pt will be mod (I) with sit <> stand, SPS and supine <> sit transfers : MET, pt is mod (I) with all transfers  3. Pt will demonstrate (R) knee AROM : 0-5-110 deg for improved ability to perform transitions and transfers.   4. Pt will report highest level of pain as 4/10 for improved tolerance to activity and improved restorative sleep pattern : MET, pt reported highest level of pain as 1/10        LTG: to be met by DC - 20 visits  1. Pt to be (I) with finalized HEP  2. Pt to report improved function per LEFS to greater than 80% for improved function and in preparation for (L) knee surgery in 12/2023  3. Pt will demonstrate (R) knee flex/ext and (R) hip flex/abd/ext = 5/5 for improved function  4. Pt will have ability to ambulate 15 minutes or greater without AD for community mobility and ability to return to work on-site.  Short-term goals (STG):  2/4    Assessment/Plan  - Added Recumb Bike for ROM and endurance this  date.  No resistance.  Pt was able to complete full revolutions immediately.  - Improved knee AROM noted via goniometric measurement this date  - Pt remains 50% WB at (R) LE at this time.  F/u appt with ortho surgeon in [place] tomorrow.  - IFC and ice utilized at end of session for pain modulation and control of inflammation.     Progress per Plan of Care            Timed:         Manual Therapy:    0     mins  60690;     Therapeutic Exercise:    30     mins  04180;     Neuromuscular Ramana:    0    mins  79391;    Therapeutic Activity:     0     mins  76151;     Gait Trainin     mins  82213;     Ultrasound:     0     mins  77824;    Ionto                               0    mins   63111  Self Care                       0     mins   32176  Canalith Repos               0    mins  4209  Aquatic                          0     mins 83016    Un-Timed:  Electrical Stimulation:    15     mins  79868 ( );  Dry Needling     0     mins self-pay  Traction     0     mins 04992  Low Eval     0     Mins  28976  Mod Eval     0     Mins  65585  High Eval                       0     Mins  73708  Re-Eval                           0    mins  44345    Timed Treatment:   30   mins   Total Treatment:     45   mins    Taya Gonsalez, PT PT, DPT, [id number]

## 2023-10-23 NOTE — LETTER
Progress Note  10/23/2023  Law Ambrosio MD    Re: Sena CAMPBELL Stevennatalya  ________________________________________________________________    Physical Therapy Treatment  Note  [address] [place]   [address]     Diagnosis Plan   1. Osteoarthritis of right patellofemoral joint        2. Acute pain of right knee        3. Impaired range of motion of right knee        4. Orthopedic aftercare        5. Difficulty in walking            VISIT#: 11    Surgery Date: 9/15/23    Subjective   Sena Harrisalicemealf reports: no pain upon arrival to therapy.  Patient reported walking with walker is going well.  Patient reported she has trouble falling asleep but stated she believes it is related to being relatively inactive since surgery.  Patient stated she is able to stay asleep once she falls asleep.     Objective     See Exercise, Manual, and Modality Logs for complete treatment.   (R) knee AROM : 0-6-118 deg    Patient Education:    Goals- Recent progress note completed on 10/17/23  Plan Goals: STG: to be met within 10 visits   1. Pt to be (I) with initial HEP for self management of impairments: MET, pt is compliant with current HEP  2. Pt will be mod (I) with sit <> stand, SPS and supine <> sit transfers : MET, pt is mod (I) with all transfers  3. Pt will demonstrate (R) knee AROM : 0-5-110 deg for improved ability to perform transitions and transfers.   4. Pt will report highest level of pain as 4/10 for improved tolerance to activity and improved restorative sleep pattern : MET, pt reported highest level of pain as 1/10        LTG: to be met by DC - 20 visits  1. Pt to be (I) with finalized HEP  2. Pt to report improved function per LEFS to greater than 80% for improved function and in preparation for (L) knee surgery in 12/2023  3. Pt will demonstrate (R) knee flex/ext and (R) hip flex/abd/ext = 5/5 for improved function  4. Pt will have ability to ambulate 15 minutes or greater without AD for community  mobility and ability to return to work on-site.  Short-term goals (STG):  2/4    Assessment/Plan  - Added Recumb Bike for ROM and endurance this date.  No resistance.  Pt was able to complete full revolutions immediately.  - Improved knee AROM noted via goniometric measurement this date  - Pt remains 50% WB at (R) LE at this time.  F/u appt with ortho surgeon in [place] tomorrow.  - IFC and ice utilized at end of session for pain modulation and control of inflammation.     Progress per Plan of Care            Timed:         Manual Therapy:    0     mins  66104;     Therapeutic Exercise:    30     mins  16470;     Neuromuscular Ramana:    0    mins  68075;    Therapeutic Activity:     0     mins  47529;     Gait Trainin     mins  22420;     Ultrasound:     0     mins  32252;    Ionto                               0    mins   74971  Self Care                       0     mins   35503  Canalith Repos               0    mins  4209  Aquatic                          0     mins 13517    Un-Timed:  Electrical Stimulation:    15     mins  38505 (MC );  Dry Needling     0     mins self-pay  Traction     0     mins 87120  Low Eval     0     Mins  34413  Mod Eval     0     Mins  81330  High Eval                       0     Mins  79301  Re-Eval                           0    mins  54912    Timed Treatment:   30   mins   Total Treatment:     45   mins    Taya Gonsalez, PT PT, DPT, [id number]         P: Please advise after your exam.    Thank you for this referral.

## 2023-10-26 ENCOUNTER — TREATMENT (OUTPATIENT)
Dept: PHYSICAL THERAPY | Facility: CLINIC | Age: 38
End: 2023-10-26
Payer: COMMERCIAL

## 2023-10-26 DIAGNOSIS — R26.2 DIFFICULTY IN WALKING: ICD-10-CM

## 2023-10-26 DIAGNOSIS — Z47.89 ORTHOPEDIC AFTERCARE: ICD-10-CM

## 2023-10-26 DIAGNOSIS — M25.561 ACUTE PAIN OF RIGHT KNEE: ICD-10-CM

## 2023-10-26 DIAGNOSIS — M17.11 OSTEOARTHRITIS OF RIGHT PATELLOFEMORAL JOINT: Primary | ICD-10-CM

## 2023-10-26 DIAGNOSIS — M25.661 IMPAIRED RANGE OF MOTION OF RIGHT KNEE: ICD-10-CM

## 2023-10-26 NOTE — PROGRESS NOTES
Physical Therapy Treatment  Note  [address] [facility] [place]   [address]     Diagnosis Plan   1. Osteoarthritis of right patellofemoral joint        2. Impaired range of motion of right knee        3. Acute pain of right knee        4. Orthopedic aftercare        5. Difficulty in walking            VISIT#: 12    Surgery Date: 9/15/23    Subjective   Sena Min reports: no pain upon arrival to therapy.  Patient reported she followed up with orthopedic surgeon in [place] this week.  Per patient she is now WBAT and has been ambulating throughout her house without assistive device.  Pt reported she laid down after her first day of WBAT and did not experience any pain as she was experiencing prior to surgery   Objective     See Exercise, Manual, and Modality Logs for complete treatment.   (R) knee AROM : 0-6-118 deg    Patient Education:    Goals- Recent progress note completed on 10/17/23  Plan Goals: STG: to be met within 10 visits   1. Pt to be (I) with initial HEP for self management of impairments: MET, pt is compliant with current HEP  2. Pt will be mod (I) with sit <> stand, SPS and supine <> sit transfers : MET, pt is mod (I) with all transfers  3. Pt will demonstrate (R) knee AROM : 0-5-110 deg for improved ability to perform transitions and transfers.   4. Pt will report highest level of pain as 4/10 for improved tolerance to activity and improved restorative sleep pattern : MET, pt reported highest level of pain as 1/10        LTG: to be met by DC - 20 visits  1. Pt to be (I) with finalized HEP  2. Pt to report improved function per LEFS to greater than 80% for improved function and in preparation for (L) knee surgery in 12/2023  3. Pt will demonstrate (R) knee flex/ext and (R) hip flex/abd/ext = 5/5 for improved function  4. Pt will have ability to ambulate 15 minutes or greater without AD for community mobility and ability to return to work on-site.  Short-term goals (STG):   2/4    Assessment/Plan  - Pt is able to ambulate short distances without AD throughout therapy session.  Cueing required for improved gait mechanics.  Antalgic (R) LE with decreased arm swing observed  - Added standing exercise as pt is now WBAT.  Patient reported significant muscular fatigue at conclusion of exercise  - IFC and ice utilized at end of session for pain modulation and control of inflammation.     Progress per Plan of Care            Timed:         Manual Therapy:    0     mins  98256;     Therapeutic Exercise:    30     mins  17388;     Neuromuscular Ramana:    0    mins  40503;    Therapeutic Activity:     0     mins  83071;     Gait Trainin     mins  33784;     Ultrasound:     0     mins  92100;    Ionto                               0    mins   35119  Self Care                       0     mins   08064  Canalith Repos               0    mins  4209  Aquatic                          0     mins 59452    Un-Timed:  Electrical Stimulation:    15     mins  51620 ( );  Dry Needling     0     mins self-pay  Traction     0     mins 57180  Low Eval     0     Mins  81837  Mod Eval     0     Mins  18179  High Eval                       0     Mins  57083  Re-Eval                           0    mins  62550    Timed Treatment:   30   mins   Total Treatment:     45   mins    Taya Gonsalez, PT PT, DPT, [id number]

## 2023-10-30 ENCOUNTER — TREATMENT (OUTPATIENT)
Dept: PHYSICAL THERAPY | Facility: CLINIC | Age: 38
End: 2023-10-30
Payer: COMMERCIAL

## 2023-10-30 DIAGNOSIS — M17.11 OSTEOARTHRITIS OF RIGHT PATELLOFEMORAL JOINT: Primary | ICD-10-CM

## 2023-10-30 DIAGNOSIS — Z47.89 ORTHOPEDIC AFTERCARE: ICD-10-CM

## 2023-10-30 DIAGNOSIS — M25.561 ACUTE PAIN OF RIGHT KNEE: ICD-10-CM

## 2023-10-30 DIAGNOSIS — M25.661 IMPAIRED RANGE OF MOTION OF RIGHT KNEE: ICD-10-CM

## 2023-10-30 DIAGNOSIS — R26.2 DIFFICULTY IN WALKING: ICD-10-CM

## 2023-10-30 PROBLEM — Z86.2 HISTORY OF IRON DEFICIENCY ANEMIA: Status: ACTIVE | Noted: 2023-10-30

## 2023-10-30 PROBLEM — Z98.890 H/O RIGHT KNEE SURGERY: Status: ACTIVE | Noted: 2023-10-30

## 2023-10-30 NOTE — PROGRESS NOTES
Physical Therapy Treatment  Note  [address] [facility] [place]   [address]     Diagnosis Plan   1. Osteoarthritis of right patellofemoral joint        2. Impaired range of motion of right knee        3. Acute pain of right knee        4. Orthopedic aftercare        5. Difficulty in walking            VISIT#: 13    Surgery Date: 9/15/23    Subjective   Sena Min reports: pain as 0/10 at rest /in sitting.  Up to 4/10 when initiating walking after prolonged sitting/stationary position.       Objective     See Exercise, Manual, and Modality Logs for complete treatment.   (R) knee AROM : 0-6-118 deg    Patient Education:    Goals- Recent progress note completed on 10/17/23  Plan Goals: STG: to be met within 10 visits   1. Pt to be (I) with initial HEP for self management of impairments: MET, pt is compliant with current HEP  2. Pt will be mod (I) with sit <> stand, SPS and supine <> sit transfers : MET, pt is mod (I) with all transfers  3. Pt will demonstrate (R) knee AROM : 0-5-110 deg for improved ability to perform transitions and transfers.   4. Pt will report highest level of pain as 4/10 for improved tolerance to activity and improved restorative sleep pattern : MET, pt reported highest level of pain as 1/10        LTG: to be met by DC - 20 visits  1. Pt to be (I) with finalized HEP  2. Pt to report improved function per LEFS to greater than 80% for improved function and in preparation for (L) knee surgery in 12/2023  3. Pt will demonstrate (R) knee flex/ext and (R) hip flex/abd/ext = 5/5 for improved function  4. Pt will have ability to ambulate 15 minutes or greater without AD for community mobility and ability to return to work on-site.  Short-term goals (STG):  2/4    Assessment/Plan  - Added standing resisted hip strengthening this date.  Patient reported fatigue at lateral aspect of hip while on stance leg.   - Added standing exercise as pt is now WBAT.  Patient reported significant muscular  fatigue at conclusion of exercise  - IFC and ice utilized at end of session for pain modulation and control of inflammation.     Progress per Plan of Care            Timed:         Manual Therapy:    0     mins  15893;     Therapeutic Exercise:    30     mins  30098;     Neuromuscular Ramana:    0    mins  72530;    Therapeutic Activity:     0     mins  04474;     Gait Trainin     mins  63267;     Ultrasound:     0     mins  42925;    Ionto                               0    mins   87105  Self Care                       0     mins   11313  Canalith Repos               0    mins  4209  Aquatic                          0     mins 46662    Un-Timed:  Electrical Stimulation:    15     mins  90528 ( );  Dry Needling     0     mins self-pay  Traction     0     mins 00858  Low Eval     0     Mins  16726  Mod Eval     0     Mins  34678  High Eval                       0     Mins  06941  Re-Eval                           0    mins  43003    Timed Treatment:   30   mins   Total Treatment:     45   mins    Taya Gonsalez, PT PT, DPT, [id number]

## 2023-11-01 ENCOUNTER — TREATMENT (OUTPATIENT)
Dept: PHYSICAL THERAPY | Facility: CLINIC | Age: 38
End: 2023-11-01
Payer: COMMERCIAL

## 2023-11-01 DIAGNOSIS — M17.11 OSTEOARTHRITIS OF RIGHT PATELLOFEMORAL JOINT: Primary | ICD-10-CM

## 2023-11-01 DIAGNOSIS — M25.661 IMPAIRED RANGE OF MOTION OF RIGHT KNEE: ICD-10-CM

## 2023-11-01 DIAGNOSIS — R26.2 DIFFICULTY IN WALKING: ICD-10-CM

## 2023-11-01 DIAGNOSIS — M25.561 ACUTE PAIN OF RIGHT KNEE: ICD-10-CM

## 2023-11-01 DIAGNOSIS — Z47.89 ORTHOPEDIC AFTERCARE: ICD-10-CM

## 2023-11-01 NOTE — PROGRESS NOTES
"Physical Therapy Treatment  Note  [address] [place]   [address]     Diagnosis Plan   1. Osteoarthritis of right patellofemoral joint        2. Impaired range of motion of right knee        3. Acute pain of right knee        4. Difficulty in walking        5. Orthopedic aftercare            VISIT#: 14    Surgery Date: 9/15/23    Subjective   Sena Min reports: \"doing ok\" today . Patient denied pain upon arrival to therapy.  Pt reported (R) knee pain levels up to 3-4/10 which she believes may be weather related.       Objective     See Exercise, Manual, and Modality Logs for complete treatment.   (R) knee AROM : 0-3-110 deg  (11/1/23)     Patient Education:    Goals- Recent progress note completed on 10/17/23  Plan Goals: STG: to be met within 10 visits   1. Pt to be (I) with initial HEP for self management of impairments: MET, pt is compliant with current HEP  2. Pt will be mod (I) with sit <> stand, SPS and supine <> sit transfers : MET, pt is mod (I) with all transfers  3. Pt will demonstrate (R) knee AROM : 0-5-110 deg for improved ability to perform transitions and transfers. MET, see goniometric measurements above (11/1/23)   4. Pt will report highest level of pain as 4/10 for improved tolerance to activity and improved restorative sleep pattern : MET, pt reported highest level of pain as 1/10        LTG: to be met by DC - 20 visits  1. Pt to be (I) with finalized HEP  2. Pt to report improved function per LEFS to greater than 80% for improved function and in preparation for (L) knee surgery in 12/2023  3. Pt will demonstrate (R) knee flex/ext and (R) hip flex/abd/ext = 5/5 for improved function  4. Pt will have ability to ambulate 15 minutes or greater without AD for community mobility and ability to return to work on-site.  Short-term goals (STG):  2/4    Assessment/Plan  - Knee extension AROM improved.  Inconsistent knee flexion AROM measurement via goniometer .   - Pt is ambulating " without assistive device this date   - IFC and ice utilized at end of session for pain modulation and control of inflammation.     Progress per Plan of Care            Timed:         Manual Therapy:    0     mins  81304;     Therapeutic Exercise:    40     mins  65837;     Neuromuscular Ramana:    0    mins  09572;    Therapeutic Activity:     0     mins  15627;     Gait Trainin     mins  45766;     Ultrasound:     0     mins  07452;    Ionto                               0    mins   56994  Self Care                       0     mins   41296  Canalith Repos               0    mins  4209  Aquatic                          0     mins 19109    Un-Timed:  Electrical Stimulation:    15     mins  09425 ( );  Dry Needling     0     mins self-pay  Traction     0     mins 10455  Low Eval     0     Mins  98314  Mod Eval     0     Mins  69724  High Eval                       0     Mins  11002  Re-Eval                           0    mins  25772    Timed Treatment:   40   mins   Total Treatment:     55   mins    Taya Gonsalez, PT PT, DPT, [id number]

## 2023-11-07 ENCOUNTER — TREATMENT (OUTPATIENT)
Dept: PHYSICAL THERAPY | Facility: CLINIC | Age: 38
End: 2023-11-07
Payer: COMMERCIAL

## 2023-11-07 DIAGNOSIS — M25.561 ACUTE PAIN OF RIGHT KNEE: ICD-10-CM

## 2023-11-07 DIAGNOSIS — Z47.89 ORTHOPEDIC AFTERCARE: ICD-10-CM

## 2023-11-07 DIAGNOSIS — M17.11 OSTEOARTHRITIS OF RIGHT PATELLOFEMORAL JOINT: Primary | ICD-10-CM

## 2023-11-07 DIAGNOSIS — R26.2 DIFFICULTY IN WALKING: ICD-10-CM

## 2023-11-07 DIAGNOSIS — M25.661 IMPAIRED RANGE OF MOTION OF RIGHT KNEE: ICD-10-CM

## 2023-11-07 PROCEDURE — 97110 THERAPEUTIC EXERCISES: CPT

## 2023-11-07 NOTE — PROGRESS NOTES
Physical Therapy Treatment  Note  [address] [place]   [address]     Diagnosis Plan   1. Osteoarthritis of right patellofemoral joint        2. Impaired range of motion of right knee        3. Acute pain of right knee        4. Difficulty in walking        5. Orthopedic aftercare            VISIT#: 15    Surgery Date: 9/15/23    Subjective   Sena Min reports: increased swelling at (R)  knee which she attributes to increased walking at work this date. Pain is minimal     Objective     See Exercise, Manual, and Modality Logs for complete treatment.   (R) knee AROM : 0-3-110 deg  (11/1/23)     Patient Education:    Goals- Recent progress note completed on 10/17/23  Plan Goals: STG: to be met within 10 visits   1. Pt to be (I) with initial HEP for self management of impairments: MET, pt is compliant with current HEP  2. Pt will be mod (I) with sit <> stand, SPS and supine <> sit transfers : MET, pt is mod (I) with all transfers  3. Pt will demonstrate (R) knee AROM : 0-5-110 deg for improved ability to perform transitions and transfers. MET, see goniometric measurements above (11/1/23)   4. Pt will report highest level of pain as 4/10 for improved tolerance to activity and improved restorative sleep pattern : MET, pt reported highest level of pain as 1/10        LTG: to be met by DC - 20 visits  1. Pt to be (I) with finalized HEP  2. Pt to report improved function per LEFS to greater than 80% for improved function and in preparation for (L) knee surgery in 12/2023  3. Pt will demonstrate (R) knee flex/ext and (R) hip flex/abd/ext = 5/5 for improved function  4. Pt will have ability to ambulate 15 minutes or greater without AD for community mobility and ability to return to work on-site.    Short-term goals (STG):  2/4    Assessment/Plan  -  Patient tolerated treatment session well without complaint  - Able to advance step ups from 4 inch step to 6 inch step indicating improved functional  strength       Progress per Plan of Care            Timed:         Manual Therapy:    0     mins  08458;     Therapeutic Exercise:    40     mins  80800;     Neuromuscular Ramana:    0    mins  67939;    Therapeutic Activity:     0     mins  94678;     Gait Trainin     mins  16388;     Ultrasound:     0     mins  56706;    Ionto                               0    mins   07598  Self Care                       0     mins   32873  Canalith Repos               0    mins  4209  Aquatic                          0     mins 63092    Un-Timed:  Electrical Stimulation:    0     mins  99385 ( );  Dry Needling     0     mins self-pay  Traction     0     mins 55299  Low Eval     0     Mins  24533  Mod Eval     0     Mins  27814  High Eval                       0     Mins  40368  Re-Eval                           0    mins  53238    Timed Treatment:   40   mins   Total Treatment:     40   mins    Taya Gonsalez, PT PT, DPT, [id number]

## 2023-11-09 ENCOUNTER — TREATMENT (OUTPATIENT)
Dept: PHYSICAL THERAPY | Facility: CLINIC | Age: 38
End: 2023-11-09
Payer: COMMERCIAL

## 2023-11-09 DIAGNOSIS — R26.2 DIFFICULTY IN WALKING: ICD-10-CM

## 2023-11-09 DIAGNOSIS — M25.561 ACUTE PAIN OF RIGHT KNEE: ICD-10-CM

## 2023-11-09 DIAGNOSIS — M25.661 IMPAIRED RANGE OF MOTION OF RIGHT KNEE: ICD-10-CM

## 2023-11-09 DIAGNOSIS — Z47.89 ORTHOPEDIC AFTERCARE: ICD-10-CM

## 2023-11-09 DIAGNOSIS — M17.11 OSTEOARTHRITIS OF RIGHT PATELLOFEMORAL JOINT: Primary | ICD-10-CM

## 2023-11-09 NOTE — PROGRESS NOTES
Physical Therapy Treatment  Note  [address] [facility] [place]   [address]     Diagnosis Plan   1. Osteoarthritis of right patellofemoral joint        2. Impaired range of motion of right knee        3. Acute pain of right knee        4. Difficulty in walking        5. Orthopedic aftercare            VISIT#: [id number]    Surgery Date: 9/15/23    Subjective   Sena Min reports: pain level as 1.5/10 upon arrival to therapy. Patient reported swelling goes down with rest and is good in the morning but increases at (R) knee by end of day     Objective     See Exercise, Manual, and Modality Logs for complete treatment.   (R) knee AROM : 0-3-110 deg  (11/1/23)     Patient Education:    Goals- Recent progress note completed on 10/17/23  Plan Goals: STG: to be met within 10 visits   1. Pt to be (I) with initial HEP for self management of impairments: MET, pt is compliant with current HEP  2. Pt will be mod (I) with sit <> stand, SPS and supine <> sit transfers : MET, pt is mod (I) with all transfers  3. Pt will demonstrate (R) knee AROM : 0-5-110 deg for improved ability to perform transitions and transfers. MET, see goniometric measurements above (11/1/23)   4. Pt will report highest level of pain as 4/10 for improved tolerance to activity and improved restorative sleep pattern : MET, pt reported highest level of pain as 1/10        LTG: to be met by DC - 20 visits  1. Pt to be (I) with finalized HEP  2. Pt to report improved function per LEFS to greater than 80% for improved function and in preparation for (L) knee surgery in 12/2023  3. Pt will demonstrate (R) knee flex/ext and (R) hip flex/abd/ext = 5/5 for improved function  4. Pt will have ability to ambulate 15 minutes or greater without AD for community mobility and ability to return to work on-site.    Short-term goals (STG):  2/4    Assessment/Plan  -  Patient tolerated treatment session well without complaint of increased pain from 3/10   - utilized ice  and IFC/e-stim for pain modulation.  Patient denied contraindications to use of e-stim  - patient was unable to perform alt.  Lunges onto foam with unilateral UE support secondary to imbalance     Progress per Plan of Care            Timed:         Manual Therapy:    0     mins  60454;     Therapeutic Exercise:    33    mins  12169;     Neuromuscular Ramana:    0    mins  67378;    Therapeutic Activity:     0     mins  53407;     Gait Trainin     mins  57537;     Ultrasound:     0     mins  11483;    Ionto                               0    mins   71534  Self Care                       0     mins   99714  Canalith Repos               0    mins  4209  Aquatic                          0     mins 14604    Un-Timed:  Electrical Stimulation:   15     mins  22304 ( );  Dry Needling     0     mins self-pay  Traction     0     mins 75672  Low Eval     0     Mins  50550  Mod Eval     0     Mins  71453  High Eval                       0     Mins  19909  Re-Eval                           0    mins  41634    Timed Treatment:   33  mins   Total Treatment:      48  mins    Taya Gonsalez, PT PT, DPT, [id number]

## 2023-11-13 ENCOUNTER — TREATMENT (OUTPATIENT)
Dept: PHYSICAL THERAPY | Facility: CLINIC | Age: 38
End: 2023-11-13
Payer: COMMERCIAL

## 2023-11-13 DIAGNOSIS — M25.661 IMPAIRED RANGE OF MOTION OF RIGHT KNEE: ICD-10-CM

## 2023-11-13 DIAGNOSIS — M17.11 OSTEOARTHRITIS OF RIGHT PATELLOFEMORAL JOINT: Primary | ICD-10-CM

## 2023-11-13 DIAGNOSIS — Z47.89 ORTHOPEDIC AFTERCARE: ICD-10-CM

## 2023-11-13 DIAGNOSIS — R26.2 DIFFICULTY IN WALKING: ICD-10-CM

## 2023-11-13 DIAGNOSIS — M25.561 ACUTE PAIN OF RIGHT KNEE: ICD-10-CM

## 2023-11-13 PROCEDURE — 97110 THERAPEUTIC EXERCISES: CPT

## 2023-11-13 PROCEDURE — 97014 ELECTRIC STIMULATION THERAPY: CPT

## 2023-11-13 NOTE — PROGRESS NOTES
Physical Therapy Treatment  Note  [address] [place]   [address]     Diagnosis Plan   1. Osteoarthritis of right patellofemoral joint        2. Acute pain of right knee        3. Impaired range of motion of right knee        4. Difficulty in walking        5. Orthopedic aftercare            VISIT#: [id number]    Surgery Date: 9/15/23    Subjective   Sena Min reports: peak pain level as 1/10 at (R) knee. Patient reported (L) knee has been sore.     Objective     See Exercise, Manual, and Modality Logs for complete treatment.   (R) knee AROM : 0-3-110 deg  (11/1/23)     Patient Education:    Goals- Recent progress note completed on 10/17/23  Plan Goals: STG: to be met within 10 visits   1. Pt to be (I) with initial HEP for self management of impairments: MET, pt is compliant with current HEP  2. Pt will be mod (I) with sit <> stand, SPS and supine <> sit transfers : MET, pt is mod (I) with all transfers  3. Pt will demonstrate (R) knee AROM : 0-5-110 deg for improved ability to perform transitions and transfers. MET, see goniometric measurements above (11/1/23)   4. Pt will report highest level of pain as 4/10 for improved tolerance to activity and improved restorative sleep pattern : MET, pt reported highest level of pain as 1/10        LTG: to be met by DC - 20 visits  1. Pt to be (I) with finalized HEP  2. Pt to report improved function per LEFS to greater than 80% for improved function and in preparation for (L) knee surgery in 12/2023  3. Pt will demonstrate (R) knee flex/ext and (R) hip flex/abd/ext = 5/5 for improved function  4. Pt will have ability to ambulate 15 minutes or greater without AD for community mobility and ability to return to work on-site.    Short-term goals (STG):  2/4    Assessment/Plan  -  Added leg press and seated resisted hamstring curls on NK chair this date.  Pt tolerated well   - Utilized ice and IFC/e-stim for pain modulation.  Patient denied contraindications to  use of e-stim       Progress per Plan of Care            Timed:         Manual Therapy:    0     mins  49219;     Therapeutic Exercise:    32    mins  33424;     Neuromuscular Ramana:    0    mins  49611;    Therapeutic Activity:     0     mins  72669;     Gait Trainin     mins  20504;     Ultrasound:     0     mins  82765;    Ionto                               0    mins   72287  Self Care                       0     mins   60048  Canalith Repos               0    mins  4209  Aquatic                          0     mins 85702    Un-Timed:  Electrical Stimulation:   15     mins  72532 ( );  Dry Needling     0     mins self-pay  Traction     0     mins 91991  Low Eval     0     Mins  18411  Mod Eval     0     Mins  12205  High Eval                       0     Mins  53422  Re-Eval                           0    mins  57863    Timed Treatment:   32  mins   Total Treatment:      47  mins    Taya Gonsalez, PT PT, DPT, [id number]

## 2023-11-15 ENCOUNTER — TREATMENT (OUTPATIENT)
Dept: PHYSICAL THERAPY | Facility: CLINIC | Age: 38
End: 2023-11-15
Payer: COMMERCIAL

## 2023-11-15 DIAGNOSIS — M25.661 IMPAIRED RANGE OF MOTION OF RIGHT KNEE: ICD-10-CM

## 2023-11-15 DIAGNOSIS — Z47.89 ORTHOPEDIC AFTERCARE: ICD-10-CM

## 2023-11-15 DIAGNOSIS — R26.2 DIFFICULTY IN WALKING: ICD-10-CM

## 2023-11-15 DIAGNOSIS — M25.561 ACUTE PAIN OF RIGHT KNEE: ICD-10-CM

## 2023-11-15 DIAGNOSIS — M17.11 OSTEOARTHRITIS OF RIGHT PATELLOFEMORAL JOINT: Primary | ICD-10-CM

## 2023-11-15 NOTE — LETTER
Progress Note  2023  Law Ambrosio MD    Re: Sena CAMPBELL Pako  ________________________________________________________________  Physical Therapy  Progress Note/Reassessment  [address] [place], [place] [address], Phone: [phone]    Patient: Sena CAMPBELL Pako   : 1985  Diagnosis/ICD-10 Code:  Osteoarthritis of right patellofemoral joint [M17.11]  Referring practitioner: Law Ambrosio MD  Date of Initial Visit: Type: THERAPY  Noted: 2023  Today's Date: 11/15/2023  Patient seen for 18 sessions      Subjective:   Visit Diagnosis:    ICD-10-CM ICD-9-CM   1. Osteoarthritis of right patellofemoral joint  M17.11 715.36   2. Acute pain of right knee  M25.561 719.46   3. Impaired range of motion of right knee  M25.661 719.56   4. Difficulty in walking  R26.2 719.7   5. Orthopedic aftercare  Z47.89 V54.9       Sena Munroefoster reports: pt denied pain upon arrival to therapy.  Patient reported taking Diclofenac  the past 2 days which has been helping to manage pain and improve function.     Subjective Questionnaire: LEFS:  indicating 442.5 % functional impairment (at initial evaluation LEFS:  )  Clinical Progress: improved  Home Program Compliance: Yes  Treatment has included: therapeutic exercise, neuromuscular re-education, manual therapy, and electrical stimulation; ice     Subjective   Pain  Current pain ratin  At best pain ratin  At worst pain ratin (on Monday )   Location : (R) knee    Objective   Right Knee   Flexion: 5  Extension: 4  Quadriceps contraction: poor    Additional Active Range of Motion Details  (R) knee AROM 0-3-118 deg    Assessment/Plan  - Pt has met all STGs and is progressing toward remaining LTGs  - Pt declined use of ice and e-stim this date as pain was well managed  - Recommend continued OPPT to address remaining deficits to maximize function in preparation for upcoming (L) knee surgery in December    Progress toward previous  goals: Partially Met    Goals  Plan Goals: STG: to be met within 10 visits   1. Pt to be (I) with initial HEP for self management of impairments: MET, pt is compliant with current HEP  2. Pt will be mod (I) with sit <> stand, SPS and supine <> sit transfers : MET, pt is mod (I) with all transfers  3. Pt will demonstrate (R) knee AROM : 0-5-110 deg for improved ability to perform transitions and transfers. MET 0-3-118 deg  4. Pt will report highest level of pain as 4/10 for improved tolerance to activity and improved restorative sleep pattern : MET, pt reported highest level of pain as 1/10        LTG: to be met by DC - 20 visits  1. Pt to be (I) with finalized HEP  2. Pt to report improved function per LEFS to greater than 80% for improved function and in preparation for (L) knee surgery in 2023: PROGRESSING, see scoring above  3. Pt will demonstrate (R) knee flex/ext and (R) hip flex/abd/ext = 5/5 for improved function: PROGRESSING, see objective findings above  4. Pt will have ability to ambulate 15 minutes or greater without AD for community mobility and ability to return to work on-site.: PROGRESSING, pt has returned to work/modified duty.  Is able to walk without AD but is limited in longer distances at this time  Short-term goals (STG):  4/4  Long-term goals (LTG):       Recommendations: Continue as planned - 20 recommended at initial evaluation.   Timeframe: 2 weeks  Prognosis to achieve goals: good    Timed:         Manual Therapy:    0     mins  70709;     Therapeutic Exercise:    45     mins  41690;     Neuromuscular Ramana:    0    mins  20970;    Therapeutic Activity:     0     mins  30242;     Gait Trainin     mins  27980;     Ultrasound:     0     mins  18506;    Ionto                               0    mins   00324  Self Care                       0     mins   82958  Aquatic                          0     mins 22301      Un-Timed:  Electrical Stimulation:    0     mins  20404 (  );  Dry Needling     0     mins self-pay  Traction     0     mins 71550  Low Eval     0     Mins  98981  Mod Eval     0     Mins  24539  High Eval                       0     Mins  71811  Re-Eval                           0    mins  24584      Timed Treatment:   45   mins   Total Treatment:     45   mins      PT Signature: Taya Gonsalez, PT  [place] License #: [id number]     Thank you for this referral.

## 2023-11-15 NOTE — PROGRESS NOTES
Physical Therapy  Progress Note/Reassessment  [address] [place], [place] [address], Phone: [phone]    Patient: Sena Min   : 1985  Diagnosis/ICD-10 Code:  Osteoarthritis of right patellofemoral joint [M17.11]  Referring practitioner: Law Ambrosio MD  Date of Initial Visit: Type: THERAPY  Noted: 2023  Today's Date: 11/15/2023  Patient seen for 18 sessions      Subjective:   Visit Diagnosis:    ICD-10-CM ICD-9-CM   1. Osteoarthritis of right patellofemoral joint  M17.11 715.36   2. Acute pain of right knee  M25.561 719.46   3. Impaired range of motion of right knee  M25.661 719.56   4. Difficulty in walking  R26.2 719.7   5. Orthopedic aftercare  Z47.89 V54.9       Sena Min reports: pt denied pain upon arrival to therapy.  Patient reported taking Diclofenac  the past 2 days which has been helping to manage pain and improve function.     Subjective Questionnaire: LEFS:  indicating 442.5 % functional impairment (at initial evaluation LEFS:  )  Clinical Progress: improved  Home Program Compliance: Yes  Treatment has included: therapeutic exercise, neuromuscular re-education, manual therapy, and electrical stimulation; ice     Subjective   Pain  Current pain ratin  At best pain ratin  At worst pain ratin (on Monday )   Location : (R) knee    Objective   Right Knee   Flexion: 5  Extension: 4  Quadriceps contraction: poor    Additional Active Range of Motion Details  (R) knee AROM 0-3-118 deg    Assessment/Plan  - Pt has met all STGs and is progressing toward remaining LTGs  - Pt declined use of ice and e-stim this date as pain was well managed  - Recommend continued OPPT to address remaining deficits to maximize function in preparation for upcoming (L) knee surgery in December    Progress toward previous goals: Partially Met    Goals  Plan Goals: STG: to be met within 10 visits   1. Pt to be (I) with initial HEP for self management of  impairments: MET, pt is compliant with current HEP  2. Pt will be mod (I) with sit <> stand, SPS and supine <> sit transfers : MET, pt is mod (I) with all transfers  3. Pt will demonstrate (R) knee AROM : 0-5-110 deg for improved ability to perform transitions and transfers. MET 0-3-118 deg  4. Pt will report highest level of pain as 4/10 for improved tolerance to activity and improved restorative sleep pattern : MET, pt reported highest level of pain as 1/10        LTG: to be met by DC - 20 visits  1. Pt to be (I) with finalized HEP  2. Pt to report improved function per LEFS to greater than 80% for improved function and in preparation for (L) knee surgery in 2023: PROGRESSING, see scoring above  3. Pt will demonstrate (R) knee flex/ext and (R) hip flex/abd/ext = 5/5 for improved function: PROGRESSING, see objective findings above  4. Pt will have ability to ambulate 15 minutes or greater without AD for community mobility and ability to return to work on-site.: PROGRESSING, pt has returned to work/modified duty.  Is able to walk without AD but is limited in longer distances at this time  Short-term goals (STG):  4/4  Long-term goals (LTG):       Recommendations: Continue as planned - 20 recommended at initial evaluation.   Timeframe: 2 weeks  Prognosis to achieve goals: good    Timed:         Manual Therapy:    0     mins  49393;     Therapeutic Exercise:    45     mins  86075;     Neuromuscular Ramana:    0    mins  87318;    Therapeutic Activity:     0     mins  18362;     Gait Trainin     mins  55237;     Ultrasound:     0     mins  23044;    Ionto                               0    mins   91018  Self Care                       0     mins   22656  Aquatic                          0     mins 16684      Un-Timed:  Electrical Stimulation:    0     mins  84882 ( );  Dry Needling     0     mins self-pay  Traction     0     mins 39078  Low Eval     0     Mins  43203  Mod Eval     0     Mins   91312  High Eval                       0     Mins  62353  Re-Eval                           0    mins  35549      Timed Treatment:   45   mins   Total Treatment:     45   mins      PT Signature: Taya Gonsalez, PT  [place] License #: [id number]

## 2023-11-16 DIAGNOSIS — M25.562 CHRONIC PAIN OF BOTH KNEES: ICD-10-CM

## 2023-11-16 DIAGNOSIS — G89.29 CHRONIC PAIN OF BOTH KNEES: ICD-10-CM

## 2023-11-16 DIAGNOSIS — M25.561 CHRONIC PAIN OF BOTH KNEES: ICD-10-CM

## 2023-11-16 RX ORDER — DICLOFENAC SODIUM 75 MG/1
75 TABLET, DELAYED RELEASE ORAL 2 TIMES DAILY
Qty: 60 TABLET | Refills: 1 | Status: SHIPPED | OUTPATIENT
Start: 2023-11-16

## 2023-11-20 ENCOUNTER — TREATMENT (OUTPATIENT)
Dept: PHYSICAL THERAPY | Facility: CLINIC | Age: 38
End: 2023-11-20
Payer: COMMERCIAL

## 2023-11-20 DIAGNOSIS — Z47.89 ORTHOPEDIC AFTERCARE: ICD-10-CM

## 2023-11-20 DIAGNOSIS — M25.661 IMPAIRED RANGE OF MOTION OF RIGHT KNEE: ICD-10-CM

## 2023-11-20 DIAGNOSIS — M17.11 OSTEOARTHRITIS OF RIGHT PATELLOFEMORAL JOINT: Primary | ICD-10-CM

## 2023-11-20 DIAGNOSIS — M25.561 ACUTE PAIN OF RIGHT KNEE: ICD-10-CM

## 2023-11-20 DIAGNOSIS — R26.2 DIFFICULTY IN WALKING: ICD-10-CM

## 2023-11-20 NOTE — PROGRESS NOTES
Physical Therapy Treatment  Note  [address] [facility] [place]   [address]     Diagnosis Plan   1. Osteoarthritis of right patellofemoral joint        2. Acute pain of right knee        3. Impaired range of motion of right knee        4. Difficulty in walking        5. Orthopedic aftercare            VISIT#: 19    Subjective   Sena Min reports: she is feeling great.  Patient stated she is looking forward to having other knee done.  Patient denied pain upon arrival to therapy.       Objective     See Exercise, Manual, and Modality Logs for complete treatment.     Patient Education:    Goals- Progress note completed on 11/15/23  Plan Goals: STG: to be met within 10 visits   1. Pt to be (I) with initial HEP for self management of impairments: MET, pt is compliant with current HEP  2. Pt will be mod (I) with sit <> stand, SPS and supine <> sit transfers : MET, pt is mod (I) with all transfers  3. Pt will demonstrate (R) knee AROM : 0-5-110 deg for improved ability to perform transitions and transfers. MET 0-3-118 deg  4. Pt will report highest level of pain as 4/10 for improved tolerance to activity and improved restorative sleep pattern : MET, pt reported highest level of pain as 1/10        LTG: to be met by DC - 20 visits  1. Pt to be (I) with finalized HEP  2. Pt to report improved function per LEFS to greater than 80% for improved function and in preparation for (L) knee surgery in 12/2023: PROGRESSING, see scoring above  3. Pt will demonstrate (R) knee flex/ext and (R) hip flex/abd/ext = 5/5 for improved function: PROGRESSING, see objective findings above  4. Pt will have ability to ambulate 15 minutes or greater without AD for community mobility and ability to return to work on-site.: PROGRESSING, pt has returned to work/modified duty.  Is able to walk without AD but is limited in longer distances at this david    Assessment/Plan  - Improved function as noted by ability to reciprocally negotiate  stairs ascending and descending.   - Increased weight for knee extension and hamstring curls on NK chair.  Patient tolerated well     Progress per Plan of Care            Timed:         Manual Therapy:    0     mins  06919;     Therapeutic Exercise:    32    mins  07469;     Neuromuscular Ramana:    0    mins  38725;    Therapeutic Activity:     8     mins  22739;     Gait Trainin     mins  12267;     Ultrasound:     0     mins  71504;    Ionto                               0    mins   42453  Self Care                       0     mins   46517  Canalith Repos               0    mins  4209  Aquatic                          0     mins 21502    Un-Timed:  Electrical Stimulation:    0     mins  70327 ( );  Dry Needling     0     mins self-pay  Traction     0     mins 38865  Low Eval     0     Mins  60866  Mod Eval     0     Mins  99507  High Eval                       0     Mins  68558  Re-Eval                           0    mins  59600    Timed Treatment:   40   mins   Total Treatment:     40   mins    Taya Gonsalez, PT PT, DPT, [id number]

## 2023-11-22 ENCOUNTER — TREATMENT (OUTPATIENT)
Dept: PHYSICAL THERAPY | Facility: CLINIC | Age: 38
End: 2023-11-22
Payer: COMMERCIAL

## 2023-11-22 DIAGNOSIS — R26.2 DIFFICULTY IN WALKING: ICD-10-CM

## 2023-11-22 DIAGNOSIS — Z47.89 ORTHOPEDIC AFTERCARE: ICD-10-CM

## 2023-11-22 DIAGNOSIS — M25.561 ACUTE PAIN OF RIGHT KNEE: ICD-10-CM

## 2023-11-22 DIAGNOSIS — M25.661 IMPAIRED RANGE OF MOTION OF RIGHT KNEE: ICD-10-CM

## 2023-11-22 DIAGNOSIS — M17.11 OSTEOARTHRITIS OF RIGHT PATELLOFEMORAL JOINT: Primary | ICD-10-CM

## 2023-11-22 PROCEDURE — 97112 NEUROMUSCULAR REEDUCATION: CPT

## 2023-11-22 PROCEDURE — 97110 THERAPEUTIC EXERCISES: CPT

## 2023-11-22 NOTE — PROGRESS NOTES
Physical Therapy Treatment  Note  [address] [facility] [place]   [address]     Diagnosis Plan   1. Osteoarthritis of right patellofemoral joint        2. Acute pain of right knee        3. Impaired range of motion of right knee        4. Difficulty in walking        5. Orthopedic aftercare            VISIT#: 20    Subjective   Sena Min reports: she is feeling great.  Patient stated she is looking forward to having other knee done.  Patient denied pain upon arrival to therapy.       Objective     See Exercise, Manual, and Modality Logs for complete treatment.     Patient Education:    Goals- Progress note completed on 11/15/23  Plan Goals: STG: to be met within 10 visits   1. Pt to be (I) with initial HEP for self management of impairments: MET, pt is compliant with current HEP  2. Pt will be mod (I) with sit <> stand, SPS and supine <> sit transfers : MET, pt is mod (I) with all transfers  3. Pt will demonstrate (R) knee AROM : 0-5-110 deg for improved ability to perform transitions and transfers. MET 0-3-118 deg  4. Pt will report highest level of pain as 4/10 for improved tolerance to activity and improved restorative sleep pattern : MET, pt reported highest level of pain as 1/10        LTG: to be met by DC - 20 visits  1. Pt to be (I) with finalized HEP  2. Pt to report improved function per LEFS to greater than 80% for improved function and in preparation for (L) knee surgery in 12/2023: PROGRESSING, see scoring above  3. Pt will demonstrate (R) knee flex/ext and (R) hip flex/abd/ext = 5/5 for improved function: PROGRESSING, see objective findings above  4. Pt will have ability to ambulate 15 minutes or greater without AD for community mobility and ability to return to work on-site.: PROGRESSING, pt has returned to work/modified duty.  Is able to walk without AD but is limited in longer distances at this david    Assessment/Plan  - Improved function as noted by ability to reciprocally negotiate  stairs ascending and descending.   - Increased weight for knee extension and hamstring curls on NK chair.  Patient tolerated well     Progress per Plan of Care            Timed:         Manual Therapy:    0     mins  67449;     Therapeutic Exercise:    32    mins  28335;     Neuromuscular Ramana:    0    mins  19315;    Therapeutic Activity:     8     mins  65810;     Gait Trainin     mins  90756;     Ultrasound:     0     mins  72870;    Ionto                               0    mins   10153  Self Care                       0     mins   90690  Canalith Repos               0    mins  4209  Aquatic                          0     mins 58898    Un-Timed:  Electrical Stimulation:    0     mins  97022 ( );  Dry Needling     0     mins self-pay  Traction     0     mins 13447  Low Eval     0     Mins  49450  Mod Eval     0     Mins  12463  High Eval                       0     Mins  07717  Re-Eval                           0    mins  04179    Timed Treatment:   40   mins   Total Treatment:     40   mins    Taya Gonsalez, PT PT, DPT, [id number]

## 2023-11-22 NOTE — LETTER
Progress Note  2023  Law Ambrosio MD    Re: Sena CAMPBELL Ludwigfoster  ________________________________________________________________    Re-Assessment / Re-Certification      [address] [place] [address] [place]  [address]    Patient: Sena Min   : 1985  Diagnosis/ICD-10 Code:  Osteoarthritis of right patellofemoral joint [M17.11]  Referring practitioner: Law Ambrosio MD  Date of Initial Visit: Type: THERAPY  Noted: 2023  Today's Date: 2023  Patient seen for 20 sessions      Subjective:      Sena Min reports: she is feeling great.  Patient stated she is looking forward to having other knee done.  Patient denied pain upon arrival to therapy. Minimal pain at (R) knee upon arrival to therapy      Objective     See Exercise, Manual, and Modality Logs for complete treatment.     Patient Education:    Goals- Progress note completed on 11/15/23  Plan Goals: STG: to be met within 10 visits   1. Pt to be (I) with initial HEP for self management of impairments: MET, pt is compliant with current HEP  2. Pt will be mod (I) with sit <> stand, SPS and supine <> sit transfers : MET, pt is mod (I) with all transfers  3. Pt will demonstrate (R) knee AROM : 0-5-110 deg for improved ability to perform transitions and transfers. MET 0-3-118 deg  4. Pt will report highest level of pain as 4/10 for improved tolerance to activity and improved restorative sleep pattern : MET, pt reported highest level of pain as 1/10        LTG: to be met by DC - 20 visits  1. Pt to be (I) with finalized HEP  2. Pt to report improved function per LEFS to greater than 80% for improved function and in preparation for (L) knee surgery in 2023: PROGRESSING, see scoring above  3. Pt will demonstrate (R) knee flex/ext and (R) hip flex/abd/ext = 5/5 for improved function: PROGRESSING, see objective findings above  4. Pt will have ability to ambulate 15 minutes or greater without AD for community mobility and  ability to return to work on-site.: PROGRESSING, pt has returned to work/modified duty.  Is able to walk without AD but is limited in longer distances at this david    Assessment/Plan  - Improved function as noted by ability to reciprocally negotiate stairs ascending and descending.   - Added squats on foam to promote improved balance/proprioception  - recommend adding 2-4 additional sessions to address remaining deficits to maximize function and in preparation for left knee surgery next month.     Recommendations: Continue with recommendations increase POC from 20 visits to 24 visits  Timeframe: 3 weeks  Prognosis to achieve goals: good    PT: Taya Gonsalez PT     [place] License #:  [id number]    Electronically signed by Taya Gonsalez PT, 23, 7:27 AM EST    Certification Period: 2023 thru 24  I certify that the therapy services are furnished while this patient is under my care.  The services outlined above are required by this patient, and will be reviewed every 90 days.         Physician Signature:__________________________________________________    PHYSICIAN: Law Ambrosio MD      DATE:     Please sign and return via fax to .[phone] . Thank you, [facility]      Timed:         Manual Therapy:    0     mins  61012;     Therapeutic Exercise:    32     mins  07773;     Neuromuscular Ramaan:    8    mins  14287;    Therapeutic Activity:     0     mins  29319;     Gait Trainin     mins  70079;     Ultrasound:     0     mins  92869;    Ionto                               0    mins   51967  Self Care                       0     mins   86729  Aquatic                          0     mins 68560      Un-Timed:  Electrical Stimulation:    0     mins  24330 ( );  Dry Needling     0     mins self-pay  Traction     0     mins 10945  Low Eval     0     Mins  03317  Mod Eval     0     Mins  23513  High Eval                       0     Mins  01961  Re-Eval                            0    mins  16861      Timed Treatment:   40   mins   Total Treatment:     40   mins         Thank you for this referral.

## 2023-11-27 ENCOUNTER — TREATMENT (OUTPATIENT)
Dept: PHYSICAL THERAPY | Facility: CLINIC | Age: 38
End: 2023-11-27
Payer: COMMERCIAL

## 2023-11-27 ENCOUNTER — OFFICE VISIT (OUTPATIENT)
Dept: FAMILY MEDICINE CLINIC | Facility: CLINIC | Age: 38
End: 2023-11-27
Payer: COMMERCIAL

## 2023-11-27 VITALS
DIASTOLIC BLOOD PRESSURE: 94 MMHG | OXYGEN SATURATION: 99 % | BODY MASS INDEX: 40.18 KG/M2 | WEIGHT: 250 LBS | SYSTOLIC BLOOD PRESSURE: 135 MMHG | HEIGHT: 66 IN | HEART RATE: 81 BPM | TEMPERATURE: 98.2 F

## 2023-11-27 DIAGNOSIS — M25.561 ACUTE PAIN OF RIGHT KNEE: ICD-10-CM

## 2023-11-27 DIAGNOSIS — M25.661 IMPAIRED RANGE OF MOTION OF RIGHT KNEE: ICD-10-CM

## 2023-11-27 DIAGNOSIS — R26.2 DIFFICULTY IN WALKING: ICD-10-CM

## 2023-11-27 DIAGNOSIS — Z47.89 ORTHOPEDIC AFTERCARE: ICD-10-CM

## 2023-11-27 DIAGNOSIS — M17.11 OSTEOARTHRITIS OF RIGHT PATELLOFEMORAL JOINT: Primary | ICD-10-CM

## 2023-11-27 DIAGNOSIS — Z01.818 PRE-OP EXAM: Primary | ICD-10-CM

## 2023-11-27 PROCEDURE — 97110 THERAPEUTIC EXERCISES: CPT

## 2023-11-27 PROCEDURE — 93000 ELECTROCARDIOGRAM COMPLETE: CPT | Performed by: NURSE PRACTITIONER

## 2023-11-27 PROCEDURE — 97530 THERAPEUTIC ACTIVITIES: CPT

## 2023-11-27 PROCEDURE — 97112 NEUROMUSCULAR REEDUCATION: CPT

## 2023-11-27 PROCEDURE — 99213 OFFICE O/P EST LOW 20 MIN: CPT | Performed by: NURSE PRACTITIONER

## 2023-11-27 NOTE — PROGRESS NOTES
Re-Assessment / Re-Certification      [address] [place] [address] [place]  [address]    Patient: Sena Min   : 1985  Diagnosis/ICD-10 Code:  Osteoarthritis of right patellofemoral joint [M17.11]  Referring practitioner: Law Ambrosio MD  Date of Initial Visit: Type: THERAPY  Noted: 2023  Today's Date: 2023  Patient seen for 20 sessions      Subjective:      Sena Min reports: she is feeling great.  Patient stated she is looking forward to having other knee done.  Patient denied pain upon arrival to therapy. Minimal pain at (R) knee upon arrival to therapy      Objective     See Exercise, Manual, and Modality Logs for complete treatment.     Patient Education:    Goals- Progress note completed on 11/15/23  Plan Goals: STG: to be met within 10 visits   1. Pt to be (I) with initial HEP for self management of impairments: MET, pt is compliant with current HEP  2. Pt will be mod (I) with sit <> stand, SPS and supine <> sit transfers : MET, pt is mod (I) with all transfers  3. Pt will demonstrate (R) knee AROM : 0-5-110 deg for improved ability to perform transitions and transfers. MET 0-3-118 deg  4. Pt will report highest level of pain as 4/10 for improved tolerance to activity and improved restorative sleep pattern : MET, pt reported highest level of pain as 1/10        LTG: to be met by DC - 20 visits  1. Pt to be (I) with finalized HEP  2. Pt to report improved function per LEFS to greater than 80% for improved function and in preparation for (L) knee surgery in 2023: PROGRESSING, see scoring above  3. Pt will demonstrate (R) knee flex/ext and (R) hip flex/abd/ext = 5/5 for improved function: PROGRESSING, see objective findings above  4. Pt will have ability to ambulate 15 minutes or greater without AD for community mobility and ability to return to work on-site.: PROGRESSING, pt has returned to work/modified duty.  Is able to walk without AD but is limited in longer  distances at this david    Assessment/Plan  - Improved function as noted by ability to reciprocally negotiate stairs ascending and descending.   - Added squats on foam to promote improved balance/proprioception  - recommend adding 2-4 additional sessions to address remaining deficits to maximize function and in preparation for left knee surgery next month.     Recommendations: Continue with recommendations increase POC from 20 visits to 24 visits  Timeframe: 3 weeks  Prognosis to achieve goals: good    PT: Taya Gonsalez PT     [place] License #:  [id number]    Electronically signed by Taya Gonsalez PT, 23, 7:27 AM EST    Certification Period: 2023 thru 24  I certify that the therapy services are furnished while this patient is under my care.  The services outlined above are required by this patient, and will be reviewed every 90 days.         Physician Signature:__________________________________________________    PHYSICIAN: Law Ambrosio MD      DATE:     Please sign and return via fax to .[phone] . Thank you, [facility]      Timed:         Manual Therapy:    0     mins  16169;     Therapeutic Exercise:    32     mins  13534;     Neuromuscular Ramana:    8    mins  15421;    Therapeutic Activity:     0     mins  89476;     Gait Trainin     mins  47660;     Ultrasound:     0     mins  05040;    Ionto                               0    mins   51969  Self Care                       0     mins   25180  Aquatic                          0     mins 57555      Un-Timed:  Electrical Stimulation:    0     mins  21769 ( );  Dry Needling     0     mins self-pay  Traction     0     mins 74194  Low Eval     0     Mins  43637  Mod Eval     0     Mins  82817  High Eval                       0     Mins  61068  Re-Eval                           0    mins  61100      Timed Treatment:   40   mins   Total Treatment:     40   mins

## 2023-11-27 NOTE — PROGRESS NOTES
"Chief Complaint  Surgical Clearance (Surgery clearance for L knee replacement )    Subjective        Sena Min presents to [facility]  History of Present Illness patient is here for clearance for left knee replacement.  This time will be a partial replacement which she thinks will be significantly easier than the full knee replacement that she has just undergone and did really well with with excellent recovery.    Patient reports that she walked 1[place]2 miles in the park recently with good tolerance.  She has no health complaints.  Left knee-partial    She has factor V Leiden and was seen by hematology prior to recent right knee replacement and has good understanding of treatment plan, anticoagulation and already has follow-up scheduled after this surgical intervention.    Patient denies chest pain, palpitations, headache, dizziness, abdominal pain or melena.  No signs or symptoms of UTI and stays well-hydrated.  She has no fever chills or other evidence of infection.    A review of systems was performed, and the pertinent positives are noted in the HPI.     Objective   Vital Signs:  /94   Pulse 81   Temp 98.2 °F (36.8 °C)   Ht 167.6 cm (66\")   Wt 113 kg (250 lb)   SpO2 99%   BMI 40.35 kg/m²   Estimated body mass index is 40.35 kg/m² as calculated from the following:    Height as of this encounter: 167.6 cm (66\").    Weight as of this encounter: 113 kg (250 lb).               Physical Exam  Vitals and nursing note reviewed.   Constitutional:       General: She is not in acute distress.     Appearance: She is well-developed. She is not ill-appearing.   HENT:      Head: Normocephalic and atraumatic.      Right Ear: Tympanic membrane, ear canal and external ear normal.      Left Ear: Tympanic membrane, ear canal and external ear normal.      Mouth/Throat:      Mouth: Mucous membranes are moist.      Pharynx: Uvula midline. No posterior oropharyngeal erythema.   Eyes: "      General: No scleral icterus.        Right eye: No discharge.         Left eye: No discharge.      Conjunctiva/sclera: Conjunctivae normal.   Neck:      Thyroid: No thyromegaly.   Cardiovascular:      Rate and Rhythm: Normal rate and regular rhythm.      Heart sounds: Normal heart sounds. No murmur heard.  Pulmonary:      Effort: Pulmonary effort is normal.      Breath sounds: Normal breath sounds.   Abdominal:      General: Bowel sounds are normal.      Palpations: Abdomen is soft.      Tenderness: There is no abdominal tenderness.   Musculoskeletal:         General: No deformity.      Cervical back: Neck supple.   Lymphadenopathy:      Cervical: No cervical adenopathy.   Skin:     General: Skin is warm and dry.   Neurological:      General: No focal deficit present.      Mental Status: She is alert and oriented to person, place, and time.   Psychiatric:         Mood and Affect: Mood normal.         Behavior: Behavior normal.         Thought Content: Thought content normal.        Result Review :              ECG 12 Lead    Date/Time: 11/27/2023 5:15 PM  Performed by: Jennifer Dobson APRN    Authorized by: Jennifer Dobson APRN  Comparison: not compared with previous ECG   Rhythm: sinus rhythm  Rate: normal  Conduction: conduction normal  ST Segments: ST segments normal  T Waves: T waves normal  QRS axis: normal  Other: no other findings    Clinical impression: normal ECG            Assessment and Plan   Diagnoses and all orders for this visit:    1. Pre-op exam (Primary)  -     CBC & Differential; Future  -     Comprehensive Metabolic Panel; Future  -     Hemoglobin A1c; Future  -     Urinalysis With Culture If Indicated -; Future    Other orders  -     ECG 12 Lead    Preop labs ordered.  Requested EKG was done today and showed normal sinus rhythm.  Once I review her labs will give surgical clearance likely.  We discussed treatment plan, recovery and I think she has excellent understanding and motivation  to complete treatment as directed.         Follow Up   No follow-ups on file.  Patient was given instructions and counseling regarding her condition or for health maintenance advice. Please see specific information pulled into the AVS if appropriate.         Answers submitted by the patient for this visit:  Other (Submitted on 11/22/2023)  Please describe your symptoms.: Surgical clearance for left knee patella femural partial knee replacement  Have you had these symptoms before?: No  How long have you been having these symptoms?: Greater than 2 weeks  Primary Reason for Visit (Submitted on 11/22/2023)  What is the primary reason for your visit?: Other

## 2023-11-27 NOTE — PROGRESS NOTES
Physical Therapy Treatment  Note  [address] [facility] [place]   [address]     Diagnosis Plan   1. Osteoarthritis of right patellofemoral joint        2. Acute pain of right knee        3. Impaired range of motion of right knee        4. Difficulty in walking        5. Orthopedic aftercare            VISIT#: [id number]    Subjective   Sena Min reports: no pain at (R) knee.  Patient reported she took a 1.25 mile walk.  Was limited by left knee pain.       Objective     See Exercise, Manual, and Modality Logs for complete treatment.     Patient Education:    Goals- Recent progress note completed on 11/15/23  Plan Goals: STG: to be met within 10 visits   1. Pt to be (I) with initial HEP for self management of impairments: MET, pt is compliant with current HEP  2. Pt will be mod (I) with sit <> stand, SPS and supine <> sit transfers : MET, pt is mod (I) with all transfers  3. Pt will demonstrate (R) knee AROM : 0-5-110 deg for improved ability to perform transitions and transfers. MET 0-3-118 deg  4. Pt will report highest level of pain as 4/10 for improved tolerance to activity and improved restorative sleep pattern : MET, pt reported highest level of pain as 1/10        LTG: to be met by DC - 20 visits  1. Pt to be (I) with finalized HEP  2. Pt to report improved function per LEFS to greater than 80% for improved function and in preparation for (L) knee surgery in 12/2023: PROGRESSING, see scoring above  3. Pt will demonstrate (R) knee flex/ext and (R) hip flex/abd/ext = 5/5 for improved function: PROGRESSING, see objective findings above  4. Pt will have ability to ambulate 15 minutes or greater without AD for community mobility and ability to return to work on-site.: PROGRESSING, pt has returned to work/modified duty.  Is able to walk without AD but is limited in longer distances at this time    Assessment/Plan  - Increased resistance on leg press this date per patient request.  Patient tolerated well  without pain  - Added ambulation on TM this date to promote improved gait mechanics  - patient denied (R) knee pain throughout and at conclusion of therapy session.     Progress per Plan of Care            Timed:         Manual Therapy:    0     mins  49337;     Therapeutic Exercise:    22     mins  02322;     Neuromuscular Ramana:    8    mins  84453;    Therapeutic Activity:     8     mins  52930;     Gait Trainin     mins  47291;     Ultrasound:     0     mins  42604;    Ionto                               0    mins   83656  Self Care                       0     mins   77484  Canalith Repos               0    mins  4209  Aquatic                          0     mins 21291    Un-Timed:  Electrical Stimulation:    0     mins  06782 ( );  Dry Needling     0     mins self-pay  Traction     0     mins 90178  Low Eval     0     Mins  49742  Mod Eval     0     Mins  12120  High Eval                       0     Mins  47791  Re-Eval                           0    mins  14467    Timed Treatment:   38   mins   Total Treatment:     38   mins    Taya Gonsalez, PT PT, DPT, [id number]

## 2023-11-29 ENCOUNTER — TREATMENT (OUTPATIENT)
Dept: PHYSICAL THERAPY | Facility: CLINIC | Age: 38
End: 2023-11-29
Payer: COMMERCIAL

## 2023-11-29 ENCOUNTER — LAB (OUTPATIENT)
Dept: LAB | Facility: HOSPITAL | Age: 38
End: 2023-11-29
Payer: COMMERCIAL

## 2023-11-29 ENCOUNTER — TRANSCRIBE ORDERS (OUTPATIENT)
Dept: PHYSICAL THERAPY | Facility: CLINIC | Age: 38
End: 2023-11-29
Payer: COMMERCIAL

## 2023-11-29 DIAGNOSIS — M17.11 OSTEOARTHRITIS OF RIGHT PATELLOFEMORAL JOINT: Primary | ICD-10-CM

## 2023-11-29 DIAGNOSIS — Z47.89 ORTHOPEDIC AFTERCARE: ICD-10-CM

## 2023-11-29 DIAGNOSIS — M25.561 ACUTE PAIN OF RIGHT KNEE: ICD-10-CM

## 2023-11-29 DIAGNOSIS — R26.2 DIFFICULTY IN WALKING: ICD-10-CM

## 2023-11-29 DIAGNOSIS — M17.12 UNILATERAL PRIMARY OSTEOARTHRITIS, LEFT KNEE: Primary | ICD-10-CM

## 2023-11-29 DIAGNOSIS — M25.661 IMPAIRED RANGE OF MOTION OF RIGHT KNEE: ICD-10-CM

## 2023-11-29 PROCEDURE — 83036 HEMOGLOBIN GLYCOSYLATED A1C: CPT | Performed by: NURSE PRACTITIONER

## 2023-11-29 PROCEDURE — 80053 COMPREHEN METABOLIC PANEL: CPT | Performed by: NURSE PRACTITIONER

## 2023-11-29 PROCEDURE — 85025 COMPLETE CBC W/AUTO DIFF WBC: CPT | Performed by: NURSE PRACTITIONER

## 2023-11-29 NOTE — PROGRESS NOTES
Physical Therapy Treatment  Note  [address] [facility] [place]   [address]     Diagnosis Plan   1. Osteoarthritis of right patellofemoral joint        2. Acute pain of right knee        3. Impaired range of motion of right knee        4. Difficulty in walking        5. Orthopedic aftercare            VISIT#: [id number]    Subjective   Sena Min reports: no pain at (R) knee upon arrival to therapy.     Objective     See Exercise, Manual, and Modality Logs for complete treatment.     Patient Education:    Goals- Recent progress note completed on 11/15/23  Plan Goals: STG: to be met within 10 visits   1. Pt to be (I) with initial HEP for self management of impairments: MET, pt is compliant with current HEP  2. Pt will be mod (I) with sit <> stand, SPS and supine <> sit transfers : MET, pt is mod (I) with all transfers  3. Pt will demonstrate (R) knee AROM : 0-5-110 deg for improved ability to perform transitions and transfers. MET 0-3-118 deg  4. Pt will report highest level of pain as 4/10 for improved tolerance to activity and improved restorative sleep pattern : MET, pt reported highest level of pain as 1/10        LTG: to be met by DC - 20 visits  1. Pt to be (I) with finalized HEP  2. Pt to report improved function per LEFS to greater than 80% for improved function and in preparation for (L) knee surgery in 12/2023: PROGRESSING, see scoring above  3. Pt will demonstrate (R) knee flex/ext and (R) hip flex/abd/ext = 5/5 for improved function: PROGRESSING, see objective findings above  4. Pt will have ability to ambulate 15 minutes or greater without AD for community mobility and ability to return to work on-site.: PROGRESSING, pt has returned to work/modified duty.  Is able to walk without AD but is limited in longer distances at this time    Assessment/Plan  - Increased resistance on leg press this date per patient request.  Patient tolerated well without pain  - Added retro  ambulation on TM this date to  promote improved gait mechanics, strength and coordination   - patient denied (R) knee pain throughout and at conclusion of therapy session.     Progress per Plan of Care            Timed:         Manual Therapy:    0     mins  50724;     Therapeutic Exercise:    40     mins  49271;     Neuromuscular Ramana:    8    mins  62173;    Therapeutic Activity:     10     mins  49026;     Gait Trainin     mins  93821;     Ultrasound:     0     mins  10214;    Ionto                               0    mins   04538  Self Care                       0     mins   77001  Canalith Repos               0    mins  4209  Aquatic                          0     mins 34041    Un-Timed:  Electrical Stimulation:    0     mins  28052 ( );  Dry Needling     0     mins self-pay  Traction     0     mins 08826  Low Eval     0     Mins  06011  Mod Eval     0     Mins  26925  High Eval                       0     Mins  99982  Re-Eval                           0    mins  32869    Timed Treatment:   58   mins   Total Treatment:     58   mins    Taya Gonsalez, PT PT, DPT, [id number]

## 2023-12-01 ENCOUNTER — TELEPHONE (OUTPATIENT)
Dept: FAMILY MEDICINE CLINIC | Facility: CLINIC | Age: 38
End: 2023-12-01
Payer: COMMERCIAL

## 2023-12-01 NOTE — TELEPHONE ENCOUNTER
"Relay     \"Urine culture is back and shows no evidence of pathogens-so no UTI-Cristiano \"        "

## 2023-12-04 ENCOUNTER — TREATMENT (OUTPATIENT)
Dept: PHYSICAL THERAPY | Facility: CLINIC | Age: 38
End: 2023-12-04
Payer: COMMERCIAL

## 2023-12-04 DIAGNOSIS — M25.561 ACUTE PAIN OF RIGHT KNEE: ICD-10-CM

## 2023-12-04 DIAGNOSIS — M25.661 IMPAIRED RANGE OF MOTION OF RIGHT KNEE: ICD-10-CM

## 2023-12-04 DIAGNOSIS — Z47.89 ORTHOPEDIC AFTERCARE: ICD-10-CM

## 2023-12-04 DIAGNOSIS — M17.11 OSTEOARTHRITIS OF RIGHT PATELLOFEMORAL JOINT: Primary | ICD-10-CM

## 2023-12-04 DIAGNOSIS — R26.2 DIFFICULTY IN WALKING: ICD-10-CM

## 2023-12-04 PROCEDURE — 97110 THERAPEUTIC EXERCISES: CPT

## 2023-12-04 PROCEDURE — 97112 NEUROMUSCULAR REEDUCATION: CPT

## 2023-12-04 PROCEDURE — 97530 THERAPEUTIC ACTIVITIES: CPT

## 2023-12-04 NOTE — PROGRESS NOTES
Physical Therapy Treatment  Note  [address] [facility] [place]   [address]     Diagnosis Plan   1. Osteoarthritis of right patellofemoral joint        2. Impaired range of motion of right knee        3. Acute pain of right knee        4. Difficulty in walking        5. Orthopedic aftercare            VISIT#: 23    Subjective   Sena Min reports: no pain at (R) knee upon arrival to therapy.     Objective     See Exercise, Manual, and Modality Logs for complete treatment.     Patient Education:    Goals- Recent progress note completed on 11/15/23  Plan Goals: STG: to be met within 10 visits   1. Pt to be (I) with initial HEP for self management of impairments: MET, pt is compliant with current HEP  2. Pt will be mod (I) with sit <> stand, SPS and supine <> sit transfers : MET, pt is mod (I) with all transfers  3. Pt will demonstrate (R) knee AROM : 0-5-110 deg for improved ability to perform transitions and transfers. MET 0-3-118 deg  4. Pt will report highest level of pain as 4/10 for improved tolerance to activity and improved restorative sleep pattern : MET, pt reported highest level of pain as 1/10        LTG: to be met by DC - 20 visits  1. Pt to be (I) with finalized HEP  2. Pt to report improved function per LEFS to greater than 80% for improved function and in preparation for (L) knee surgery in 12/2023: PROGRESSING, see scoring above  3. Pt will demonstrate (R) knee flex/ext and (R) hip flex/abd/ext = 5/5 for improved function: PROGRESSING, see objective findings above  4. Pt will have ability to ambulate 15 minutes or greater without AD for community mobility and ability to return to work on-site.: PROGRESSING, pt has returned to work/modified duty.  Is able to walk without AD but is limited in longer distances at this time    Assessment/Plan  - No pain at conclusion of treatment.   - Improved functional ROM observed during squats.    Progress per Plan of Care            Timed:         Manual  Therapy:    0     mins  92336;     Therapeutic Exercise:    30     mins  52783;     Neuromuscular Ramana:    8    mins  08336;    Therapeutic Activity:     10     mins  77417;     Gait Trainin     mins  04482;     Ultrasound:     0     mins  61887;    Ionto                               0    mins   99738  Self Care                       0     mins   79278  Canalith Repos               0    mins  4209  Aquatic                          0     mins 99060    Un-Timed:  Electrical Stimulation:    0     mins  67233 ( );  Dry Needling     0     mins self-pay  Traction     0     mins 52135  Low Eval     0     Mins  36984  Mod Eval     0     Mins  41091  High Eval                       0     Mins  56925  Re-Eval                           0    mins  51153    Timed Treatment:   48   mins   Total Treatment:     48   mins    Taya Gonsalez, PT PT, DPT, [id number]

## 2023-12-06 ENCOUNTER — TREATMENT (OUTPATIENT)
Dept: PHYSICAL THERAPY | Facility: CLINIC | Age: 38
End: 2023-12-06
Payer: COMMERCIAL

## 2023-12-06 DIAGNOSIS — M25.661 IMPAIRED RANGE OF MOTION OF RIGHT KNEE: ICD-10-CM

## 2023-12-06 DIAGNOSIS — M17.11 OSTEOARTHRITIS OF RIGHT PATELLOFEMORAL JOINT: Primary | ICD-10-CM

## 2023-12-06 DIAGNOSIS — R26.2 DIFFICULTY IN WALKING: ICD-10-CM

## 2023-12-06 DIAGNOSIS — M25.561 ACUTE PAIN OF RIGHT KNEE: ICD-10-CM

## 2023-12-06 DIAGNOSIS — Z47.89 ORTHOPEDIC AFTERCARE: ICD-10-CM

## 2023-12-06 NOTE — PROGRESS NOTES
"Discharge      [address] [facility] [place]  [address]    Patient: Sena Min   : 1985  Diagnosis/ICD-10 Code:  Osteoarthritis of right patellofemoral joint [M17.11]  Referring practitioner: Law Ambrosio MD  Date of Initial Visit: Type: THERAPY  Noted: 2023  Today's Date: 2023  Patient seen for 24 sessions      Subjective:      Sena Min reports: (R) thigh is feeling \" tight\" but denied pain     Objective     Right Knee   Flexion: 5  Extension: 5  Quadriceps contraction: fair   Right Hip:  Flexion 5/5  Abduction: 5/5     Additional Active Range of Motion Details  (R) knee AROM 0-118 deg    See Exercise, Manual, and Modality Logs for complete treatment.     Patient Education:    Goals- Progress note completed on 11/15/23  Plan Goals: STG: to be met within 10 visits   1. Pt to be (I) with initial HEP for self management of impairments: MET, pt is compliant with current HEP  2. Pt will be mod (I) with sit <> stand, SPS and supine <> sit transfers : MET, pt is mod (I) with all transfers  3. Pt will demonstrate (R) knee AROM : 0-5-110 deg for improved ability to perform transitions and transfers. MET 0-3-118 deg  4. Pt will report highest level of pain as 4/10 for improved tolerance to activity and improved restorative sleep pattern : MET, pt reported highest level of pain as 1/10        LTG: to be met by DC - 20 visits  1. Pt to be (I) with finalized HEP :MET, pt is compliant with HEP   2. Pt to report improved function per LEFS to greater than 80% for improved function and in preparation for (L) knee surgery in 2023: PROGRESSING, see scoring above  3. Pt will demonstrate (R) knee flex/ext and (R) hip flex/abd/ext = 5/5 for improved function: MET, see objective findings  4. Pt will have ability to ambulate 15 minutes or greater without AD for community mobility and ability to return to work on-site.MET, pt is able to ambulate up to 1.25 miles without " AD    Assessment/Plan  - Pt is scheduled for (L) knee surgery on 12/15/23  - Pt has met all but one goal for this episode of therapy  - will continue with HEP until scheduled surgery.   - D/C for (R) knee therapy at this time       Recommendations: Discharge      PT: Taya Gonsalez PT     [place] License #:  [id number]    Electronically signed by Taya Gonsalez PT, 23, 7:27 AM EST    Certification Period: 2023 thru 24  I certify that the therapy services are furnished while this patient is under my care.  The services outlined above are required by this patient, and will be reviewed every 90 days.         Physician Signature:__________________________________________________    PHYSICIAN: Law Ambrosio MD      DATE:     Please sign and return via fax to .[phone] . Thank you, [facility]      Timed:         Manual Therapy:    0     mins  90512;     Therapeutic Exercise:    35     mins  93996;     Neuromuscular Ramana:    8    mins  77754;    Therapeutic Activity:     10     mins  57426;     Gait Trainin     mins  74708;     Ultrasound:     0     mins  41088;    Ionto                               0    mins   06569  Self Care                       0     mins   38673  Aquatic                          0     mins 52961      Un-Timed:  Electrical Stimulation:    0     mins  80282 ( );  Dry Needling     0     mins self-pay  Traction     0     mins 68568  Low Eval     0     Mins  83966  Mod Eval     0     Mins  36047  High Eval                       0     Mins  49937  Re-Eval                           0    mins  56998      Timed Treatment:   53   mins   Total Treatment:     53   mins

## 2023-12-06 NOTE — LETTER
"Progress Note  2023  Law Ambrosoi MD    Re: Sena CAMPBELL Pako  ________________________________________________________________  Discharge      [address] [place]  [address]    Patient: Sena Munroereynatalya   : 1985  Diagnosis/ICD-10 Code:  Osteoarthritis of right patellofemoral joint [M17.11]  Referring practitioner: Law Ambrosio MD  Date of Initial Visit: Type: THERAPY  Noted: 2023  Today's Date: 2023  Patient seen for 24 sessions      Subjective:      Sena Pako reports: (R) thigh is feeling \" tight\" but denied pain     Objective     Right Knee   Flexion: 5  Extension: 5  Quadriceps contraction: fair   Right Hip:  Flexion 5/5  Abduction: 5/5     Additional Active Range of Motion Details  (R) knee AROM 0-118 deg    See Exercise, Manual, and Modality Logs for complete treatment.     Patient Education:    Goals- Progress note completed on 11/15/23  Plan Goals: STG: to be met within 10 visits   1. Pt to be (I) with initial HEP for self management of impairments: MET, pt is compliant with current HEP  2. Pt will be mod (I) with sit <> stand, SPS and supine <> sit transfers : MET, pt is mod (I) with all transfers  3. Pt will demonstrate (R) knee AROM : 0-5-110 deg for improved ability to perform transitions and transfers. MET 0-3-118 deg  4. Pt will report highest level of pain as 4/10 for improved tolerance to activity and improved restorative sleep pattern : MET, pt reported highest level of pain as 1/10        LTG: to be met by DC - 20 visits  1. Pt to be (I) with finalized HEP :MET, pt is compliant with HEP   2. Pt to report improved function per LEFS to greater than 80% for improved function and in preparation for (L) knee surgery in 2023: PROGRESSING, see scoring above  3. Pt will demonstrate (R) knee flex/ext and (R) hip flex/abd/ext = 5/5 for improved function: MET, see objective findings  4. Pt will have ability to ambulate 15 minutes or greater " without AD for community mobility and ability to return to work on-site.MET, pt is able to ambulate up to 1.25 miles without AD    Assessment/Plan  - Pt is scheduled for (L) knee surgery on 12/15/23  - Pt has met all but one goal for this episode of therapy  - will continue with HEP until scheduled surgery.   - D/C for (R) knee therapy at this time       Recommendations: Discharge      PT: Taya Gonsalez PT     [place] License #:  [id number]    Electronically signed by Taya Gonsalez PT, 23, 7:27 AM EST    Certification Period: 2023 thru 24  I certify that the therapy services are furnished while this patient is under my care.  The services outlined above are required by this patient, and will be reviewed every 90 days.         Physician Signature:__________________________________________________    PHYSICIAN: Law Ambrosio MD      DATE:     Please sign and return via fax to .[phone] . Thank you, [facility]      Timed:         Manual Therapy:    0     mins  02774;     Therapeutic Exercise:    35     mins  04030;     Neuromuscular Ramana:    8    mins  28817;    Therapeutic Activity:     10     mins  82777;     Gait Trainin     mins  02082;     Ultrasound:     0     mins  42972;    Ionto                               0    mins   26663  Self Care                       0     mins   40890  Aquatic                          0     mins 16603      Un-Timed:  Electrical Stimulation:    0     mins  00509 ( );  Dry Needling     0     mins self-pay  Traction     0     mins 91782  Low Eval     0     Mins  69779  Mod Eval     0     Mins  94838  High Eval                       0     Mins  56462  Re-Eval                           0    mins  68747      Timed Treatment:   53   mins   Total Treatment:     53   mins       Thank you for this referral.

## 2023-12-06 NOTE — LETTER
"Progress Note  2023  Law Ambrosio MD    Re: Sena CAMPBELL Ericadam  ________________________________________________________________    Discharge      [address] [place]  [address]    Patient: Sena Min   : 1985  Diagnosis/ICD-10 Code:  Osteoarthritis of right patellofemoral joint [M17.11]  Referring practitioner: Law Ambrosio MD  Date of Initial Visit: Type: THERAPY  Noted: 2023  Today's Date: 2023  Patient seen for 24 sessions      Subjective:      Sena Munroefoster reports: (R) thigh is feeling \" tight\" but denied pain     Objective     Right Knee   Flexion: 5  Extension: 4  Quadriceps contraction: poor     Additional Active Range of Motion Details  (R) knee AROM 0-3-118 deg    See Exercise, Manual, and Modality Logs for complete treatment.     Patient Education:    Goals- Progress note completed on 11/15/23  Plan Goals: STG: to be met within 10 visits   1. Pt to be (I) with initial HEP for self management of impairments: MET, pt is compliant with current HEP  2. Pt will be mod (I) with sit <> stand, SPS and supine <> sit transfers : MET, pt is mod (I) with all transfers  3. Pt will demonstrate (R) knee AROM : 0-5-110 deg for improved ability to perform transitions and transfers. MET 0-3-118 deg  4. Pt will report highest level of pain as 4/10 for improved tolerance to activity and improved restorative sleep pattern : MET, pt reported highest level of pain as 1/10        LTG: to be met by DC - 20 visits  1. Pt to be (I) with finalized HEP :MET, pt is compliant with HEP   2. Pt to report improved function per LEFS to greater than 80% for improved function and in preparation for (L) knee surgery in 2023: PROGRESSING, see scoring above  3. Pt will demonstrate (R) knee flex/ext and (R) hip flex/abd/ext = 5/5 for improved function: PROGRESSING, see objective findings above  4. Pt will have ability to ambulate 15 minutes or greater without AD for community " mobility and ability to return to work on-site.: PROGRESSING, pt has returned to work/modified duty.  Is able to walk without AD but is limited in longer distances at this david    Assessment/Plan  - Pt is scheduled for (L) knee surgery on 12/15/23      Recommendations: Discharge      PT: Taya Gonsalez PT     [place] License #:  [id number]    Electronically signed by Taya Gonsalez PT, 23, 7:27 AM EST    Certification Period: 2023 thru 24  I certify that the therapy services are furnished while this patient is under my care.  The services outlined above are required by this patient, and will be reviewed every 90 days.         Physician Signature:__________________________________________________    PHYSICIAN: Law Ambrosio MD      DATE:     Please sign and return via fax to .[phone] . Thank you, [facility]      Timed:         Manual Therapy:    0     mins  65172;     Therapeutic Exercise:    32     mins  86820;     Neuromuscular Ramana:    8    mins  21686;    Therapeutic Activity:     0     mins  74408;     Gait Trainin     mins  08674;     Ultrasound:     0     mins  88724;    Ionto                               0    mins   10446  Self Care                       0     mins   29146  Aquatic                          0     mins 45414      Un-Timed:  Electrical Stimulation:    0     mins  99728 (MC );  Dry Needling     0     mins self-pay  Traction     0     mins 72622  Low Eval     0     Mins  80929  Mod Eval     0     Mins  70151  High Eval                       0     Mins  76143  Re-Eval                           0    mins  00318      Timed Treatment:   40   mins   Total Treatment:     40   mins    Physical Therapy  Progress Note/Reassessment  [address] [place], [place] [address], Phone: [phone]    Patient: Sena Min   : 1985  Diagnosis/ICD-10 Code:  Osteoarthritis of right patellofemoral joint [M17.11]  Referring  practitioner: Law Ambrosio MD  Date of Initial Visit: Type: THERAPY  Noted: 9/18/2023  Today's Date: 12/6/2023  Patient seen for 24 sessions      Subjective:   Visit Diagnosis:    ICD-10-CM ICD-9-CM   1. Osteoarthritis of right patellofemoral joint  M17.11 715.36   2. Impaired range of motion of right knee  M25.661 719.56   3. Acute pain of right knee  M25.561 719.46   4. Difficulty in walking  R26.2 719.7   5. Orthopedic aftercare  Z47.89 V54.9       Sena Pako reports: ***  Subjective Questionnaire: {PT subjective questionnaires:63058}  Clinical Progress: {UNCHANGED, IMPROVED, WORSE:83786}  Home Program Compliance: {YES/NA/NO:61498}  Treatment has included: {PT interventions:16024}    Subjective     Objective     Assessment/Plan    Progress toward previous goals: {all/partially/not met:78070}    Goals  Short-term goals (STG): ***  Long-term goals (LTG): ***      Recommendations: {Reassess plan:81936}  Timeframe: { timeframe:1902655972}  Prognosis to achieve goals: {GOOD/FAIR/POOR:20264}    Timed:         Manual Therapy:    ***     mins  12888;     Therapeutic Exercise:    ***     mins  90801;     Neuromuscular Ramana:    ***    mins  98653;    Therapeutic Activity:     ***     mins  40617;     Gait Training:      ***     mins  52545;     Ultrasound:     ***     mins  94046;    Ionto                               ***    mins   26842  Self Care                       ***     mins   88675  Aquatic                          ***     mins 50579      Un-Timed:  Electrical Stimulation:    ***     mins  27398 ( );  Dry Needling     ***     mins self-pay  Traction     ***     mins 30932  Low Eval     ***     Mins  75173  Mod Eval     ***     Mins  12894  High Eval                       ***     Mins  26769  Re-Eval                           ***    mins  07236      Timed Treatment:   ***   mins   Total Treatment:     ***   mins      PT Signature: Taya Gonsalez, PT  [place] License #: [id number]        Thank you for  this referral.

## 2023-12-08 ENCOUNTER — TELEPHONE (OUTPATIENT)
Dept: FAMILY MEDICINE CLINIC | Facility: CLINIC | Age: 38
End: 2023-12-08

## 2023-12-08 NOTE — TELEPHONE ENCOUNTER
Caller: MICHEAL    Relationship: [facility]    Best call back number: [phone]     What form or medical record are you requesting: MEDICAL CLEARANCE FORM    How would you like to receive the form or medical records (pick-up, mail, fax): FAX  If fax, what is the fax number: [phone]    Additional notes: STATES THAT SHE SPOKE WITH BETHANIE IN REGARDS TO MEDICAL CLEARANCE FORM FROM PATIENT'S PCP, BUT HAD NOT RECEIVED IT. CALL IF ADDITIONAL FOLLOW UP NEEDED

## 2023-12-08 NOTE — TELEPHONE ENCOUNTER
MICHEAL CALLED BACK EXPLAINING THAT THE PAPERWORK WAS MISSING A CHECK INDICATING IF THE PATIENT IS CLEARED FOR SURGERY OR NOT. COULD THIS BE ADDRESSED AND RE-FAXED?    SHE ALSO ADDED THAT IF RADHA SCHAEFER'S TEAM HAD ANY TEST RESULTS, LIKE AN EKG OR LABS, THAT WOULD GREATLY BENEFIT THE PATIENT'S CARE. PLEASE FAX THESE AS WELL IF AVAILABLE.

## 2023-12-12 DIAGNOSIS — K21.9 GASTROESOPHAGEAL REFLUX DISEASE, UNSPECIFIED WHETHER ESOPHAGITIS PRESENT: ICD-10-CM

## 2023-12-12 RX ORDER — PANTOPRAZOLE SODIUM 40 MG/1
40 TABLET, DELAYED RELEASE ORAL DAILY
Qty: 90 TABLET | Refills: 0 | Status: SHIPPED | OUTPATIENT
Start: 2023-12-12

## 2023-12-12 NOTE — TELEPHONE ENCOUNTER
Rx Refill Note  Requested Prescriptions     Pending Prescriptions Disp Refills    pantoprazole (PROTONIX) 40 MG EC tablet [Pharmacy Med Name: PANTOPRAZOLE 40MG TABLETS] 90 tablet 0     Sig: TAKE 1 TABLET BY MOUTH DAILY      Last office visit with prescribing clinician: Visit date not found   Last telemedicine visit with prescribing clinician: Visit date not found   Next office visit with prescribing clinician: Visit date not found       {TIP  Please add Last Relevant Lab Date if appropriate: 11/29/23                 Would you like a call back once the refill request has been completed: [] Yes [] No    If the office needs to give you a call back, can they leave a voicemail: [] Yes [] No    Sena Hernandez MA  12/12/23, 11:42 EST

## 2023-12-18 ENCOUNTER — TREATMENT (OUTPATIENT)
Dept: PHYSICAL THERAPY | Facility: CLINIC | Age: 38
End: 2023-12-18
Payer: COMMERCIAL

## 2023-12-18 DIAGNOSIS — M17.12 UNILATERAL PRIMARY OSTEOARTHRITIS, LEFT KNEE: Primary | ICD-10-CM

## 2023-12-18 DIAGNOSIS — M25.562 ACUTE PAIN OF LEFT KNEE: ICD-10-CM

## 2023-12-18 DIAGNOSIS — M25.662 IMPAIRED RANGE OF MOTION OF LEFT KNEE: ICD-10-CM

## 2023-12-18 DIAGNOSIS — R26.2 DIFFICULTY IN WALKING: ICD-10-CM

## 2023-12-18 NOTE — PROGRESS NOTES
Physical Therapy Initial Evaluation and Plan of Care      [address] [place]   [address]    Patient: Sena Min   : 1985  Diagnosis/ICD-10 Code:  Unilateral primary osteoarthritis, left knee [M17.12]; impaired ROM (L) knee M25.662; acute pain of left knee M25.562; difficulty in walking R26.2  Referring practitioner: Law Ambrosio MD  Date of Initial Visit: 2023  Today's Date: 2023  Patient seen for 1 sessions           Subjective Questionnaire: LEFS: 10/80 indicates 87.5 % functional impairment       Subjective Evaluation    History of Present Illness  Date of surgery: 12/15/2023  Mechanism of injury: - Pt reported having a patellofemoral replacement  on 12/15/23  -  patellofemoral replacement and tibial osteotomy with closed wedge procedure on 9/15/23   - Pt reported she is having more pain at (L) knee than she excepted.   - Pt traveled to [place] to have surgery.  Traveled home on 23.  Stopped half way at aunt's.  Returned home locally on 23  - Pt plans to stay with her father for several days due to limited mobility, need for assist with household tasks and transportation.         Patient Occupation: Unemployed Pain  Current pain ratin  At best pain ratin  At worst pain ratin  Location: (L) knee  Quality: sharp and dull ache  Relieving factors: medications and ice (elevating)  Aggravating factors: ambulation and standing (While performing transfers. lowering or lifting leg)  Progression: no change    Social Support  Lives in: multiple-level home (Currently staying with Dad - able to stay on 1st floor there with 1 step entry)  Lives with: alone (pt is currently staying with her father and his wife who are providing transportation and assisting with transfers and ADLs)    Treatments  Previous treatment: physical therapy (prior to surgery and post (R) knee surgery)  Current treatment: medication  Current treatment comments: accupunture.   Discharged  from (in last 30 days): inpatient hospitalization  Discharged from (in last 30 days) comments: overnight following surgery.   Patient Goals  Patient goals for therapy: decreased pain, increased strength, increased motion, return to work and return to sport/leisure activities  Patient goal: Hike           PMHx: depression; anxiety; h/o (R) meniscus repair  ; patellofemoral replacement and tibial osteotomy with closed wedge procedure on 9/15/23   Objective          Observations     Additional Knee Observation Details  (L) knee incision site intact with surgical glue and steri strips.  No drainage observed    Active Range of Motion     Additional Active Range of Motion Details  (R) knee AROM: 0-3 -120 deg  (L) knee AROM: 0- 8 - 45 deg (limited by pain)     Strength/Myotome Testing     Left Hip   Planes of Motion   Flexion: 5  Abduction: 5    Right Hip   Planes of Motion   Flexion: 3-  Abduction: 3-    Left Knee   Flexion: 3-  Extension: 3-  Quadriceps contraction: poor    Right Knee   Flexion: 5  Extension: 5  Quadriceps contraction: fair    Additional Strength Details  Unable to perform (L) SLR    Swelling     Left Knee Girth Measurement (cm)   Joint line: 45.5 cm.    Right Knee Girth Measurement (cm)   Joint line: 41.8 cm.    Ambulation     Comments       Gait: 3 minutes and 10 seconds to ambulate 80 ft with Rwx, WBAT    Functional Assessment     Comments  TRANSFERS:  Supine <> sit onto mat table with assistance for (L) LE          Assessment & Plan       Assessment  Impairments: abnormal gait, abnormal or restricted ROM, activity intolerance, impaired balance, impaired physical strength, lacks appropriate home exercise program, pain with function and weight-bearing intolerance   Functional limitations: carrying objects, walking, uncomfortable because of pain, moving in bed, sitting, standing and stooping   Assessment details: Pt is a 38 yr/o female presenting with knee pain; impaired ROM, strength and  impaired  mobility and function following knee surgery.     Per LEFS, reporting % functional impairment.     Pt presents to evaluation in manual w/c.  Using RWX for limited ambulation distances WBAT.  Pt is currently unable to drive.  Pt's family providing transportation.    PLOF: pt was working full time on -site, (I) with driving, household and self care ADLs without AD and was living alone     Recommend skilled OPPT to address above impairments.  Pt in agreement   Barriers to therapy: pain; recent (R) knee surgery  Prognosis: good    Goals  Plan Goals: STG: to be met within 10 visits   1. Pt to be (I) with initial HEP for self management of impairments:  2. Pt will be mod (I) with sit <> stand, SPS and supine <> sit transfers  3. Pt will demonstrate (L) knee AROM : 0-5-110 deg for improved ability to perform transitions and transfers.   4. Pt will report highest level of pain as 4/10 for improved tolerance to activity and improved restorative sleep pattern      LTG: to be met by DC - 20 visits  1. Pt to be (I) with finalized HEP to maximize outcomes  2. Pt to report improved function per LEFS to greater than 80% for improved function   3. Pt will demonstrate (L) knee flex/ext and (L) hip flex/abd/ext = 5/5 for improved function  4. Pt will have ability to ambulate 15 minutes or greater without AD for community mobility and ability to return to work on-site.     Plan  Therapy options: will be seen for skilled therapy services  Planned modality interventions: ultrasound, cryotherapy, electrical stimulation/Russian stimulation, thermotherapy (hydrocollator packs), dry needling and TENS  Planned therapy interventions: balance/weight-bearing training, body mechanics training, flexibility, gait training, home exercise program, joint mobilization, manual therapy, neuromuscular re-education, soft tissue mobilization, spinal/joint mobilization, strengthening, therapeutic activities and stretching  Other planned therapy  interventions: aquatic therapy / massage / Reiki therapy  Frequency: 2x week (2-3 x per week)  Duration in visits: 20  Duration in weeks: 12  Treatment plan discussed with: patient          History # of Personal Factors and/or Comorbidities: MODERATE (1-2)  Examination of Body System(s): # of elements: MODERATE (3)  Clinical Presentation: EVOLVING  Clinical Decision Making: MODERATE      Timed:         Manual Therapy:    0     mins  49504;     Therapeutic Exercise:    25     mins  15754;     Neuromuscular Ramana:    0    mins  88982;    Therapeutic Activity:     0     mins  55972;     Gait Trainin     mins  18406;     Ultrasound:     0     mins  13390;    Ionto                               0    mins   31638  Self Care                       0     mins   02378  Aquatic                          0     mins 72987      Un-Timed:  Electrical Stimulation:    15    mins  38324 ( );  Dry Needling     0     mins self-pay  Traction     0     mins 58347  Low Eval    25     Mins  35497  Mod Eval    0    Mins  56450  High Eval                       0     Mins  06226  Re-Eval                           0    mins  20824        Timed Treatment:    25   mins   Total Treatment:      65 mins    PT SIGNATURE: Taya Gonsalez PT   [place] License#: [id number]      Electronically signed by Taya Gonsalez PT, 23, 4:09 PM EDT      Initial Certification  Certification Period:  2023 thru 3/16/2024  I certify that the therapy services are furnished while this patient is under my care.  The services outlined above are required by this patient, and will be reviewed every 90 days.       Physician Signature:__________________________________________________    PHYSICIAN: Law Ambrosio MD      DATE:     Please sign and return via fax to [phone].. Thank you, [facility].

## 2023-12-22 ENCOUNTER — TREATMENT (OUTPATIENT)
Dept: PHYSICAL THERAPY | Facility: CLINIC | Age: 38
End: 2023-12-22
Payer: COMMERCIAL

## 2023-12-22 DIAGNOSIS — M17.12 UNILATERAL PRIMARY OSTEOARTHRITIS, LEFT KNEE: Primary | ICD-10-CM

## 2023-12-22 DIAGNOSIS — M25.562 ACUTE PAIN OF LEFT KNEE: ICD-10-CM

## 2023-12-22 DIAGNOSIS — R26.2 DIFFICULTY IN WALKING: ICD-10-CM

## 2023-12-22 DIAGNOSIS — M25.662 IMPAIRED RANGE OF MOTION OF LEFT KNEE: ICD-10-CM

## 2023-12-22 PROCEDURE — 97014 ELECTRIC STIMULATION THERAPY: CPT | Performed by: PHYSICAL THERAPIST

## 2023-12-22 PROCEDURE — 97110 THERAPEUTIC EXERCISES: CPT | Performed by: PHYSICAL THERAPIST

## 2023-12-22 NOTE — PROGRESS NOTES
Physical Therapy Daily Treatment Note    [facility], [place]      Patient: Sena Min   : 1985  Referring practitioner: Law Ambrosio MD  Date of Initial Visit: Type: THERAPY  Noted: 2023  Today's Date: 2023  Patient seen for 2 sessions       Visit Diagnoses:    ICD-10-CM ICD-9-CM   1. Unilateral primary osteoarthritis, left knee  M17.12 715.16   2. Impaired range of motion of left knee  M25.662 719.56   3. Acute pain of left knee  M25.562 719.46   4. Difficulty in walking  R26.2 719.7       Subjective Evaluation    History of Present Illness    Subjective comment: no pain at rest. 3/10 at most. using walker . had to cx the other day due to GI upset from laxative. Better today.       Objective   See Exercise, Manual, and Modality Logs for complete treatment.       Assessment & Plan       Assessment  Assessment details: Using a leg  at home to help with bed mobility. Able to SLR indep today with minimal pain.   Added stair lunge stretch to work on flexion. ROM to 84 after nu step at end of rx.   Will start with nu step next rx.     Also using her SCD calf sleeves at home prn for her Factor V dx and taking Eloquis 4 weeks post op.           Timed:         Manual Therapy:         mins  59580;     Therapeutic Exercise:    45     mins  34362;     Neuromuscular Ramana:        mins  81649;    Therapeutic Activity:          mins  41521;     Gait Training:           mins  91583;     Ultrasound:          mins  48739;    Ionto                                   mins   35743  Self Care                            mins   77644  Canalith Repos         mins 66055  Work hardening   __   min 15629/25611      Un-Timed:  Electrical Stimulation:     15    mins  24406 ( );  Dry Needling          mins self-pay  Traction          mins 26284      Timed Treatment:  45    mins   Total Treatment:     60   mins    Zorre Zeno Kimura, PT  [place] License: [id number]    [place] License:  [id number]

## 2023-12-26 ENCOUNTER — TREATMENT (OUTPATIENT)
Dept: PHYSICAL THERAPY | Facility: CLINIC | Age: 38
End: 2023-12-26
Payer: COMMERCIAL

## 2023-12-26 DIAGNOSIS — M25.662 IMPAIRED RANGE OF MOTION OF LEFT KNEE: ICD-10-CM

## 2023-12-26 DIAGNOSIS — R26.2 DIFFICULTY IN WALKING: ICD-10-CM

## 2023-12-26 DIAGNOSIS — M17.12 UNILATERAL PRIMARY OSTEOARTHRITIS, LEFT KNEE: Primary | ICD-10-CM

## 2023-12-26 DIAGNOSIS — M25.562 ACUTE PAIN OF LEFT KNEE: ICD-10-CM

## 2023-12-26 PROCEDURE — 97014 ELECTRIC STIMULATION THERAPY: CPT | Performed by: PHYSICAL THERAPIST

## 2023-12-26 PROCEDURE — 97110 THERAPEUTIC EXERCISES: CPT | Performed by: PHYSICAL THERAPIST

## 2023-12-26 NOTE — PROGRESS NOTES
Physical Therapy Daily Treatment Note    [facility], [place]      Patient: Sena Min   : 1985  Referring practitioner: Law Ambrosio MD  Date of Initial Visit: Type: THERAPY  Noted: 2023  Today's Date: 2023  Patient seen for 3 sessions       Visit Diagnoses:    ICD-10-CM ICD-9-CM   1. Unilateral primary osteoarthritis, left knee  M17.12 715.16   2. Impaired range of motion of left knee  M25.662 719.56   3. Acute pain of left knee  M25.562 719.46   4. Difficulty in walking  R26.2 719.7       Subjective patient reports: pain level 0/10 at rest; active 2/10; took pain med over holiday due to activity; but, overall, continues to improve. Applied Neosporin to incision at home and to have acupuncture next week. States acupuncture ok after 2 weeks per MD.    Objective   See Exercise, Manual, and Modality Logs for complete treatment.     Goals  Plan Goals: STG: to be met within 10 visits   1. Pt to be (I) with initial HEP for self management of impairments:  2. Pt will be mod (I) with sit <> stand, SPS and supine <> sit transfers  3. Pt will demonstrate (L) knee AROM : 0-5-110 deg for improved ability to perform transitions and transfers.   4. Pt will report highest level of pain as 4/10 for improved tolerance to activity and improved restorative sleep pattern        LTG: to be met by DC - 20 visits  1. Pt to be (I) with finalized HEP to maximize outcomes  2. Pt to report improved function per LEFS to greater than 80% for improved function   3. Pt will demonstrate (L) knee flex/ext and (L) hip flex/abd/ext = 5/5 for improved function  4. Pt will have ability to ambulate 15 minutes or greater without AD for community mobility and ability to return to work on-site.       Assessment/Plan  - able to move to 2nd step for knee flexion stretch without issue.  - improved mobility of knee.  - advised to discontinue use of neosporin ointment to knee unless cleared by MD.    Timed:         Manual  Therapy:         mins  57699;     Therapeutic Exercise:    30     mins  19973;     Neuromuscular Ramana:        mins  02919;    Therapeutic Activity:          mins  57412;     Gait Training:           mins  54455;     Ultrasound:          mins  86370;    Ionto                                   mins   59098  Self Care                            mins   80654  Canalith Repos         mins 93502  Work hardening   __   min 88280/90050      Un-Timed:  Electrical Stimulation:     15    mins  53723 ( );  Dry Needling          mins self-pay  Traction          mins 66673      Timed Treatment:  30    mins   Total Treatment:     45   mins    Codey Vasques PT, DPT  [place] License#: [id number]

## 2023-12-27 ENCOUNTER — TREATMENT (OUTPATIENT)
Dept: PHYSICAL THERAPY | Facility: CLINIC | Age: 38
End: 2023-12-27
Payer: COMMERCIAL

## 2023-12-27 DIAGNOSIS — M25.662 IMPAIRED RANGE OF MOTION OF LEFT KNEE: ICD-10-CM

## 2023-12-27 DIAGNOSIS — M25.562 ACUTE PAIN OF LEFT KNEE: ICD-10-CM

## 2023-12-27 DIAGNOSIS — R26.2 DIFFICULTY IN WALKING: ICD-10-CM

## 2023-12-27 DIAGNOSIS — M17.12 UNILATERAL PRIMARY OSTEOARTHRITIS, LEFT KNEE: Primary | ICD-10-CM

## 2023-12-27 NOTE — PROGRESS NOTES
Physical Therapy Treatment  Note  [address] [facility] [place]   [address]     Diagnosis Plan   1. Unilateral primary osteoarthritis, left knee        2. Impaired range of motion of left knee        3. Acute pain of left knee        4. Difficulty in walking            VISIT#: 4    Subjective   Sena Min reports: 0/10 pain at rest, 2/10 with motion at (L) knee        Objective     See Exercise, Manual, and Modality Logs for complete treatment.     Patient Education:    Goals  Plan Goals: STG: to be met within 10 visits   1. Pt to be (I) with initial HEP for self management of impairments:  2. Pt will be mod (I) with sit <> stand, SPS and supine <> sit transfers  3. Pt will demonstrate (L) knee AROM : 0-5-110 deg for improved ability to perform transitions and transfers.   4. Pt will report highest level of pain as 4/10 for improved tolerance to activity and improved restorative sleep pattern        LTG: to be met by DC - 20 visits  1. Pt to be (I) with finalized HEP to maximize outcomes  2. Pt to report improved function per LEFS to greater than 80% for improved function   3. Pt will demonstrate (L) knee flex/ext and (L) hip flex/abd/ext = 5/5 for improved function  4. Pt will have ability to ambulate 15 minutes or greater without AD for community mobility and ability to return to work on-site.     Assessment/Plan  - Encouraged improved (L) heel strike during gait cycle.  Pt demonstrated improved mechanics at conclusion of session  - Pt reported minimal (L) knee pain at end of session; 1-2/10  - IFC and ice utilized at end of session for pain modulation.   Progress per Plan of Care            Timed:         Manual Therapy:    0     mins  41036;     Therapeutic Exercise:    30     mins  49986;     Neuromuscular Ramana:    0    mins  22680;    Therapeutic Activity:     0     mins  53777;     Gait Trainin     mins  18117;     Ultrasound:     0     mins  53171;    Ionto                                0    mins   74605  Self Care                       0     mins   48990  Canalith Repos               0    mins  4209  Aquatic                          0     mins 16440    Un-Timed:  Electrical Stimulation:    15     mins  80112 ( );  Dry Needling     0     mins self-pay  Traction     0     mins 33926  Low Eval     0     Mins  46630  Mod Eval     0     Mins  31886  High Eval                       0     Mins  15613  Re-Eval                           0    mins  86030    Timed Treatment:   30   mins   Total Treatment:     45   mins    Taya Gonsalez, PT PT, DPT, [id number]

## 2024-01-04 ENCOUNTER — TREATMENT (OUTPATIENT)
Dept: PHYSICAL THERAPY | Facility: CLINIC | Age: 39
End: 2024-01-04
Payer: COMMERCIAL

## 2024-01-04 DIAGNOSIS — M25.662 IMPAIRED RANGE OF MOTION OF LEFT KNEE: ICD-10-CM

## 2024-01-04 DIAGNOSIS — M17.12 UNILATERAL PRIMARY OSTEOARTHRITIS, LEFT KNEE: Primary | ICD-10-CM

## 2024-01-04 DIAGNOSIS — R26.2 DIFFICULTY IN WALKING: ICD-10-CM

## 2024-01-04 DIAGNOSIS — M25.562 ACUTE PAIN OF LEFT KNEE: ICD-10-CM

## 2024-01-04 NOTE — PROGRESS NOTES
Physical Therapy  Progress Note/Reassessment  [address], [place] [address], Phone: [phone]    Patient: Sena Min   : 1985  Diagnosis/ICD-10 Code:  Unilateral primary osteoarthritis, left knee [M17.12]  Referring practitioner: Law Ambrosio MD  Date of Initial Visit: Type: THERAPY  Noted: 2023  Today's Date: 2024  Patient seen for 5 sessions      Subjective:   Visit Diagnosis:    ICD-10-CM ICD-9-CM   1. Unilateral primary osteoarthritis, left knee  M17.12 715.16   2. Impaired range of motion of left knee  M25.662 719.56   3. Difficulty in walking  R26.2 719.7   4. Acute pain of left knee  M25.562 719.46       Sena Harrisbarronalf reports: pain at night affecting sleep.  Now able to perform SLR   Subjective Questionnaire: LEFS: 20/80 indicates 75% functional impairment.  (at initial evaluation: LEFS: 10/80 indicates 87.5 % functional impairment  )  Clinical Progress: improved  Home Program Compliance: Yes  Treatment has included: therapeutic exercise, manual therapy, therapeutic activity, electrical stimulation, and cryotherapy    Subjective   Pain  Current pain ratin  At best pain ratin  At worst pain rating: 3 (at initial evaluation)     Objective   Active Range of Motion   (L) knee AROM: 0- 8 - 105 deg   Strength/Myotome Testing      Left Hip   Planes of Motion   Flexion:4-  Abduction:4     Left Knee   Flexion: 5  Extension: 4-  Quadriceps contraction: poor        Additional Strength Details      Assessment/Plan  - Moved seat  Nustep to encourage increased knee flexion ROM   - Pt has been able to transition to lesser AD.  Improved ROM and strength of (L) LE noted upon goniometric measurement and MMT  - Pt is now mod (I) /(I) with bed mobility and functional transfers.  -Recommend continued therapy to address remaining deficits to promote improved function and return to full time employment as able    Progress toward previous goals: Partially  Met      Goals  Plan Goals: STG: to be met within 10 visits   1. Pt to be (I) with initial HEP for self management of impairments: MET  2. Pt will be mod (I) with sit <> stand, SPS and supine <> sit transfers MET  3. Pt will demonstrate (L) knee AROM : 0-5-110 deg for improved ability to perform transitions and transfers. PROGRESSING, see goniometric measurements above  4. Pt will report highest level of pain as 4/10 for improved tolerance to activity and improved restorative sleep pattern; MET, highest level of pain in the last 3 days = 3/10 per patient report        LTG: to be met by DC - 20 visits  1. Pt to be (I) with finalized HEP to maximize outcomes  2. Pt to report improved function per LEFS to greater than 80% for improved function   3. Pt will demonstrate (L) knee flex/ext and (L) hip flex/abd/ext = 5/5 for improved function: PROGRESSING, see objective findings section   4. Pt will have ability to ambulate 15 minutes or greater without AD for community mobility and ability to return to work on-site. Progressing, pt has progressed to use of cane versus walker  Short-term goals (STG): 3/4  Long-term goals (LTG):       Recommendations: Continue as planned  Timeframe: 1 month  Prognosis to achieve goals: good    Timed:         Manual Therapy:    5     mins  96916;     Therapeutic Exercise:    35     mins  50901;     Neuromuscular Ramana:    0    mins  79110;    Therapeutic Activity:     10     mins  54306;     Gait Trainin     mins  85396;     Ultrasound:     0     mins  81939;    Ionto                               0    mins   35067  Self Care                       0     mins   42847  Aquatic                          0     mins 57873      Un-Timed:  Electrical Stimulation:    15     mins  24651 ( );  Dry Needling     0     mins self-pay  Traction     0     mins 41988  Low Eval     0     Mins  89234  Mod Eval     0     Mins  91681  High Eval                       0     Mins  04861  Re-Eval                            0    mins  22607      Timed Treatment:   50   mins   Total Treatment:     65   mins      PT Signature: Taya Gonsalez, PT  [place] License #: [id number]

## 2024-01-04 NOTE — LETTER
Progress Note  2024  Law Ambrosio MD    Re: Sena CAMPBELL Ericadam  ________________________________________________________________    Physical Therapy  Progress Note/Reassessment  [address] [address], Phone: [phone]    Patient: Sena CAMPBELL Pako   : 1985  Diagnosis/ICD-10 Code:  Unilateral primary osteoarthritis, left knee [M17.12]  Referring practitioner: Law Ambrosio MD  Date of Initial Visit: Type: THERAPY  Noted: 2023  Today's Date: 2024  Patient seen for 5 sessions      Subjective:   Visit Diagnosis:    ICD-10-CM ICD-9-CM   1. Unilateral primary osteoarthritis, left knee  M17.12 715.16   2. Impaired range of motion of left knee  M25.662 719.56   3. Difficulty in walking  R26.2 719.7   4. Acute pain of left knee  M25.562 719.46       Sena Munroefoster reports: pain at night affecting sleep.  Now able to perform SLR   Subjective Questionnaire: LEFS: 20/80 indicates 75% functional impairment.  (at initial evaluation: LEFS: 10/80 indicates 87.5 % functional impairment  )  Clinical Progress: improved  Home Program Compliance: Yes  Treatment has included: therapeutic exercise, manual therapy, therapeutic activity, electrical stimulation, and cryotherapy    Subjective   Pain  Current pain ratin  At best pain ratin  At worst pain rating: 3 (at initial evaluation)     Objective   Active Range of Motion   (L) knee AROM: 0- 8 - 105 deg   Strength/Myotome Testing      Left Hip   Planes of Motion   Flexion:4-  Abduction:4     Left Knee   Flexion: 5  Extension: 4-  Quadriceps contraction: poor        Additional Strength Details      Assessment/Plan  - Moved seat  Nustep to encourage increased knee flexion ROM   - Pt has been able to transition to lesser AD.  Improved ROM and strength of (L) LE noted upon goniometric measurement and MMT  - Pt is now mod (I) /(I) with bed mobility and functional transfers.  -Recommend continued therapy to  address remaining deficits to promote improved function and return to full time employment as able    Progress toward previous goals: Partially Met      Goals  Plan Goals: STG: to be met within 10 visits   1. Pt to be (I) with initial HEP for self management of impairments: MET  2. Pt will be mod (I) with sit <> stand, SPS and supine <> sit transfers MET  3. Pt will demonstrate (L) knee AROM : 0-5-110 deg for improved ability to perform transitions and transfers. PROGRESSING, see goniometric measurements above  4. Pt will report highest level of pain as 4/10 for improved tolerance to activity and improved restorative sleep pattern; MET, highest level of pain in the last 3 days = 3/10 per patient report        LTG: to be met by DC - 20 visits  1. Pt to be (I) with finalized HEP to maximize outcomes  2. Pt to report improved function per LEFS to greater than 80% for improved function   3. Pt will demonstrate (L) knee flex/ext and (L) hip flex/abd/ext = 5/5 for improved function: PROGRESSING, see objective findings section   4. Pt will have ability to ambulate 15 minutes or greater without AD for community mobility and ability to return to work on-site. Progressing, pt has progressed to use of cane versus walker  Short-term goals (STG): 3/4  Long-term goals (LTG):       Recommendations: Continue as planned  Timeframe: 1 month  Prognosis to achieve goals: good    Timed:         Manual Therapy:    5     mins  50052;     Therapeutic Exercise:    35     mins  22678;     Neuromuscular Ramana:    0    mins  81184;    Therapeutic Activity:     10     mins  30128;     Gait Trainin     mins  15347;     Ultrasound:     0     mins  17079;    Ionto                               0    mins   41380  Self Care                       0     mins   53700  Aquatic                          0     mins 34295      Un-Timed:  Electrical Stimulation:    15     mins  67674 ( );  Dry Needling     0     mins self-pay  Traction     0      mins 00990  Low Eval     0     Mins  62195  Mod Eval     0     Mins  23499  High Eval                       0     Mins  16786  Re-Eval                           0    mins  01977      Timed Treatment:   50   mins   Total Treatment:     65   mins      PT Signature: Taya Gonsalez, PT  [place] License #: [id number]     Thank you for this referral.

## 2024-01-08 ENCOUNTER — OFFICE VISIT (OUTPATIENT)
Dept: FAMILY MEDICINE CLINIC | Facility: CLINIC | Age: 39
End: 2024-01-08
Payer: COMMERCIAL

## 2024-01-08 VITALS
HEIGHT: 66 IN | HEART RATE: 91 BPM | WEIGHT: 248.8 LBS | OXYGEN SATURATION: 98 % | TEMPERATURE: 98 F | BODY MASS INDEX: 39.98 KG/M2 | SYSTOLIC BLOOD PRESSURE: 124 MMHG | DIASTOLIC BLOOD PRESSURE: 89 MMHG

## 2024-01-08 DIAGNOSIS — Z98.890 STATUS POST KNEE SURGERY: Primary | ICD-10-CM

## 2024-01-08 PROCEDURE — 99213 OFFICE O/P EST LOW 20 MIN: CPT | Performed by: NURSE PRACTITIONER

## 2024-01-08 NOTE — PROGRESS NOTES
"Chief Complaint  Follow-up (Surgery f/u on left partial knee replacement )    Subjective        Sena Min presents to [facility]  History of Present Illness  pt here for post op follow up.  Just had second knee surgery done out of town again.  Went well and recovering as expected.  She is having a little trouble getting comfortable at night and not sleeping well, but once she stops Eliquis on Monday she will be able to take NSAIDs which previously helped.  Needs to get post op knee XR done as done before to eval healing.  Doing PT.  No longer taking opioids for pain.  Walking some at home without cane.  Had some constipation from pain meds this last time, resolved.      A little more stressed recently due to recently losing job.  She had interview today and another tomorrow so hopeful she will have job soon.  Was able to get health insurance which was a concern for her.        Objective   Vital Signs:  /89   Pulse 91   Temp 98 °F (36.7 °C) (Temporal)   Ht 167.6 cm (66\")   Wt 113 kg (248 lb 12.8 oz)   SpO2 98%   BMI 40.16 kg/m²   Estimated body mass index is 40.16 kg/m² as calculated from the following:    Height as of this encounter: 167.6 cm (66\").    Weight as of this encounter: 113 kg (248 lb 12.8 oz).               Physical Exam  Vitals and nursing note reviewed.   Constitutional:       General: She is not in acute distress.     Appearance: She is well-developed. She is not ill-appearing or diaphoretic.   HENT:      Head: Normocephalic and atraumatic.   Eyes:      General:         Right eye: No discharge.         Left eye: No discharge.      Conjunctiva/sclera: Conjunctivae normal.   Cardiovascular:      Rate and Rhythm: Normal rate and regular rhythm.      Heart sounds: Normal heart sounds.   Pulmonary:      Effort: Pulmonary effort is normal.      Breath sounds: Normal breath sounds.   Abdominal:      General: Bowel sounds are normal.      Palpations: " Abdomen is soft.      Tenderness: There is no abdominal tenderness.   Musculoskeletal:         General: No deformity.      Comments: Left knee incision well healed, no erythema, min swelling and warmth of knee. No s/s complications. Steady ambulation with cane. Good ROM with ambulation for post op   Skin:     General: Skin is warm and dry.   Neurological:      Mental Status: She is alert and oriented to person, place, and time.   Psychiatric:         Mood and Affect: Mood normal.         Behavior: Behavior normal.        Result Review :                   Assessment and Plan   Diagnoses and all orders for this visit:    1. Status post knee surgery (Primary)  -     XR Knee 1 or 2 View Left; Future    XR ordered where pt has requested.  She will get tomorrow or next.  We discussed expected healing process.  Seems to be doing well.  Has good understanding of tx plan, expectations for healing.  Will stop anticoags as directed and then will start NSAIDS prn pain.  Doing PT and has joined gym and been working on arms and core some.           Follow Up   No follow-ups on file.  Patient was given instructions and counseling regarding her condition or for health maintenance advice. Please see specific information pulled into the AVS if appropriate.         Answers submitted by the patient for this visit:  Other (Submitted on 1/1/2024)  Please describe your symptoms.: Post surgery  Have you had these symptoms before?: No  How long have you been having these symptoms?: Greater than 2 weeks  Please list any medications you are currently taking for this condition.: Eliquis, Tylenol  Primary Reason for Visit (Submitted on 1/1/2024)  What is the primary reason for your visit?: Other

## 2024-01-10 ENCOUNTER — TREATMENT (OUTPATIENT)
Dept: PHYSICAL THERAPY | Facility: CLINIC | Age: 39
End: 2024-01-10
Payer: COMMERCIAL

## 2024-01-10 DIAGNOSIS — M25.662 IMPAIRED RANGE OF MOTION OF LEFT KNEE: ICD-10-CM

## 2024-01-10 DIAGNOSIS — M25.562 ACUTE PAIN OF LEFT KNEE: ICD-10-CM

## 2024-01-10 DIAGNOSIS — R26.2 DIFFICULTY IN WALKING: ICD-10-CM

## 2024-01-10 DIAGNOSIS — M17.12 UNILATERAL PRIMARY OSTEOARTHRITIS, LEFT KNEE: Primary | ICD-10-CM

## 2024-01-10 NOTE — PROGRESS NOTES
Physical Therapy Treatment  Note  [address] [place]   [address]     Diagnosis Plan   1. Unilateral primary osteoarthritis, left knee        2. Difficulty in walking        3. Acute pain of left knee        4. Impaired range of motion of left knee            VISIT#: 6    Subjective   Sena Min reports: pain at (L) knee as 3/10 upon arrival to therapy       Objective     See Exercise, Manual, and Modality Logs for complete treatment.     Patient Education:    Goals- most recent progress note completed on 1/4/24  Plan Goals: STG: to be met within 10 visits   1. Pt to be (I) with initial HEP for self management of impairments: MET  2. Pt will be mod (I) with sit <> stand, SPS and supine <> sit transfers MET  3. Pt will demonstrate (L) knee AROM : 0-5-110 deg for improved ability to perform transitions and transfers. PROGRESSING, see goniometric measurements above  4. Pt will report highest level of pain as 4/10 for improved tolerance to activity and improved restorative sleep pattern; MET, highest level of pain in the last 3 days = 3/10 per patient report        LTG: to be met by DC - 20 visits  1. Pt to be (I) with finalized HEP to maximize outcomes  2. Pt to report improved function per LEFS to greater than 80% for improved function   3. Pt will demonstrate (L) knee flex/ext and (L) hip flex/abd/ext = 5/5 for improved function: PROGRESSING, see objective findings section   4. Pt will have ability to ambulate 15 minutes or greater without AD for community mobility and ability to return to work on-site. Progressing, pt has progressed to use of cane versus walker    Assessment/Plan  - Added seated hamstring curl on NK chair this date to promote improved strength  - Pt displays improved mobility as evidenced by decreased need for SPC for ambulation.   - IFC/e-stim utilized for pain modulation at end of session     Progress per Plan of Care            Timed:         Manual Therapy:    0     mins   03747;     Therapeutic Exercise:    40     mins  78810;     Neuromuscular Ramana:    0    mins  81888;    Therapeutic Activity:     0     mins  66669;     Gait Trainin     mins  49713;     Ultrasound:     0     mins  45671;    Ionto                               0    mins   28983  Self Care                       0     mins   90704  Canalith Repos               0    mins  4209  Aquatic                          0     mins 39604    Un-Timed:  Electrical Stimulation:    15     mins  12974 ( );  Dry Needling     0     mins self-pay  Traction     0     mins 91394  Low Eval     0     Mins  46651  Mod Eval     0     Mins  14867  High Eval                       0     Mins  77673  Re-Eval                           0    mins  95484    Timed Treatment:   40   mins   Total Treatment:     55   mins    Taya Gonsalez, PT PT, DPT, [id number]

## 2024-01-16 ENCOUNTER — TREATMENT (OUTPATIENT)
Dept: PHYSICAL THERAPY | Facility: CLINIC | Age: 39
End: 2024-01-16
Payer: COMMERCIAL

## 2024-01-16 DIAGNOSIS — R26.2 DIFFICULTY IN WALKING: ICD-10-CM

## 2024-01-16 DIAGNOSIS — M17.12 UNILATERAL PRIMARY OSTEOARTHRITIS, LEFT KNEE: Primary | ICD-10-CM

## 2024-01-16 DIAGNOSIS — M25.562 ACUTE PAIN OF LEFT KNEE: ICD-10-CM

## 2024-01-16 DIAGNOSIS — M25.662 IMPAIRED RANGE OF MOTION OF LEFT KNEE: ICD-10-CM

## 2024-01-16 NOTE — PROGRESS NOTES
Physical Therapy Treatment  Note  [address] [facility] [place]   [address]     Diagnosis Plan   1. Unilateral primary osteoarthritis, left knee        2. Difficulty in walking        3. Impaired range of motion of left knee        4. Acute pain of left knee            VISIT#: 7    Subjective   Sena Min reports: pain at (L) knee as 1/10 upon arrival to therapy. Patient reported she completed Eliquis  and was able to restart Diclofenac.  Pt also reported she is only using cane on stairs at this time.       Objective     See Exercise, Manual, and Modality Logs for complete treatment.     Patient Education:    Goals- most recent progress note completed on 1/4/24  Plan Goals: STG: to be met within 10 visits   1. Pt to be (I) with initial HEP for self management of impairments: MET  2. Pt will be mod (I) with sit <> stand, SPS and supine <> sit transfers MET  3. Pt will demonstrate (L) knee AROM : 0-5-110 deg for improved ability to perform transitions and transfers. PROGRESSING, see goniometric measurements above  4. Pt will report highest level of pain as 4/10 for improved tolerance to activity and improved restorative sleep pattern; MET, highest level of pain in the last 3 days = 3/10 per patient report        LTG: to be met by DC - 20 visits  1. Pt to be (I) with finalized HEP to maximize outcomes  2. Pt to report improved function per LEFS to greater than 80% for improved function   3. Pt will demonstrate (L) knee flex/ext and (L) hip flex/abd/ext = 5/5 for improved function: PROGRESSING, see objective findings section   4. Pt will have ability to ambulate 15 minutes or greater without AD for community mobility and ability to return to work on-site. Progressing, pt has progressed to use of cane versus walker    Assessment/Plan  - Able to progress from Nustep to recumbent bike to promote improved ROM and endurance.  Patient was able to complete full revolutions immediately.   - Added hip hiking  exercise with opposing LE on 8 inch step to aide in promoting improved functional strength and gait mechanics  - IFC/e-stim at end of session for pain modulation as patient reported knee was mildly sore and fatigued following exercise.    Progress per Plan of Care            Timed:         Manual Therapy:    0     mins  89827;     Therapeutic Exercise:    30     mins  18076;     Neuromuscular Ramana:    0    mins  57670;    Therapeutic Activity:     10    mins  17913;     Gait Trainin     mins  85071;     Ultrasound:     0     mins  10067;    Ionto                               0    mins   86953  Self Care                       0     mins   59285  Canalith Repos               0    mins  4209  Aquatic                          0     mins 99239    Un-Timed:  Electrical Stimulation:    15     mins  38007 ( );  Dry Needling     0     mins self-pay  Traction     0     mins 84908  Low Eval     0     Mins  73718  Mod Eval     0     Mins  70953  High Eval                       0     Mins  00762  Re-Eval                           0    mins  91673    Timed Treatment:   40   mins   Total Treatment:     55   mins    Taya Gonsalez, PT PT, DPT, [id number]

## 2024-01-17 ENCOUNTER — TREATMENT (OUTPATIENT)
Dept: PHYSICAL THERAPY | Facility: CLINIC | Age: 39
End: 2024-01-17
Payer: COMMERCIAL

## 2024-01-17 DIAGNOSIS — M25.662 IMPAIRED RANGE OF MOTION OF LEFT KNEE: ICD-10-CM

## 2024-01-17 DIAGNOSIS — M25.562 ACUTE PAIN OF LEFT KNEE: ICD-10-CM

## 2024-01-17 DIAGNOSIS — M17.12 UNILATERAL PRIMARY OSTEOARTHRITIS, LEFT KNEE: Primary | ICD-10-CM

## 2024-01-17 DIAGNOSIS — R26.2 DIFFICULTY IN WALKING: ICD-10-CM

## 2024-01-17 PROCEDURE — 97110 THERAPEUTIC EXERCISES: CPT | Performed by: PHYSICAL THERAPIST

## 2024-01-17 PROCEDURE — 97014 ELECTRIC STIMULATION THERAPY: CPT | Performed by: PHYSICAL THERAPIST

## 2024-01-17 NOTE — PROGRESS NOTES
Physical Therapy Daily Treatment Note    [facility], [place]      Patient: Sena Min   : 1985  Referring practitioner: Law Ambrosio MD  Date of Initial Visit: Type: THERAPY  Noted: 2023  Today's Date: 2024  Patient seen for 8 sessions       Visit Diagnoses:    ICD-10-CM ICD-9-CM   1. Unilateral primary osteoarthritis, left knee  M17.12 715.16   2. Difficulty in walking  R26.2 719.7   3. Impaired range of motion of left knee  M25.662 719.56   4. Acute pain of left knee  M25.562 719.46       Subjective Evaluation    History of Present Illness    Subjective comment: doing better. off blood thinner and feeling better back on her NSAID. minimal pain at night.       Objective   See Exercise, Manual, and Modality Logs for complete treatment.       Assessment & Plan       Assessment  Assessment details: Very strong and pronounced subluxation and return during mid range of LAQ and leg curls. Held off on this ex.   No significant c/o pain.   Did well with Norwegian estim today with goal of improving VMO contraction   Stated she contacted MD about issue.   KT tape added today with some help during lunge but not LAQ          Timed:         Manual Therapy:    5     mins  57454;     Therapeutic Exercise:    30     mins  56410;     Neuromuscular Ramana:        mins  85071;    Therapeutic Activity:          mins  05535;     Gait Training:           mins  66649;     Ultrasound:          mins  13350;    Ionto                                   mins   68936  Self Care                            mins   04837  Canalith Repos         mins 95538  Work hardening   __   min 86479/00144      Un-Timed:  Electrical Stimulation:    15     mins  04758 ( );  Dry Needling          mins self-pay  Traction          mins 03329      Timed Treatment:   35   mins   Total Treatment:     50   mins    Zorre Zeno Kimura, PT  [place] License: [id number]    [place] License:  [id number]

## 2024-01-22 ENCOUNTER — TREATMENT (OUTPATIENT)
Dept: PHYSICAL THERAPY | Facility: CLINIC | Age: 39
End: 2024-01-22
Payer: COMMERCIAL

## 2024-01-22 DIAGNOSIS — M25.562 ACUTE PAIN OF LEFT KNEE: ICD-10-CM

## 2024-01-22 DIAGNOSIS — M25.662 IMPAIRED RANGE OF MOTION OF LEFT KNEE: ICD-10-CM

## 2024-01-22 DIAGNOSIS — R26.2 DIFFICULTY IN WALKING: ICD-10-CM

## 2024-01-22 DIAGNOSIS — M17.12 UNILATERAL PRIMARY OSTEOARTHRITIS, LEFT KNEE: Primary | ICD-10-CM

## 2024-01-22 NOTE — PROGRESS NOTES
"Physical Therapy Treatment  Note  [address] [place]   [address]     Diagnosis Plan   1. Unilateral primary osteoarthritis, left knee        2. Difficulty in walking        3. Impaired range of motion of left knee        4. Acute pain of left knee            VISIT#: 9    Subjective   Sena Min reports: minimal pain upon arrival to therapy.  No significant \"popping/shifting\" of (L) knee today but did experience several episodes yesterday.       Objective     See Exercise, Manual, and Modality Logs for complete treatment.     Patient Education:    Assessment/Plan  - Held knee extension and flexion on weigh machine and leg press this date due to possible subluxation and/or shifting of (L) patella during these activities at previous treatment session  - Iraqi e-stim utilized during quad contraction/quad set to promote improved muscle contraction and knee stability.   - Pt has f/u with surgeon in [place] this week.  Plans to discuss in person shifting of (L) patella.  Patient has contacted previously via phone her concerns.       Progress per Plan of Care            Timed:         Manual Therapy:    0     mins  14281;     Therapeutic Exercise:    30     mins  01204;     Neuromuscular Ramana:    15    mins  84149;    Therapeutic Activity:     0     mins  82809;     Gait Trainin     mins  28167;     Ultrasound:     0     mins  00184;    Ionto                               0    mins   15272  Self Care                       0     mins   53957  Canalith Repos               0    mins  4209  Aquatic                          0     mins 26193    Un-Timed:  Electrical Stimulation:    0     mins  07800 ( );  Dry Needling     0     mins self-pay  Traction     0     mins 54160  Low Eval     0     Mins  59512  Mod Eval     0     Mins  89150  High Eval                       0     Mins  07844  Re-Eval                           0    mins  28782    Timed Treatment:   45   mins   Total Treatment:     45 "   mins    Taya Gonsalez, PT PT, DPT, [id number]

## 2024-01-23 ENCOUNTER — LAB (OUTPATIENT)
Dept: LAB | Facility: HOSPITAL | Age: 39
End: 2024-01-23
Payer: COMMERCIAL

## 2024-01-23 ENCOUNTER — OFFICE VISIT (OUTPATIENT)
Dept: ONCOLOGY | Facility: CLINIC | Age: 39
End: 2024-01-23
Payer: COMMERCIAL

## 2024-01-23 VITALS
HEIGHT: 66 IN | OXYGEN SATURATION: 99 % | WEIGHT: 245.5 LBS | BODY MASS INDEX: 39.46 KG/M2 | SYSTOLIC BLOOD PRESSURE: 137 MMHG | RESPIRATION RATE: 18 BRPM | HEART RATE: 72 BPM | TEMPERATURE: 98.2 F | DIASTOLIC BLOOD PRESSURE: 84 MMHG

## 2024-01-23 DIAGNOSIS — Z86.2 HISTORY OF IRON DEFICIENCY ANEMIA: ICD-10-CM

## 2024-01-23 DIAGNOSIS — D68.51 HETEROZYGOUS FACTOR V LEIDEN MUTATION: Primary | ICD-10-CM

## 2024-01-23 DIAGNOSIS — D68.51 HETEROZYGOUS FACTOR V LEIDEN MUTATION: ICD-10-CM

## 2024-01-23 DIAGNOSIS — D68.59 HYPERCOAGULABLE STATE: ICD-10-CM

## 2024-01-23 LAB
ALBUMIN SERPL-MCNC: 4 G/DL (ref 3.5–5.2)
ALBUMIN/GLOB SERPL: 1.2 G/DL
ALP SERPL-CCNC: 75 U/L (ref 39–117)
ALT SERPL W P-5'-P-CCNC: 41 U/L (ref 1–33)
ANION GAP SERPL CALCULATED.3IONS-SCNC: 9.5 MMOL/L (ref 5–15)
AST SERPL-CCNC: 37 U/L (ref 1–32)
BASOPHILS # BLD AUTO: 0.01 10*3/MM3 (ref 0–0.2)
BASOPHILS NFR BLD AUTO: 0.1 % (ref 0–1.5)
BILIRUB SERPL-MCNC: 0.4 MG/DL (ref 0–1.2)
BUN SERPL-MCNC: 16 MG/DL (ref 6–20)
BUN/CREAT SERPL: 21.9 (ref 7–25)
CALCIUM SPEC-SCNC: 9.5 MG/DL (ref 8.6–10.5)
CHLORIDE SERPL-SCNC: 106 MMOL/L (ref 98–107)
CO2 SERPL-SCNC: 24.5 MMOL/L (ref 22–29)
CREAT SERPL-MCNC: 0.73 MG/DL (ref 0.57–1)
DEPRECATED RDW RBC AUTO: 50.8 FL (ref 37–54)
EGFRCR SERPLBLD CKD-EPI 2021: 108.8 ML/MIN/1.73
EOSINOPHIL # BLD AUTO: 0.14 10*3/MM3 (ref 0–0.4)
EOSINOPHIL NFR BLD AUTO: 1.8 % (ref 0.3–6.2)
ERYTHROCYTE [DISTWIDTH] IN BLOOD BY AUTOMATED COUNT: 15.9 % (ref 12.3–15.4)
GLOBULIN UR ELPH-MCNC: 3.3 GM/DL
GLUCOSE SERPL-MCNC: 105 MG/DL (ref 65–99)
HCT VFR BLD AUTO: 38.7 % (ref 34–46.6)
HGB BLD-MCNC: 12.1 G/DL (ref 12–15.9)
IMM GRANULOCYTES # BLD AUTO: 0.02 10*3/MM3 (ref 0–0.05)
IMM GRANULOCYTES NFR BLD AUTO: 0.3 % (ref 0–0.5)
LYMPHOCYTES # BLD AUTO: 1.52 10*3/MM3 (ref 0.7–3.1)
LYMPHOCYTES NFR BLD AUTO: 19.2 % (ref 19.6–45.3)
MCH RBC QN AUTO: 27.4 PG (ref 26.6–33)
MCHC RBC AUTO-ENTMCNC: 31.3 G/DL (ref 31.5–35.7)
MCV RBC AUTO: 87.6 FL (ref 79–97)
MONOCYTES # BLD AUTO: 0.31 10*3/MM3 (ref 0.1–0.9)
MONOCYTES NFR BLD AUTO: 3.9 % (ref 5–12)
NEUTROPHILS NFR BLD AUTO: 5.93 10*3/MM3 (ref 1.7–7)
NEUTROPHILS NFR BLD AUTO: 74.7 % (ref 42.7–76)
NRBC BLD AUTO-RTO: 0 /100 WBC (ref 0–0.2)
PLATELET # BLD AUTO: 321 10*3/MM3 (ref 140–450)
PMV BLD AUTO: 10 FL (ref 6–12)
POTASSIUM SERPL-SCNC: 4 MMOL/L (ref 3.5–5.2)
PROT SERPL-MCNC: 7.3 G/DL (ref 6–8.5)
RBC # BLD AUTO: 4.42 10*6/MM3 (ref 3.77–5.28)
SODIUM SERPL-SCNC: 140 MMOL/L (ref 136–145)
WBC NRBC COR # BLD AUTO: 7.93 10*3/MM3 (ref 3.4–10.8)

## 2024-01-23 PROCEDURE — 85025 COMPLETE CBC W/AUTO DIFF WBC: CPT

## 2024-01-23 PROCEDURE — 36415 COLL VENOUS BLD VENIPUNCTURE: CPT

## 2024-01-23 PROCEDURE — 80053 COMPREHEN METABOLIC PANEL: CPT

## 2024-01-23 NOTE — PROGRESS NOTES
.     REASON FOR FOLLOWUP: Perioperative management of anticoagulation due to heterozygous factor V Leiden mutation.        HISTORY OF PRESENT ILLNESS:  The patient is a 38 y.o. year old female with the above-mentioned history who is here today for postoperative follow-up.  She underwent left knee replacement in December.  She had her right knee partial replacement in September.  She was put on Eliquis postoperatively for a month, and discontinued this on 1/15/2024.  She has a follow-up soon with her surgeon in [place].  She continues to do physical therapy, but reports that things are progressing quite nicely.  She denies issues with significant pain or swelling in her lower extremities.  No problems with shortness of breath, or chest pain.      Patient states that she has been taking Tylenol for pain control as she was afraid to take NSAIDs with the anticoagulation.          Past Medical History:   Diagnosis Date    Ankle sprain     Anxiety     Arthritis     Depression     Factor 5 Leiden mutation, heterozygous     GERD (gastroesophageal reflux disease)     History of medical problems     Factor V Leiden    History of torn meniscus of knee     S/P surgery    Myopia 02/19/2015    Obesity     Posterior subcapsular polar senile cataract of both eyes 06/06/2022     Past Surgical History:   Procedure Laterality Date    ANKLE OPEN REDUCTION INTERNAL FIXATION  Jan. 2001, Jan 2016    Set broken tib-fib, remove plate from fib    FRACTURE SURGERY  01/08/2001    Plate removed 01/15/2016    KNEE ARTHROPLASTY, PARTIAL REPLACEMENT Left 12/15/2023    LEG SURGERY  01/2016    plate removed    WISDOM TOOTH EXTRACTION  2009     HEMATOLOGY HISTORY: Sena Min is a 37-year-old  female presented 4/14/2023 for initial evaluation because of heterozygous factor V Leiden mutation.     Patient reports she was tested and confirmed for factor V Leiden mutation about 15 years ago by her OB/GYN physician.  However she was  "never seen a hematologist.      Recently she also had a genetic study from the \"[employer]\" company and also confirmed heterozygous factor V Leiden mutation.    Patient reports that she herself has never had any personal history of thrombosis. She does have a strong family history of factor V Leiden mutation involving multiple family members on the paternal side. Her paternal grandfather had DVT and was found to have factor V Leiden mutation.  Her sister also had factor V Leiden mutation.  Patient also reports multiple relatives on her father's side having factor V Leiden mutation including aunt and cousins.     Patient reports she was never pregnant. She does not smoke at all. She is a very rare social drinker.     Patient reports she has significant arthralgia in her knees, mostly on the right knee. She is seeking orthopedic evaluation and possible knee replacement. She previously had arthroscopic right knee surgery for a torn meniscus. She apparently did well with that without blood clots.     Patient is not having other complaints.    Patient is not on any anticoagulation medicine, nor aspirin. She currently is only taking Protonix and diclofenac.           MEDICATIONS    Current Outpatient Medications:     diclofenac (VOLTAREN) 75 MG EC tablet, Take 1 tablet by mouth 2 (Two) Times a Day. With food, Disp: 60 tablet, Rfl: 1    pantoprazole (PROTONIX) 40 MG EC tablet, TAKE 1 TABLET BY MOUTH DAILY, Disp: 90 tablet, Rfl: 0    ALLERGIES:     Allergies   Allergen Reactions    Nickel Rash       SOCIAL HISTORY:       Social History     Socioeconomic History    Marital status: Single   Tobacco Use    Smoking status: Never    Smokeless tobacco: Never   Vaping Use    Vaping Use: Never used   Substance and Sexual Activity    Alcohol use: Never    Drug use: Never    Sexual activity: Never   Working in construction, desk job.         FAMILY HISTORY:  Family History   Problem Relation Age of Onset    Breast cancer Mother     " "Hypertension Mother     Arthritis Mother         Hip replacement at 61    Cancer Mother         Breast cancer age 38    Depression Mother     Heart disease Mother         AFIB    Broken bones Mother     Hypertension Father     Factor V Leiden deficiency Father     Clotting disorder Father         Factor five    Other Father         Factor V    Factor V Leiden deficiency Sister     Clotting disorder Sister         Factor five    Other Sister         Factor V    Other Brother         Crohn's    Factor V Leiden deficiency Paternal Aunt     Breast cancer Maternal Grandmother     Cancer Maternal Grandmother         Breast cancer age 50    Heart disease Maternal Grandmother         Heart valve repair    Mental illness Maternal Grandmother         Dementia    Diabetes Maternal Grandfather     Arthritis Maternal Grandfather     Heart disease Maternal Grandfather         Heart failure, Heart attack and valve replacement    Cancer Maternal Grandfather         Lung cancer    Stroke Maternal Grandfather         Cause of death    Alzheimer's disease Paternal Grandmother     Mental illness Paternal Grandmother         Alzheimers    Factor V Leiden deficiency Paternal Grandfather     Hearing loss Paternal Grandfather         meniers    Heart disease Paternal Grandfather         Heart valve replacement    Clotting disorder Paternal Grandfather         Factor five    Other Paternal Grandfather         Factor V   Mother had breast cancer at age 38, doing well.  MGM breast cancer at age 50, doing well currently age 87 in 2023.   Paternal grandfather age 90, diagnosed lung cancer got radiation, physically active, he has heart attack and DVT with factor V Leiden.       REVIEW OF SYSTEMS:  Review of Systems  See HPI           Vitals:    01/23/24 1054   BP: 137/84   Pulse: 72   Resp: 18   Temp: 98.2 °F (36.8 °C)   TempSrc: Temporal   SpO2: 99%   Weight: 111 kg (245 lb 8 oz)   Height: 167.6 cm (65.98\")   PainSc: 0-No pain         1/23/2024 "    10:57 AM   Current Status   ECOG score 1      PHYSICAL EXAM: 1/23/2024  GENERAL:  Well-developed, well-nourished in no acute distress.  Patient ambulating with a cane.    Orientated to time place and the people.  SKIN:  Warm, dry without rashes, purpura or petechiae.  HEENT:  Normocephalic.   CHEST: Normal respiratory effort.  Lungs clear to auscultation. Good airflow.  CARDIAC:  Regular rate and rhythm without murmurs. Normal S1,S2.  ABDOMEN:  Soft, nontender with no organomegaly or masses.  Bowel sounds normal.  EXTREMITIES:   No lower leg edema or tenderness    RECENT LABS:        WBC   Date Value Ref Range Status   01/23/2024 7.93 3.40 - 10.80 10*3/mm3 Final   11/29/2023 9.24 3.40 - 10.80 10*3/mm3 Final   10/16/2023 9.81 3.40 - 10.80 10*3/mm3 Final   09/05/2023 9.08 3.40 - 10.80 10*3/mm3 Final   04/14/2023 9.03 3.40 - 10.80 10*3/mm3 Final   03/13/2023 12.4 (H) 3.4 - 10.8 x10E3/uL Final   05/11/2022 11.45 (H) 3.40 - 10.80 10*3/mm3 Final   02/04/2021 7.30 3.40 - 10.80 10*3/mm3 Final   02/22/2019 8.53 3.40 - 10.80 10*3/mm3 Final   01/04/2018 8.94 4.50 - 10.70 10*3/mm3 Final   12/28/2017 10.95 (H) 4.50 - 10.70 10*3/mm3 Final   10/26/2016 7.68 4.50 - 10.70 10*3/mm3 Final   06/27/2016 8.40 4.50 - 10.70 10*3/mm3 Final   03/06/2015 7.57 4.50 - 10.70 K/Cumm Final   03/06/2015 6-10 (A) 0 - 5 /hpf Final     Hemoglobin   Date Value Ref Range Status   01/23/2024 12.1 12.0 - 15.9 g/dL Final   11/29/2023 11.5 (L) 12.0 - 15.9 g/dL Final   10/16/2023 11.6 (L) 12.0 - 15.9 g/dL Final   09/05/2023 11.4 (L) 12.0 - 15.9 g/dL Final   04/14/2023 12.2 12.0 - 15.9 g/dL Final   03/13/2023 12.0 11.1 - 15.9 g/dL Final   05/11/2022 12.3 12.0 - 15.9 g/dL Final   02/04/2021 13.1 12.0 - 15.9 g/dL Final   02/22/2019 11.2 (L) 12.0 - 15.9 g/dL Final   01/04/2018 12.1 11.9 - 15.5 g/dL Final   12/28/2017 12.4 11.9 - 15.5 g/dL Final   10/26/2016 11.9 11.9 - 15.5 g/dL Final   06/27/2016 11.9 11.9 - 15.5 g/dL Final   03/06/2015 12.2 11.9 - 15.5 g/dL  Final     Platelets   Date Value Ref Range Status   01/23/2024 321 140 - 450 10*3/mm3 Final   11/29/2023 311 140 - 450 10*3/mm3 Final   10/16/2023 174 140 - 450 10*3/mm3 Final   09/05/2023 354 140 - 450 10*3/mm3 Final   04/14/2023 208 140 - 450 10*3/mm3 Final   03/13/2023 373 150 - 450 x10E3/uL Final   05/11/2022 341 140 - 450 10*3/mm3 Final   02/04/2021 279 140 - 450 10*3/mm3 Final   02/22/2019 259 140 - 450 10*3/mm3 Final   01/04/2018 301 140 - 500 10*3/mm3 Final   12/28/2017 298 140 - 500 10*3/mm3 Final   10/26/2016 322 140 - 500 10*3/mm3 Final   06/27/2016 320 140 - 500 10*3/mm3 Final   03/06/2015 299 140 - 500 K/Cumm Final       Assessment & Plan     ASSESSMENT:    Right knee status post partial replacement on 09/15/2023.   Patient reports today on 10/16/2023 that she was tolerating prophylactic anticoagulation and finished 1 month's Eliquis yesterday. She reports improving right knee edema. Today, she started taking ibuprofen for pain and feels a lot better now. (She was not taking ibuprofen when she was taking Eliquis due to increased risk for bleeding. )  The patient is planning to have left knee partial replacement also which is scheduled on 12/15/2023. She was asking me whether she could take ibuprofen when she is on Eliquis. I think if she only takes up to three times a day 400 mg each, that should be safe, however, she still needs to watch out for signs of bleeding.    2.   Hypercoagulable status with heterozygous factor V Leiden mutation.   She herself has no previous episodes of venous thrombosis or arterial thrombosis. She does have a strong family history on her father's side with multiple family members having the same factor V Leiden mutation and DVT/PEs.   Patient reports that she has significant arthralgia involving the knees mostly in the right knee and she is seeking orthopedic evaluation, and a possibly seeking a knee replacement. I discussed with the patient that currently she is not having  any problem with thrombosis, so I will not give her any anticoagulation medicine. Nevertheless, if she does have surgery, I recommend the patient be given prophylactic anticoagulation for 1 month. I think something such as Eliquis 2.5 mg twice a day for 30 days would be a perfect strategy post-op for preventing thrombosis. I would like to see her about 1 month after her surgery. Since we do not have the timing of her surgery at this point, I would not make her appointment as of yet. She will call us.   I also recommend if she travels, she needs to get up about every 60 to 90 minutes to walk around, either stop the car or if she is on a plane to get up and walk around it. She voiced understanding. I think it is also reasonable for her to take a baby aspirin 81 mg daily when she goes on long distance travel.  This patient presented today for reevaluation on 10/16/2023 and she has been tolerating well with low-dose Eliquis anticoagulation with no bleeding or bruising. No evidence for pulmonary emboli or DVT.  Patient completed 1 month of Eliquis postoperatively without any issues with bleeding.  No evidence for PE or DVT.  She is aware of the precautions to take if she travels, which we have reviewed again today.    3.   Mild anemia, with previous evidence of iron deficiency.  Patient reports she has been donating blood regularly about every 2 months. She has been taking multivitamin which contains iron. I offered her to check her iron level today, however, she said she preferred not to. She will continue taking oral vitamin and iron supplement. I advised the patient not to donate the blood before the left knee replacement. Patient voiced understanding. I also on this patient had a previous evidence of folate deficiency in 2019 and that she should also take folic acid, she currently takes a multivitamin, which contains 400 mg of folic acid.  1/23/2024 hemoglobin has improved to 12.1.      PLAN:   Follow-up with surgeon  as scheduled today.  Continue physical therapy.  Continue oral multivitamin with iron.  Patient was advised to watch her Tylenol usage given slight increase in liver function.  She will follow-up with her primary care provider in a few months for physical, and I recommend she have her liver function rechecked at that time.  Follow-up in 6 months with Dr. Hastings with repeat labs.  Call/ return sooner should the patient develop any new concerns or problems.        CLINTON Mohr       CC:   CLINTON Vega MD, orthopedic surgeon, [place], [place]         Transcribed from ambient dictation for CLINTON Mohr by Rosa Pereira.  10/16/23   20:34 EDT    Patient or patient representative verbalized consent to the visit recording.    I have personally performed the services described in this document as transcribed by the above individual, and it is both accurate and complete.      Katrin Hastings MD PhD

## 2024-01-25 ENCOUNTER — TREATMENT (OUTPATIENT)
Dept: PHYSICAL THERAPY | Facility: CLINIC | Age: 39
End: 2024-01-25
Payer: COMMERCIAL

## 2024-01-25 DIAGNOSIS — R26.2 DIFFICULTY IN WALKING: ICD-10-CM

## 2024-01-25 DIAGNOSIS — M17.12 UNILATERAL PRIMARY OSTEOARTHRITIS, LEFT KNEE: Primary | ICD-10-CM

## 2024-01-25 DIAGNOSIS — M25.562 ACUTE PAIN OF LEFT KNEE: ICD-10-CM

## 2024-01-25 DIAGNOSIS — M25.662 IMPAIRED RANGE OF MOTION OF LEFT KNEE: ICD-10-CM

## 2024-01-25 PROCEDURE — 97530 THERAPEUTIC ACTIVITIES: CPT | Performed by: PHYSICAL THERAPIST

## 2024-01-25 PROCEDURE — 97110 THERAPEUTIC EXERCISES: CPT | Performed by: PHYSICAL THERAPIST

## 2024-01-25 NOTE — PROGRESS NOTES
Physical Therapy Treatment Note    [address]   [place] [address]  [phone] (p)  [phone] (f)    VISIT#: 10     Diagnosis Plan   1. Unilateral primary osteoarthritis, left knee        2. Difficulty in walking        3. Impaired range of motion of left knee        4. Acute pain of left knee          Subjective   Sena Min reports: had in person follow up with surgeon in [place]. She reports MD was pleased with her progress, not concerned with popping/patellar tracking; was told is common with partial knee replacements and OK to cotninue weight machines as long as painfree. Pt notes difficulty getting up from low surface.    Objective     See Exercise, Manual, and Modality Logs for complete treatment.     Patient Education: correct form with partial lunge.     Assessment/Plan Modified exercises as needed to minimize sunluxation and crepitus L. Pt with limited power to return to neutral from forward lunge bilat.       Progress per Plan of Care Wall sit. Progress weight on leg press.             Timed:         Manual Therapy:         mins  70418;     Therapeutic Exercise:     17    mins  60034;     Neuromuscular Ramana:   5     mins  41638;    Therapeutic Activity:     8     mins  45156;     Gait Training:           mins  61306;     Ultrasound:          mins  71816;    Ionto                                   mins   58467  Self Care                            mins   86819  Canalith Repos                   mins  4209  Aquatic                               mins 76348    Un-Timed:  Electrical Stimulation:         mins  68565 ( );  Dry Needling          mins self-pay  Traction          mins 02356  Low Eval          Mins  07169  Mod Eval          Mins  31523  High Eval                            Mins  67923  Re-Eval                               mins  10830    Timed Treatment:   30   mins   Total Treatment:     30   mins    Samantha Helm PT, DPT  IN LICENCE: [id number]

## 2024-01-26 ENCOUNTER — TREATMENT (OUTPATIENT)
Dept: PHYSICAL THERAPY | Facility: CLINIC | Age: 39
End: 2024-01-26
Payer: COMMERCIAL

## 2024-01-26 DIAGNOSIS — M25.562 ACUTE PAIN OF LEFT KNEE: ICD-10-CM

## 2024-01-26 DIAGNOSIS — R26.2 DIFFICULTY IN WALKING: ICD-10-CM

## 2024-01-26 DIAGNOSIS — M17.12 UNILATERAL PRIMARY OSTEOARTHRITIS, LEFT KNEE: Primary | ICD-10-CM

## 2024-01-26 DIAGNOSIS — M25.662 IMPAIRED RANGE OF MOTION OF LEFT KNEE: ICD-10-CM

## 2024-01-26 NOTE — PROGRESS NOTES
Physical Therapy Treatment  Note  [address] [facility] [place]   [address]     Diagnosis Plan   1. Unilateral primary osteoarthritis, left knee        2. Difficulty in walking        3. Impaired range of motion of left knee        4. Acute pain of left knee            VISIT#: 11    Subjective   Sena Min reports: minimal pain upon arrival to therapy.  Patient reported her knee cap has not been shifting as much today but was yesterday.  Per patient, surgeon assessed and stated this movement of knee cap was normal and shoulder resolved with strengthening and when swelling decreases.      Objective     See Exercise, Manual, and Modality Logs for complete treatment.     Patient Education:    Goals- Recent progress note completed on 1/4/24  Plan Goals: STG: to be met within 10 visits   1. Pt to be (I) with initial HEP for self management of impairments: MET  2. Pt will be mod (I) with sit <> stand, SPS and supine <> sit transfers MET  3. Pt will demonstrate (L) knee AROM : 0-5-110 deg for improved ability to perform transitions and transfers. PROGRESSING, see goniometric measurements above  4. Pt will report highest level of pain as 4/10 for improved tolerance to activity and improved restorative sleep pattern; MET, highest level of pain in the last 3 days = 3/10 per patient report        LTG: to be met by DC - 20 visits  1. Pt to be (I) with finalized HEP to maximize outcomes  2. Pt to report improved function per LEFS to greater than 80% for improved function   3. Pt will demonstrate (L) knee flex/ext and (L) hip flex/abd/ext = 5/5 for improved function: PROGRESSING, see objective findings section   4. Pt will have ability to ambulate 15 minutes or greater without AD for community mobility and ability to return to work on-site. Progressing, pt has progressed to use of cane versus walker    Assessment/Plan  - Focus on incorporating hip strength in conjunction with knee strength, for example hip add  "squeeze during wall sit and hip abd with tband around knees during less press activity .  Pt reported less \" shifting\" of knee performing activities in this manner.       Progress per Plan of Care            Timed:         Manual Therapy:    0     mins  95039;     Therapeutic Exercise:    30     mins  36451;     Neuromuscular Ramana:    15    mins  71809;    Therapeutic Activity:     0     mins  62282;     Gait Trainin     mins  37308;     Ultrasound:     0     mins  74436;    Ionto                               0    mins   85813  Self Care                       0     mins   83810  Canalith Repos               0    mins  4209  Aquatic                          0     mins 34213    Un-Timed:  Electrical Stimulation:    0     mins  95378 ( );  Dry Needling     0     mins self-pay  Traction     0     mins 11931  Low Eval     0     Mins  42941  Mod Eval     0     Mins  97980  High Eval                       0     Mins  80591  Re-Eval                           0    mins  64747    Timed Treatment:   45   mins   Total Treatment:     45   mins    Taya Gonsalez, PT PT, DPT, [id number]      "

## 2024-01-29 ENCOUNTER — TREATMENT (OUTPATIENT)
Dept: PHYSICAL THERAPY | Facility: CLINIC | Age: 39
End: 2024-01-29
Payer: COMMERCIAL

## 2024-01-29 DIAGNOSIS — M17.12 UNILATERAL PRIMARY OSTEOARTHRITIS, LEFT KNEE: Primary | ICD-10-CM

## 2024-01-29 DIAGNOSIS — M25.562 ACUTE PAIN OF LEFT KNEE: ICD-10-CM

## 2024-01-29 DIAGNOSIS — M25.662 IMPAIRED RANGE OF MOTION OF LEFT KNEE: ICD-10-CM

## 2024-01-29 DIAGNOSIS — R26.2 DIFFICULTY IN WALKING: ICD-10-CM

## 2024-01-29 PROCEDURE — 97110 THERAPEUTIC EXERCISES: CPT | Performed by: PHYSICAL THERAPIST

## 2024-01-29 PROCEDURE — 97014 ELECTRIC STIMULATION THERAPY: CPT | Performed by: PHYSICAL THERAPIST

## 2024-01-29 NOTE — PROGRESS NOTES
Physical Therapy Daily Treatment Note    [facility], [place]      Patient: Sena Min   : 1985  Referring practitioner: Law Ambrosio MD  Date of Initial Visit: Type: THERAPY  Noted: 2023  Today's Date: 2024  Patient seen for 12 sessions       Visit Diagnoses:    ICD-10-CM ICD-9-CM   1. Unilateral primary osteoarthritis, left knee  M17.12 715.16   2. Difficulty in walking  R26.2 719.7   3. Impaired range of motion of left knee  M25.662 719.56   4. Acute pain of left knee  M25.562 719.46       Subjective Evaluation    History of Present Illness    Subjective comment: shifting and popping in knee is getting better. Avoiding long arc quads. KT tape didn't help too much but willing to try more aggressive support today     Objective   See Exercise, Manual, and Modality Logs for complete treatment.       Assessment & Plan       Assessment  Assessment details: Tried 4 strips of KT tape along lateral knee but c/o skin pulling vs. Patella pain. Took down to 2 strips and better. Could try applying at 60 degrees vs. 30 next time?    Did well with shifting as long as more WB during step ups. Did well with mini wall squats and wall sits today.   Added another pound to SLR.    Changed abd ex to adduction due to lateral pull on patella.           Timed:         Manual Therapy:         mins  85693;     Therapeutic Exercise:    30     mins  37697;     Neuromuscular Ramana:        mins  22531;    Therapeutic Activity:          mins  59437;     Gait Training:           mins  48425;     Ultrasound:          mins  38368;    Ionto                                   mins   64949  Self Care                            mins   72177  Canalith Repos         mins 00229  Work hardening   __   min 03194/61619      Un-Timed:  Electrical Stimulation:    15     mins  96874 ( );  Dry Needling          mins self-pay  Traction          mins 21874      Timed Treatment:   30   mins   Total Treatment:     45    mins    Zorre Zeno Kimura, PT  [place] License: [id number]    [place] License:  [id number]

## 2024-01-30 ENCOUNTER — TREATMENT (OUTPATIENT)
Dept: PHYSICAL THERAPY | Facility: CLINIC | Age: 39
End: 2024-01-30
Payer: COMMERCIAL

## 2024-01-30 DIAGNOSIS — M17.12 UNILATERAL PRIMARY OSTEOARTHRITIS, LEFT KNEE: Primary | ICD-10-CM

## 2024-01-30 DIAGNOSIS — M25.662 IMPAIRED RANGE OF MOTION OF LEFT KNEE: ICD-10-CM

## 2024-01-30 DIAGNOSIS — M25.562 ACUTE PAIN OF LEFT KNEE: ICD-10-CM

## 2024-01-30 DIAGNOSIS — R26.2 DIFFICULTY IN WALKING: ICD-10-CM

## 2024-01-30 NOTE — PROGRESS NOTES
Physical Therapy Treatment  Note  [address] [place]   [address]     Diagnosis Plan   1. Unilateral primary osteoarthritis, left knee        2. Difficulty in walking        3. Impaired range of motion of left knee        4. Acute pain of left knee            VISIT#: 13    Subjective   Sena Min reports: no new complaints this date.       Objective     See Exercise, Manual, and Modality Logs for complete treatment.     Patient Education:    Goals- Recent progress note completed on 1/4/24  Plan Goals: STG: to be met within 10 visits   1. Pt to be (I) with initial HEP for self management of impairments: MET  2. Pt will be mod (I) with sit <> stand, SPS and supine <> sit transfers MET  3. Pt will demonstrate (L) knee AROM : 0-5-110 deg for improved ability to perform transitions and transfers. PROGRESSING, see goniometric measurements above  4. Pt will report highest level of pain as 4/10 for improved tolerance to activity and improved restorative sleep pattern; MET, highest level of pain in the last 3 days = 3/10 per patient report        LTG: to be met by DC - 20 visits  1. Pt to be (I) with finalized HEP to maximize outcomes  2. Pt to report improved function per LEFS to greater than 80% for improved function   3. Pt will demonstrate (L) knee flex/ext and (L) hip flex/abd/ext = 5/5 for improved function: PROGRESSING, see objective findings section   4. Pt will have ability to ambulate 15 minutes or greater without AD for community mobility and ability to return to work on-site. Progressing, pt has progressed to use of cane versus walker  Assessment/Plan  - Focused on hip strengthening this date to promote improved stability and gait mechanics  - Performed Russian e-stim during seated isometric knee extension into ball this date to promote improved quad contraction.    Progress per Plan of Care            Timed:         Manual Therapy:    0     mins  80467;     Therapeutic Exercise:      40    mins  52118;     Neuromuscular Ramana:    15    mins  97168;    Therapeutic Activity:     0     mins  62716;     Gait Trainin     mins  08918;     Ultrasound:     0     mins  03510;    Ionto                               0    mins   92345  Self Care                       0     mins   99169  Canalith Repos               0    mins  4209  Aquatic                          0     mins 41620    Un-Timed:  Electrical Stimulation:    0     mins  66688 ( );  Dry Needling     0     mins self-pay  Traction     0     mins 14354  Low Eval     0     Mins  01583  Mod Eval     0     Mins  11934  High Eval                       0     Mins  08544  Re-Eval                           0    mins  70906    Timed Treatment:   55   mins   Total Treatment:     55   mins    Taya Gonsalez, PT PT, DPT, [id number]

## 2024-02-01 ENCOUNTER — TREATMENT (OUTPATIENT)
Dept: PHYSICAL THERAPY | Facility: CLINIC | Age: 39
End: 2024-02-01
Payer: COMMERCIAL

## 2024-02-01 DIAGNOSIS — M25.562 ACUTE PAIN OF LEFT KNEE: ICD-10-CM

## 2024-02-01 DIAGNOSIS — M25.662 IMPAIRED RANGE OF MOTION OF LEFT KNEE: ICD-10-CM

## 2024-02-01 DIAGNOSIS — R26.2 DIFFICULTY IN WALKING: ICD-10-CM

## 2024-02-01 DIAGNOSIS — M17.12 UNILATERAL PRIMARY OSTEOARTHRITIS, LEFT KNEE: Primary | ICD-10-CM

## 2024-02-01 NOTE — PROGRESS NOTES
Physical Therapy Treatment  Note  [address] [place]   [address]     Diagnosis Plan   1. Unilateral primary osteoarthritis, left knee        2. Difficulty in walking        3. Impaired range of motion of left knee        4. Acute pain of left knee            VISIT#: 14    Subjective   Sena Min reports: no new complaints this date. Denied pain upon arrival to therapy.  Reported patellar tracking is improving.       Objective     See Exercise, Manual, and Modality Logs for complete treatment.     Patient Education:    Goals- Recent progress note completed on 1/4/24  Plan Goals: STG: to be met within 10 visits   1. Pt to be (I) with initial HEP for self management of impairments: MET  2. Pt will be mod (I) with sit <> stand, SPS and supine <> sit transfers MET  3. Pt will demonstrate (L) knee AROM : 0-5-110 deg for improved ability to perform transitions and transfers. PROGRESSING, see goniometric measurements above  4. Pt will report highest level of pain as 4/10 for improved tolerance to activity and improved restorative sleep pattern; MET, highest level of pain in the last 3 days = 3/10 per patient report        LTG: to be met by DC - 20 visits  1. Pt to be (I) with finalized HEP to maximize outcomes  2. Pt to report improved function per LEFS to greater than 80% for improved function   3. Pt will demonstrate (L) knee flex/ext and (L) hip flex/abd/ext = 5/5 for improved function: PROGRESSING, see objective findings section   4. Pt will have ability to ambulate 15 minutes or greater without AD for community mobility and ability to return to work on-site. Progressing, pt has progressed to use of cane versus walker  Assessment/Plan  - Pt with reduced c/o patellar shifting/poor tracking.  Patient reported this is improving overall.   - Performed Russian e-stim during seated isometric knee extension into ball this date to promote improved quad contraction.    Progress per Plan of Care             Timed:         Manual Therapy:    0     mins  60874;     Therapeutic Exercise:      40   mins  06006;     Neuromuscular Ramana:    15    mins  75128;    Therapeutic Activity:     0     mins  81840;     Gait Trainin     mins  27424;     Ultrasound:     0     mins  81693;    Ionto                               0    mins   78884  Self Care                       0     mins   96445  Canalith Repos               0    mins  4209  Aquatic                          0     mins 10823    Un-Timed:  Electrical Stimulation:    0     mins  73283 ( );  Dry Needling     0     mins self-pay  Traction     0     mins 89016  Low Eval     0     Mins  82919  Mod Eval     0     Mins  40023  High Eval                       0     Mins  12452  Re-Eval                           0    mins  88497    Timed Treatment:   55   mins   Total Treatment:     55   mins    Taya Gonsalez, PT PT, DPT, [id number]

## 2024-02-06 ENCOUNTER — TREATMENT (OUTPATIENT)
Dept: PHYSICAL THERAPY | Facility: CLINIC | Age: 39
End: 2024-02-06
Payer: COMMERCIAL

## 2024-02-06 DIAGNOSIS — M25.562 ACUTE PAIN OF LEFT KNEE: ICD-10-CM

## 2024-02-06 DIAGNOSIS — M17.12 UNILATERAL PRIMARY OSTEOARTHRITIS, LEFT KNEE: ICD-10-CM

## 2024-02-06 DIAGNOSIS — M25.662 IMPAIRED RANGE OF MOTION OF LEFT KNEE: ICD-10-CM

## 2024-02-06 DIAGNOSIS — R26.2 DIFFICULTY IN WALKING: Primary | ICD-10-CM

## 2024-02-06 NOTE — PROGRESS NOTES
"Physical Therapy Treatment  Note  [address] [place]   [address]     Diagnosis Plan   1. Difficulty in walking        2. Unilateral primary osteoarthritis, left knee        3. Impaired range of motion of left knee        4. Acute pain of left knee            VISIT#: 15    Subjective   Sena Min reports: no pain upon arrival to therapy.  Patient reported knee has been \" popping\" slight more but believes it may be more swollen from increased walking.       Objective     See Exercise, Manual, and Modality Logs for complete treatment.     Patient Education:    Goals- Recent progress note completed on 2/1/24  Plan Goals: STG: to be met within 10 visits   1. Pt to be (I) with initial HEP for self management of impairments: MET  2. Pt will be mod (I) with sit <> stand, SPS and supine <> sit transfers MET  3. Pt will demonstrate (L) knee AROM : 0-5-110 deg for improved ability to perform transitions and transfers. PROGRESSING, see goniometric measurements above  4. Pt will report highest level of pain as 4/10 for improved tolerance to activity and improved restorative sleep pattern; MET, highest level of pain in the last 3 days = 3/10 per patient report        LTG: to be met by DC - 20 visits  1. Pt to be (I) with finalized HEP to maximize outcomes  2. Pt to report improved function per LEFS to greater than 80% for improved function   3. Pt will demonstrate (L) knee flex/ext and (L) hip flex/abd/ext = 5/5 for improved function: PROGRESSING, see objective findings section   4. Pt will have ability to ambulate 15 minutes or greater without AD for community mobility and ability to return to work on-site. Progressing, pt has progressed to use of cane versus walker    Assessment/Plan  - Patient denied elevated pain levels throughout and following treatment session  - Pt has been able to ambulate a total of 2 miles with rest periods secondary to fatigue.  Pt was not limited by pain per her " report    Progress per Plan of Care            Timed:         Manual Therapy:    0     mins  04789;     Therapeutic Exercise:    30     mins  85332;     Neuromuscular Ramana:    15    mins  35868;    Therapeutic Activity:     0     mins  29452;     Gait Trainin     mins  71120;     Ultrasound:     0     mins  14878;    Ionto                               0    mins   42673  Self Care                       0     mins   84363  Canalith Repos               0    mins  4209  Aquatic                          0     mins 09240    Un-Timed:  Electrical Stimulation:    0     mins  28310 ( );  Dry Needling     0     mins self-pay  Traction     0     mins 16364  Low Eval     0     Mins  29781  Mod Eval     0     Mins  22470  High Eval                       0     Mins  06065  Re-Eval                           0    mins  39707    Timed Treatment:   45   mins   Total Treatment:     45   mins    Taya Gonsalez, PT PT, DPT, [id number]

## 2024-02-08 ENCOUNTER — TREATMENT (OUTPATIENT)
Dept: PHYSICAL THERAPY | Facility: CLINIC | Age: 39
End: 2024-02-08
Payer: COMMERCIAL

## 2024-02-08 DIAGNOSIS — M25.662 IMPAIRED RANGE OF MOTION OF LEFT KNEE: ICD-10-CM

## 2024-02-08 DIAGNOSIS — M25.562 ACUTE PAIN OF LEFT KNEE: ICD-10-CM

## 2024-02-08 DIAGNOSIS — M17.12 UNILATERAL PRIMARY OSTEOARTHRITIS, LEFT KNEE: ICD-10-CM

## 2024-02-08 DIAGNOSIS — R26.2 DIFFICULTY IN WALKING: Primary | ICD-10-CM

## 2024-02-08 NOTE — PROGRESS NOTES
Physical Therapy  Discharge  [address] [place], [place] [address], Phone: [phone]    Patient: Sena Min   : 1985  Diagnosis/ICD-10 Code:  Difficulty in walking [R26.2]  Referring practitioner: Law Ambrosio MD  Date of Initial Visit: Type: THERAPY  Noted: 2023  Today's Date: 2024  Patient seen for 16 sessions      Subjective:   Visit Diagnosis:    ICD-10-CM ICD-9-CM   1. Difficulty in walking  R26.2 719.7   2. Unilateral primary osteoarthritis, left knee  M17.12 715.16   3. Impaired range of motion of left knee  M25.662 719.56   4. Acute pain of left knee  M25.562 719.46       Sena Min reports: denied pain upon arrival to therapy  Subjective Questionnaire: LEFS: 63/80 indicates 21.25% impairment  Clinical Progress: improved  Home Program Compliance: Yes  Treatment has included: therapeutic exercise, neuromuscular re-education, manual therapy, electrical stimulation, and cryotherapy    Subjective   Pain  Current pain ratin  At best pain ratin  At worst pain ratin    Objective   (L) knee flexion AROM : 122 degrees (seated)   (L) knee extension AROM : 0 degrees    Strength/Myotome Testing      Left Hip   Planes of Motion   Flexion: 5  Abduction: 5  Extension : 5     Right Hip   Planes of Motion   Flexion: 5  Abduction:5  Extension : 5     Left Knee   Flexion: 5  Extension: 5  Quadriceps contraction: fair      Right Knee   Flexion: 5  Extension: 5  Quadriceps contraction: fair    Assessment/Plan  - Pt has met all but 1 goal for this episode of therapy and nearly met remaining goal  - Pt is now (I) with daily tasks and mobility  - In agreement to D/C from PT this date with HEP    Progress toward previous goals: All Met    Goals- Recent progress note completed on 24  Plan Goals: STG: to be met within 10 visits   1. Pt to be (I) with initial HEP for self management of impairments: MET  2. Pt will be mod (I) with sit <> stand, SPS and supine <> sit  transfers MET  3. Pt will demonstrate (L) knee AROM : 0-5-110 deg for improved ability to perform transitions and transfers. MET , see goniometric measurements above  4. Pt will report highest level of pain as 4/10 for improved tolerance to activity and improved restorative sleep pattern; MET, highest level of pain in the last 3 days = 3/10 per patient report        LTG: to be met by DC - 20 visits  1. Pt to be (I) with finalized HEP to maximize outcomes MET  2. Pt to report improved function per LEFS to greater than 80% for improved function PROGRESSING, nearly met, see scoring above  3. Pt will demonstrate (L) knee flex/ext and (L) hip flex/abd/ext = 5/5 for improved function: MET, see objective findings section   4. Pt will have ability to ambulate 15 minutes or greater without AD for community mobility and ability to return to work on-site.MET, pt is able to ambulate 1-2 miles total without AD with 1 -2  seated rest periods  Short-term goals (STG): 4/4  Long-term goals (LTG): 3/4      Recommendations: Discharge      Timed:         Manual Therapy:    0     mins  59283;     Therapeutic Exercise:    15     mins  12330;     Neuromuscular Ramana:    15    mins  94685;    Therapeutic Activity:     0     mins  50209;     Gait Trainin     mins  15105;     Ultrasound:     0     mins  59641;    Ionto                               0    mins   82780  Self Care                       0     mins   00406  Aquatic                          0     mins 54109      Un-Timed:  Electrical Stimulation:    0     mins  07323 ( );  Dry Needling     0     mins self-pay  Traction     0     mins 11869  Low Eval     0     Mins  18960  Mod Eval     0     Mins  32879  High Eval                       0     Mins  81243  Re-Eval                           0    mins  14545      Timed Treatment:   30   mins   Total Treatment:     30   mins      PT Signature: Taya Gonsalez, PT  [place] License #: [id number]

## 2024-02-08 NOTE — LETTER
Progress Note  2024  Law Ambrosio MD    Re: Sena CAMPBELL Pérezalf  ________________________________________________________________    Physical Therapy  Discharge  [address] [place], [place] [address], Phone: [phone]    Patient: Sena CAMPBELL Pako   : 1985  Diagnosis/ICD-10 Code:  Difficulty in walking [R26.2]  Referring practitioner: Law Ambrosio MD  Date of Initial Visit: Type: THERAPY  Noted: 2023  Today's Date: 2024  Patient seen for 16 sessions      Subjective:   Visit Diagnosis:    ICD-10-CM ICD-9-CM   1. Difficulty in walking  R26.2 719.7   2. Unilateral primary osteoarthritis, left knee  M17.12 715.16   3. Impaired range of motion of left knee  M25.662 719.56   4. Acute pain of left knee  M25.562 719.46       Sena Pako reports: denied pain upon arrival to therapy  Subjective Questionnaire: LEFS: 63/80 indicates 21.25% impairment  Clinical Progress: improved  Home Program Compliance: Yes  Treatment has included: therapeutic exercise, neuromuscular re-education, manual therapy, electrical stimulation, and cryotherapy    Subjective   Pain  Current pain ratin  At best pain ratin  At worst pain ratin    Objective   (L) knee flexion AROM : 122 degrees (seated)   (L) knee extension AROM : 0 degrees    Strength/Myotome Testing      Left Hip   Planes of Motion   Flexion: 5  Abduction: 5  Extension : 5     Right Hip   Planes of Motion   Flexion: 5  Abduction:5  Extension : 5     Left Knee   Flexion: 5  Extension: 5  Quadriceps contraction: fair      Right Knee   Flexion: 5  Extension: 5  Quadriceps contraction: fair    Assessment/Plan  - Pt has met all but 1 goal for this episode of therapy and nearly met remaining goal  - Pt is now (I) with daily tasks and mobility  - In agreement to D/C from PT this date with HEP    Progress toward previous goals: All Met    Goals- Recent progress note completed on 24  Plan Goals: STG: to be met within 10  visits   1. Pt to be (I) with initial HEP for self management of impairments: MET  2. Pt will be mod (I) with sit <> stand, SPS and supine <> sit transfers MET  3. Pt will demonstrate (L) knee AROM : 0-5-110 deg for improved ability to perform transitions and transfers. MET , see goniometric measurements above  4. Pt will report highest level of pain as 4/10 for improved tolerance to activity and improved restorative sleep pattern; MET, highest level of pain in the last 3 days = 3/10 per patient report        LTG: to be met by DC - 20 visits  1. Pt to be (I) with finalized HEP to maximize outcomes MET  2. Pt to report improved function per LEFS to greater than 80% for improved function PROGRESSING, nearly met, see scoring above  3. Pt will demonstrate (L) knee flex/ext and (L) hip flex/abd/ext = 5/5 for improved function: MET, see objective findings section   4. Pt will have ability to ambulate 15 minutes or greater without AD for community mobility and ability to return to work on-site.MET, pt is able to ambulate 1-2 miles total without AD with 1 -2  seated rest periods  Short-term goals (STG): 4/4  Long-term goals (LTG): 3/4      Recommendations: Discharge      Timed:         Manual Therapy:    0     mins  69431;     Therapeutic Exercise:    15     mins  33693;     Neuromuscular Ramana:    15    mins  48610;    Therapeutic Activity:     0     mins  15063;     Gait Trainin     mins  87489;     Ultrasound:     0     mins  66369;    Ionto                               0    mins   13191  Self Care                       0     mins   71145  Aquatic                          0     mins 53252      Un-Timed:  Electrical Stimulation:    0     mins  64198 ( );  Dry Needling     0     mins self-pay  Traction     0     mins 85064  Low Eval     0     Mins  51912  Mod Eval     0     Mins  41895  High Eval                       0     Mins  23628  Re-Eval                           0    mins  94635      Timed  Treatment:   30   mins   Total Treatment:     30   mins      PT Signature: Taya Gonsalez, PT  [place] License #: [id number]      Thank you for this referral.

## 2024-02-08 NOTE — LETTER
Progress Note  2024  Law Ambrosio MD    Re: Sena CAMPBELL Pérezalf  ________________________________________________________________  Physical Therapy  Discharge  [address], [place] [address], Phone: [phone]    Patient: Sena CAMPBELL Pako   : 1985  Diagnosis/ICD-10 Code:  Difficulty in walking [R26.2]  Referring practitioner: Law Ambrosio MD  Date of Initial Visit: Type: THERAPY  Noted: 2023  Today's Date: 2024  Patient seen for 16 sessions      Subjective:   Visit Diagnosis:    ICD-10-CM ICD-9-CM   1. Difficulty in walking  R26.2 719.7   2. Unilateral primary osteoarthritis, left knee  M17.12 715.16   3. Impaired range of motion of left knee  M25.662 719.56   4. Acute pain of left knee  M25.562 719.46       Sena Pako reports: denied pain upon arrival to therapy  Subjective Questionnaire: LEFS: 63/80 indicates 21.25% impairment  Clinical Progress: improved  Home Program Compliance: Yes  Treatment has included: therapeutic exercise, neuromuscular re-education, manual therapy, electrical stimulation, and cryotherapy    Subjective   Pain  Current pain ratin  At best pain ratin  At worst pain ratin    Objective   (L) knee flexion AROM : 122 degrees (seated)   (L) knee extension AROM : 0 degrees    Strength/Myotome Testing      Left Hip   Planes of Motion   Flexion: 5  Abduction: 5  Extension : 5     Right Hip   Planes of Motion   Flexion: 5  Abduction:5  Extension : 5     Left Knee   Flexion: 5  Extension: 5  Quadriceps contraction: fair      Right Knee   Flexion: 5  Extension: 5  Quadriceps contraction: fair    Assessment/Plan  - Pt has met all but 1 goal for this episode of therapy and nearly met remaining goal  - Pt is now (I) with daily tasks and mobility  - In agreement to D/C from PT this date with HEP    Progress toward previous goals: All Met    Goals- Recent progress note completed on 24  Plan Goals: STG: to be met within 10  visits   1. Pt to be (I) with initial HEP for self management of impairments: MET  2. Pt will be mod (I) with sit <> stand, SPS and supine <> sit transfers MET  3. Pt will demonstrate (L) knee AROM : 0-5-110 deg for improved ability to perform transitions and transfers. MET , see goniometric measurements above  4. Pt will report highest level of pain as 4/10 for improved tolerance to activity and improved restorative sleep pattern; MET, highest level of pain in the last 3 days = 3/10 per patient report        LTG: to be met by DC - 20 visits  1. Pt to be (I) with finalized HEP to maximize outcomes MET  2. Pt to report improved function per LEFS to greater than 80% for improved function PROGRESSING, nearly met, see scoring above  3. Pt will demonstrate (L) knee flex/ext and (L) hip flex/abd/ext = 5/5 for improved function: MET, see objective findings section   4. Pt will have ability to ambulate 15 minutes or greater without AD for community mobility and ability to return to work on-site.MET, pt is able to ambulate 1-2 miles total without AD with 1 -2  seated rest periods  Short-term goals (STG): 4/4  Long-term goals (LTG): ***      Recommendations: Discharge      Timed:         Manual Therapy:    0     mins  02748;     Therapeutic Exercise:    15     mins  96788;     Neuromuscular Ramana:    15    mins  44717;    Therapeutic Activity:     0     mins  57112;     Gait Trainin     mins  22704;     Ultrasound:     0     mins  55363;    Ionto                               0    mins   17625  Self Care                       0     mins   80458  Aquatic                          0     mins 94130      Un-Timed:  Electrical Stimulation:    0     mins  90901 ( );  Dry Needling     0     mins self-pay  Traction     0     mins 14114  Low Eval     0     Mins  45895  Mod Eval     0     Mins  64931  High Eval                       0     Mins  07245  Re-Eval                           0    mins  26102      Timed  Treatment:   30   mins   Total Treatment:     30   mins      PT Signature: Taya Gonsalez, PT  [place] License #: [id number]     Thank you for this referral.

## 2024-03-06 DIAGNOSIS — M25.561 CHRONIC PAIN OF BOTH KNEES: ICD-10-CM

## 2024-03-06 DIAGNOSIS — G89.29 CHRONIC PAIN OF BOTH KNEES: ICD-10-CM

## 2024-03-06 DIAGNOSIS — M25.562 CHRONIC PAIN OF BOTH KNEES: ICD-10-CM

## 2024-03-08 RX ORDER — DICLOFENAC SODIUM 75 MG/1
75 TABLET, DELAYED RELEASE ORAL 2 TIMES DAILY WITH MEALS
Qty: 60 TABLET | Refills: 1 | Status: SHIPPED | OUTPATIENT
Start: 2024-03-08

## 2024-03-08 NOTE — TELEPHONE ENCOUNTER
Rx Refill Note  Requested Prescriptions     Pending Prescriptions Disp Refills    diclofenac (VOLTAREN) 75 MG EC tablet [Pharmacy Med Name: DICLOFENAC SOD EC 75 MG TAB] 60 tablet 1     Sig: TAKE 1 TABLET BY MOUTH TWICE A DAY WITH FOOD      Last office visit with prescribing clinician: 1/8/2024   Last telemedicine visit with prescribing clinician: Visit date not found   Next office visit with prescribing clinician: 4/29/2024       {TIP  Please add Last Relevant Lab Date if appropriate: 01/23/24                 Would you like a call back once the refill request has been completed: [] Yes [] No    If the office needs to give you a call back, can they leave a voicemail: [] Yes [] No    Sena Hernandez MA  03/08/24, 07:21 EST

## 2024-03-15 DIAGNOSIS — K21.9 GASTROESOPHAGEAL REFLUX DISEASE, UNSPECIFIED WHETHER ESOPHAGITIS PRESENT: ICD-10-CM

## 2024-03-15 NOTE — TELEPHONE ENCOUNTER
Rx Refill Note  Requested Prescriptions     Pending Prescriptions Disp Refills    pantoprazole (PROTONIX) 40 MG EC tablet [Pharmacy Med Name: PANTOPRAZOLE SOD DR 40 MG TAB] 90 tablet 1     Sig: TAKE 1 TABLET BY MOUTH EVERY DAY      Last office visit with prescribing clinician: 1/8/2024   Last telemedicine visit with prescribing clinician: Visit date not found   Next office visit with prescribing clinician: 4/29/2024                         Would you like a call back once the refill request has been completed: [] Yes [] No    If the office needs to give you a call back, can they leave a voicemail: [] Yes [] No    Foreign Patel MA  03/15/24, 13:49 EDT

## 2024-03-18 RX ORDER — PANTOPRAZOLE SODIUM 40 MG/1
40 TABLET, DELAYED RELEASE ORAL DAILY
Qty: 90 TABLET | Refills: 1 | Status: SHIPPED | OUTPATIENT
Start: 2024-03-18 | End: 2024-03-22 | Stop reason: SDUPTHER

## 2024-03-22 DIAGNOSIS — K21.9 GASTROESOPHAGEAL REFLUX DISEASE, UNSPECIFIED WHETHER ESOPHAGITIS PRESENT: ICD-10-CM

## 2024-03-25 RX ORDER — PANTOPRAZOLE SODIUM 40 MG/1
40 TABLET, DELAYED RELEASE ORAL DAILY
Qty: 90 TABLET | Refills: 1 | Status: SHIPPED | OUTPATIENT
Start: 2024-03-25

## 2024-03-25 NOTE — TELEPHONE ENCOUNTER
Rx Refill Note  Requested Prescriptions     Pending Prescriptions Disp Refills    pantoprazole (PROTONIX) 40 MG EC tablet 90 tablet 1     Sig: Take 1 tablet by mouth Daily.      Last office visit with prescribing clinician: 1/8/2024   Last telemedicine visit with prescribing clinician: Visit date not found   Next office visit with prescribing clinician: 4/29/2024                         Would you like a call back once the refill request has been completed: [] Yes [] No    If the office needs to give you a call back, can they leave a voicemail: [] Yes [] No    Charito Avilez Rep  03/25/24, 09:41 EDT

## 2024-05-06 NOTE — TELEPHONE ENCOUNTER
----- Message from Marisela Baker MD sent at 6/17/2019  3:57 PM EDT -----  Can you see if we need to cover her for her factor V  
Have spoken with the patient.  She is scheduled for knee arthroscopy on July 15.  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  We discussed putting her on Xarelto for 2 weeks postop.  I have sent in a new prescription to [facility] at [phone] for Xarelto 10 mg, #14, directions are to take 1 tablet p.o. daily beginning on July 16.  Per [facility].  Patient does understand that she will not be able to take her ibuprofen or Advil or any other nonsteroidal anti-inflammatory medication while on the Xarelto.  Patient may take Tylenol.  
68

## 2024-05-07 DIAGNOSIS — M25.561 CHRONIC PAIN OF BOTH KNEES: ICD-10-CM

## 2024-05-07 DIAGNOSIS — G89.29 CHRONIC PAIN OF BOTH KNEES: ICD-10-CM

## 2024-05-07 DIAGNOSIS — M25.562 CHRONIC PAIN OF BOTH KNEES: ICD-10-CM

## 2024-05-07 RX ORDER — DICLOFENAC SODIUM 75 MG/1
75 TABLET, DELAYED RELEASE ORAL 2 TIMES DAILY WITH MEALS
Qty: 60 TABLET | Refills: 1 | Status: SHIPPED | OUTPATIENT
Start: 2024-05-07

## 2024-06-18 ENCOUNTER — APPOINTMENT (OUTPATIENT)
Dept: CT IMAGING | Facility: HOSPITAL | Age: 39
End: 2024-06-18
Payer: COMMERCIAL

## 2024-06-18 ENCOUNTER — OFFICE VISIT (OUTPATIENT)
Dept: FAMILY MEDICINE CLINIC | Facility: CLINIC | Age: 39
End: 2024-06-18
Payer: COMMERCIAL

## 2024-06-18 ENCOUNTER — HOSPITAL ENCOUNTER (EMERGENCY)
Facility: HOSPITAL | Age: 39
Discharge: HOME | End: 2024-06-19
Payer: COMMERCIAL

## 2024-06-18 VITALS
OXYGEN SATURATION: 99 % | WEIGHT: 243 LBS | HEART RATE: 90 BPM | HEIGHT: 66 IN | RESPIRATION RATE: 14 BRPM | SYSTOLIC BLOOD PRESSURE: 114 MMHG | DIASTOLIC BLOOD PRESSURE: 76 MMHG | BODY MASS INDEX: 39.05 KG/M2 | TEMPERATURE: 97.3 F

## 2024-06-18 DIAGNOSIS — R31.29 OTHER MICROSCOPIC HEMATURIA: ICD-10-CM

## 2024-06-18 DIAGNOSIS — R31.9 URINARY TRACT INFECTION WITH HEMATURIA, SITE UNSPECIFIED: Primary | ICD-10-CM

## 2024-06-18 DIAGNOSIS — R10.9 FLANK PAIN: ICD-10-CM

## 2024-06-18 DIAGNOSIS — R10.9 ACUTE LEFT FLANK PAIN: Primary | ICD-10-CM

## 2024-06-18 DIAGNOSIS — N39.0 URINARY TRACT INFECTION WITH HEMATURIA, SITE UNSPECIFIED: Primary | ICD-10-CM

## 2024-06-18 LAB
BACTERIA UR QL AUTO: ABNORMAL /HPF
BILIRUB BLD-MCNC: NEGATIVE MG/DL
BILIRUB UR QL STRIP: NEGATIVE
CLARITY UR: ABNORMAL
CLARITY, POC: ABNORMAL
COLOR UR: ABNORMAL
COLOR UR: YELLOW
EXPIRATION DATE: ABNORMAL
GLUCOSE UR STRIP-MCNC: NEGATIVE MG/DL
GLUCOSE UR STRIP-MCNC: NEGATIVE MG/DL
HCG SERPL QL: NEGATIVE
HGB UR QL STRIP.AUTO: ABNORMAL
HYALINE CASTS UR QL AUTO: ABNORMAL /LPF
KETONES UR QL STRIP: ABNORMAL
KETONES UR QL: NEGATIVE
LEUKOCYTE EST, POC: ABNORMAL
LEUKOCYTE ESTERASE UR QL STRIP.AUTO: ABNORMAL
LIPASE SERPL-CCNC: 8 U/L (ref 13–60)
Lab: ABNORMAL
NITRITE UR QL STRIP: NEGATIVE
NITRITE UR-MCNC: NEGATIVE MG/ML
PH UR STRIP.AUTO: <=5 [PH] (ref 5–8)
PH UR: 5.5 [PH] (ref 5–8)
PROT UR QL STRIP: ABNORMAL
PROT UR STRIP-MCNC: NEGATIVE MG/DL
RBC # UR STRIP: ABNORMAL /HPF
RBC # UR STRIP: ABNORMAL /UL
REF LAB TEST METHOD: ABNORMAL
SP GR UR STRIP: 1.03 (ref 1–1.03)
SP GR UR: 1.02 (ref 1–1.03)
SQUAMOUS #/AREA URNS HPF: ABNORMAL /HPF
UROBILINOGEN UR QL STRIP: ABNORMAL
UROBILINOGEN UR QL: ABNORMAL
WBC # UR STRIP: ABNORMAL /HPF

## 2024-06-18 PROCEDURE — 99214 OFFICE O/P EST MOD 30 MIN: CPT | Performed by: NURSE PRACTITIONER

## 2024-06-18 PROCEDURE — 83690 ASSAY OF LIPASE: CPT | Performed by: PHYSICIAN ASSISTANT

## 2024-06-18 PROCEDURE — 84703 CHORIONIC GONADOTROPIN ASSAY: CPT | Performed by: PHYSICIAN ASSISTANT

## 2024-06-18 PROCEDURE — 25010000002 KETOROLAC TROMETHAMINE PER 15 MG: Performed by: PHYSICIAN ASSISTANT

## 2024-06-18 PROCEDURE — 74176 CT ABD & PELVIS W/O CONTRAST: CPT

## 2024-06-18 PROCEDURE — 87086 URINE CULTURE/COLONY COUNT: CPT | Performed by: PHYSICIAN ASSISTANT

## 2024-06-18 PROCEDURE — 36415 COLL VENOUS BLD VENIPUNCTURE: CPT

## 2024-06-18 PROCEDURE — 96375 TX/PRO/DX INJ NEW DRUG ADDON: CPT

## 2024-06-18 PROCEDURE — 81001 URINALYSIS AUTO W/SCOPE: CPT | Performed by: PHYSICIAN ASSISTANT

## 2024-06-18 PROCEDURE — 81003 URINALYSIS AUTO W/O SCOPE: CPT | Performed by: NURSE PRACTITIONER

## 2024-06-18 PROCEDURE — 96361 HYDRATE IV INFUSION ADD-ON: CPT

## 2024-06-18 PROCEDURE — 99284 EMERGENCY DEPT VISIT MOD MDM: CPT

## 2024-06-18 PROCEDURE — 25010000002 ONDANSETRON PER 1 MG: Performed by: PHYSICIAN ASSISTANT

## 2024-06-18 PROCEDURE — 25810000003 SODIUM CHLORIDE 0.9 % SOLUTION: Performed by: PHYSICIAN ASSISTANT

## 2024-06-18 RX ORDER — SODIUM CHLORIDE 0.9 % (FLUSH) 0.9 %
10 SYRINGE (ML) INJECTION AS NEEDED
Status: DISCONTINUED | OUTPATIENT
Start: 2024-06-18 | End: 2024-06-19 | Stop reason: HOSPADM

## 2024-06-18 RX ORDER — ONDANSETRON 4 MG/1
4 TABLET, FILM COATED ORAL EVERY 8 HOURS PRN
Qty: 12 TABLET | Refills: 0 | OUTPATIENT
Start: 2024-06-18 | End: 2024-06-19

## 2024-06-18 RX ORDER — ONDANSETRON 2 MG/ML
4 INJECTION INTRAMUSCULAR; INTRAVENOUS ONCE
Status: COMPLETED | OUTPATIENT
Start: 2024-06-18 | End: 2024-06-18

## 2024-06-18 RX ORDER — KETOROLAC TROMETHAMINE 30 MG/ML
15 INJECTION, SOLUTION INTRAMUSCULAR; INTRAVENOUS ONCE
Status: COMPLETED | OUTPATIENT
Start: 2024-06-18 | End: 2024-06-18

## 2024-06-18 RX ADMIN — KETOROLAC TROMETHAMINE 15 MG: 30 INJECTION, SOLUTION INTRAMUSCULAR at 22:24

## 2024-06-18 RX ADMIN — ONDANSETRON 4 MG: 2 INJECTION INTRAMUSCULAR; INTRAVENOUS at 22:24

## 2024-06-18 RX ADMIN — SODIUM CHLORIDE 1000 ML: 900 INJECTION, SOLUTION INTRAVENOUS at 22:24

## 2024-06-18 NOTE — Clinical Note
[facility] EMERGENCY DEPARTMENT  [address] [facility] [place] [address]  Phone: [phone]    Sena Min was seen and treated in our emergency department on 6/18/2024.  She may return to work on 06/21/2024.         Thank you for choosing [facility].    Bri Clement RN

## 2024-06-18 NOTE — PROGRESS NOTES
"Chief Complaint  Abdominal Pain    Subjective        Sena Min presents to [facility]  History of Present Illness    History of Present Illness  The patient is a 38-year-old female who presents for evaluation of back pain.    The patient experienced an episode of vomiting yesterday, which led her to leave work early. Upon returning home, she managed to vomit and reported an improvement in her condition. However, she awoke in the middle of the night feeling clammy, lightheaded, and experienced an unusual pain in her back, predominantly on the side. The pain has since improved, albeit with some residual discomfort. She quantifies her pain as a 1 or 2 on a scale of 10, but at its peak, it was a 4. She denies any history of kidney stones. She denies any recent unusual activities. Her stool has been soft for the past day, but she denies any significant diarrhea. She was in contact with her niece yesterday, who also fell ill. She believes she accidentally consumed 2 of her iron vitamins yesterday. Despite feeling slightly nauseous today, she has not vomited. Her appetite has been inconsistent since the onset of her symptoms. She denies any fever or chills, except for chills last night. Her mother reported that she felt feverish this morning. She denies any hematuria or hematochezia.    Supplemental Information  Her knees are doing great. She has a new job. She is taking aspirin. She was sent to hematology because she had clotted a few times while donating blood. She was told to take daily aspirin. When she was on Eliquis, she was not taking aspirin. She is still taking iron because her iron was low 2 months ago.       Objective   Vital Signs:  /76   Pulse 90   Temp 97.3 °F (36.3 °C) (Temporal)   Resp 14   Ht 167.6 cm (65.98\")   Wt 110 kg (243 lb)   SpO2 99%   BMI 39.25 kg/m²   Estimated body mass index is 39.25 kg/m² as calculated from the following:    Height as of " "this encounter: 167.6 cm (65.98\").    Weight as of this encounter: 110 kg (243 lb).               Physical Exam  Vitals and nursing note reviewed.   Constitutional:       General: She is not in acute distress.     Appearance: She is well-developed. She is not ill-appearing or diaphoretic.   HENT:      Head: Normocephalic and atraumatic.   Eyes:      General:         Right eye: No discharge.         Left eye: No discharge.      Conjunctiva/sclera: Conjunctivae normal.   Cardiovascular:      Rate and Rhythm: Normal rate and regular rhythm.   Pulmonary:      Effort: Pulmonary effort is normal.      Breath sounds: Normal breath sounds.   Abdominal:      General: Bowel sounds are normal. There is no distension.      Palpations: Abdomen is soft.      Tenderness: There is no abdominal tenderness. There is no right CVA tenderness or left CVA tenderness.   Musculoskeletal:         General: No deformity.      Comments: Gait smooth and steady   Skin:     General: Skin is warm and dry.   Neurological:      Mental Status: She is alert and oriented to person, place, and time.   Psychiatric:         Mood and Affect: Mood normal.         Behavior: Behavior normal.          Physical Exam       Result Review :            Results                  Assessment and Plan     Diagnoses and all orders for this visit:    1. Acute left flank pain (Primary)  -     CT Abdomen Pelvis Stone Protocol; Future  -     POC Urinalysis Dipstick, Automated  -     Urine Culture - Urine, Urine, Clean Catch    2. Other microscopic hematuria  -     CT Abdomen Pelvis Stone Protocol; Future  -     POC Urinalysis Dipstick, Automated  -     Urine Culture - Urine, Urine, Clean Catch    Other orders  -     Discontinue: ondansetron (Zofran) 4 MG tablet; Take 1 tablet by mouth Every 8 (Eight) Hours As Needed for Nausea or Vomiting.  Dispense: 12 tablet; Refill: 0        Assessment & Plan  1. Flank pain./hematuria  The patient's blood pressure readings are within the " normal range. A urine sample showed hematuria.  Sx most concerning for stone.  CT stone protocol ordered and refused by insurance.  Discussed with pt-will send urine for cx as she has no dysuria, other sx of UTI, pyelo.  Discussed s/s that require emergent eval and s/s renal stone.  Pt will push fluids and let me know it any worsening.  Zofran prn.             Follow Up     No follow-ups on file.  Patient was given instructions and counseling regarding her condition or for health maintenance advice. Please see specific information pulled into the AVS if appropriate.    Patient or patient representative verbalized consent for the use of Ambient Listening during the visit with  CLINTON Vega for chart documentation. 6/21/2024  06:54 EDT

## 2024-06-19 VITALS
RESPIRATION RATE: 18 BRPM | BODY MASS INDEX: 39.26 KG/M2 | OXYGEN SATURATION: 98 % | SYSTOLIC BLOOD PRESSURE: 126 MMHG | HEART RATE: 65 BPM | WEIGHT: 244.27 LBS | DIASTOLIC BLOOD PRESSURE: 68 MMHG | HEIGHT: 66 IN | TEMPERATURE: 97.4 F

## 2024-06-19 LAB
ALBUMIN SERPL-MCNC: 3.6 G/DL (ref 3.5–5.2)
ALBUMIN/GLOB SERPL: 1.3 G/DL
ALP SERPL-CCNC: 69 U/L (ref 39–117)
ALT SERPL W P-5'-P-CCNC: 15 U/L (ref 1–33)
ANION GAP SERPL CALCULATED.3IONS-SCNC: 9.9 MMOL/L (ref 5–15)
AST SERPL-CCNC: 15 U/L (ref 1–32)
BASOPHILS # BLD AUTO: 0.02 10*3/MM3 (ref 0–0.2)
BASOPHILS # BLD AUTO: NORMAL 10*3/UL
BASOPHILS NFR BLD AUTO: 0.2 % (ref 0–1.5)
BASOPHILS NFR BLD AUTO: NORMAL %
BILIRUB SERPL-MCNC: 0.5 MG/DL (ref 0–1.2)
BUN SERPL-MCNC: 10 MG/DL (ref 6–20)
BUN/CREAT SERPL: 13.7 (ref 7–25)
CALCIUM SPEC-SCNC: 8.6 MG/DL (ref 8.6–10.5)
CHLORIDE SERPL-SCNC: 106 MMOL/L (ref 98–107)
CO2 SERPL-SCNC: 21.1 MMOL/L (ref 22–29)
CREAT SERPL-MCNC: 0.73 MG/DL (ref 0.57–1)
DEPRECATED RDW RBC AUTO: 44.1 FL (ref 37–54)
DEPRECATED RDW RBC AUTO: NORMAL FL
EGFRCR SERPLBLD CKD-EPI 2021: 108.8 ML/MIN/1.73
EOSINOPHIL # BLD AUTO: 0.16 10*3/MM3 (ref 0–0.4)
EOSINOPHIL # BLD AUTO: NORMAL 10*3/UL
EOSINOPHIL NFR BLD AUTO: 1.2 % (ref 0.3–6.2)
EOSINOPHIL NFR BLD AUTO: NORMAL %
ERYTHROCYTE [DISTWIDTH] IN BLOOD BY AUTOMATED COUNT: 13.6 % (ref 12.3–15.4)
ERYTHROCYTE [DISTWIDTH] IN BLOOD BY AUTOMATED COUNT: NORMAL %
GLOBULIN UR ELPH-MCNC: 2.8 GM/DL
GLUCOSE SERPL-MCNC: 102 MG/DL (ref 65–99)
HCT VFR BLD AUTO: 37.8 % (ref 34–46.6)
HCT VFR BLD AUTO: NORMAL %
HGB BLD-MCNC: 12.2 G/DL (ref 12–15.9)
HGB BLD-MCNC: NORMAL G/DL
HOLD SPECIMEN: NORMAL
IMM GRANULOCYTES # BLD AUTO: 0.04 10*3/MM3 (ref 0–0.05)
IMM GRANULOCYTES # BLD AUTO: NORMAL 10*3/UL
IMM GRANULOCYTES NFR BLD AUTO: 0.3 % (ref 0–0.5)
IMM GRANULOCYTES NFR BLD AUTO: NORMAL %
LYMPHOCYTES # BLD AUTO: 1.57 10*3/MM3 (ref 0.7–3.1)
LYMPHOCYTES # BLD AUTO: NORMAL 10*3/UL
LYMPHOCYTES NFR BLD AUTO: 12.1 % (ref 19.6–45.3)
LYMPHOCYTES NFR BLD AUTO: NORMAL %
MCH RBC QN AUTO: 28.2 PG (ref 26.6–33)
MCH RBC QN AUTO: NORMAL PG
MCHC RBC AUTO-ENTMCNC: 32.3 G/DL (ref 31.5–35.7)
MCHC RBC AUTO-ENTMCNC: NORMAL G/DL
MCV RBC AUTO: 87.3 FL (ref 79–97)
MCV RBC AUTO: NORMAL FL
MONOCYTES # BLD AUTO: 0.54 10*3/MM3 (ref 0.1–0.9)
MONOCYTES # BLD AUTO: NORMAL 10*3/UL
MONOCYTES NFR BLD AUTO: 4.2 % (ref 5–12)
MONOCYTES NFR BLD AUTO: NORMAL %
NEUTROPHILS NFR BLD AUTO: 10.63 10*3/MM3 (ref 1.7–7)
NEUTROPHILS NFR BLD AUTO: 82 % (ref 42.7–76)
NEUTROPHILS NFR BLD AUTO: NORMAL %
NEUTROPHILS NFR BLD AUTO: NORMAL %
NRBC BLD AUTO-RTO: 0 /100 WBC (ref 0–0.2)
PLATELET # BLD AUTO: 272 10*3/MM3 (ref 140–450)
PLATELET # BLD AUTO: NORMAL 10*3/UL
PMV BLD AUTO: 10.4 FL (ref 6–12)
PMV BLD AUTO: NORMAL FL
POTASSIUM SERPL-SCNC: 3.7 MMOL/L (ref 3.5–5.2)
PROT SERPL-MCNC: 6.4 G/DL (ref 6–8.5)
RBC # BLD AUTO: 4.33 10*6/MM3 (ref 3.77–5.28)
RBC # BLD AUTO: NORMAL 10*6/UL
SODIUM SERPL-SCNC: 137 MMOL/L (ref 136–145)
WBC NRBC COR # BLD AUTO: 12.96 10*3/MM3 (ref 3.4–10.8)
WBC NRBC COR # BLD AUTO: NORMAL 10*3/UL

## 2024-06-19 PROCEDURE — 87040 BLOOD CULTURE FOR BACTERIA: CPT | Performed by: PHYSICIAN ASSISTANT

## 2024-06-19 PROCEDURE — 85025 COMPLETE CBC W/AUTO DIFF WBC: CPT | Performed by: PHYSICIAN ASSISTANT

## 2024-06-19 PROCEDURE — 96365 THER/PROPH/DIAG IV INF INIT: CPT

## 2024-06-19 PROCEDURE — 96376 TX/PRO/DX INJ SAME DRUG ADON: CPT

## 2024-06-19 PROCEDURE — 25010000002 CEFTRIAXONE PER 250 MG: Performed by: PHYSICIAN ASSISTANT

## 2024-06-19 PROCEDURE — 25010000002 ONDANSETRON PER 1 MG: Performed by: EMERGENCY MEDICINE

## 2024-06-19 PROCEDURE — 80053 COMPREHEN METABOLIC PANEL: CPT | Performed by: PHYSICIAN ASSISTANT

## 2024-06-19 RX ORDER — ONDANSETRON 4 MG/1
4 TABLET, ORALLY DISINTEGRATING ORAL EVERY 8 HOURS PRN
Qty: 20 TABLET | Refills: 0 | Status: SHIPPED | OUTPATIENT
Start: 2024-06-19

## 2024-06-19 RX ORDER — ONDANSETRON 2 MG/ML
4 INJECTION INTRAMUSCULAR; INTRAVENOUS ONCE
Status: COMPLETED | OUTPATIENT
Start: 2024-06-19 | End: 2024-06-19

## 2024-06-19 RX ORDER — AMOXICILLIN AND CLAVULANATE POTASSIUM 875; 125 MG/1; MG/1
1 TABLET, FILM COATED ORAL 2 TIMES DAILY
Qty: 10 TABLET | Refills: 0 | Status: SHIPPED | OUTPATIENT
Start: 2024-06-19 | End: 2024-06-24

## 2024-06-19 RX ADMIN — ONDANSETRON 4 MG: 2 INJECTION INTRAMUSCULAR; INTRAVENOUS at 01:08

## 2024-06-19 RX ADMIN — CEFTRIAXONE 2000 MG: 2 INJECTION, POWDER, FOR SOLUTION INTRAMUSCULAR; INTRAVENOUS at 00:35

## 2024-06-19 NOTE — DISCHARGE INSTRUCTIONS
Take medications as directed.    Follow-up with your primary care provider in 3-5 days.  If you do not have a primary care provider call [phone] for help in finding one, or you may follow up with [facility] at [phone].    Return to ED for any new or worsening symptoms

## 2024-06-19 NOTE — ED PROVIDER NOTES
Subjective   History of Present Illness  Patient is a 38-year-old female presenting to the ED with reports of nausea, vomiting, and left-sided flank pain.  She states her symptoms started yesterday progressed today.  She was seen by PCP was noted to have blood in her urine and they try to get an outpatient CT scan but it was not covered by insurance.  She was advised to come to the ED if symptoms worsened.  States the pain worsened this evening.  She denies any known fever but does report some chills.  Reports no dysuria.  She does report some loose stool.         Review of Systems   Constitutional: Negative.    Respiratory: Negative.     Cardiovascular: Negative.    Gastrointestinal:  Positive for diarrhea, nausea and vomiting. Negative for abdominal distention, abdominal pain, blood in stool and constipation.   Genitourinary:  Positive for flank pain and hematuria. Negative for decreased urine volume, difficulty urinating, dysuria, frequency and urgency.   Musculoskeletal:  Negative for back pain, neck pain and neck stiffness.   Skin: Negative.        Past Medical History:   Diagnosis Date    Ankle sprain     Anxiety     Arthritis     Depression     Factor 5 Leiden mutation, heterozygous     GERD (gastroesophageal reflux disease)     History of medical problems     Factor V Leiden    History of torn meniscus of knee     S/P surgery    Myopia 02/19/2015    Obesity     Posterior subcapsular polar senile cataract of both eyes 06/06/2022       Allergies   Allergen Reactions    Nickel Rash       Past Surgical History:   Procedure Laterality Date    ANKLE OPEN REDUCTION INTERNAL FIXATION  Jan. 2001, Jan 2016    Set broken tib-fib, remove plate from fib    FRACTURE SURGERY  01/08/2001    Plate removed 01/15/2016    JOINT REPLACEMENT  9/15/2013    Right knee patella femoral pkr and osteotomy. Left knee patella femoral pkr    KNEE ARTHROPLASTY, PARTIAL REPLACEMENT Left 12/15/2023    LEG SURGERY  01/2016    plate removed     WISDOM TOOTH EXTRACTION  2009       Family History   Problem Relation Age of Onset    Breast cancer Mother     Hypertension Mother     Arthritis Mother         Hip replacement at 61    Cancer Mother         Breast cancer age 38    Depression Mother     Heart disease Mother         AFIB    Broken bones Mother     Hypertension Father     Factor V Leiden deficiency Father     Clotting disorder Father         Factor five    Other Father         Factor V    Factor V Leiden deficiency Sister     Clotting disorder Sister         Factor five    Other Sister         Factor V    Other Brother         Crohn's    Factor V Leiden deficiency Paternal Aunt     Breast cancer Maternal Grandmother     Cancer Maternal Grandmother         Breast cancer age 50    Heart disease Maternal Grandmother         Heart valve repair    Mental illness Maternal Grandmother         Dementia    Diabetes Maternal Grandfather     Arthritis Maternal Grandfather     Heart disease Maternal Grandfather         Heart failure, Heart attack and valve replacement    Cancer Maternal Grandfather         Lung cancer    Stroke Maternal Grandfather         Cause of death    Alzheimer's disease Paternal Grandmother     Mental illness Paternal Grandmother         Alzheimers    Factor V Leiden deficiency Paternal Grandfather     Hearing loss Paternal Grandfather         meniers    Heart disease Paternal Grandfather         Heart valve replacement    Clotting disorder Paternal Grandfather         Factor five    Other Paternal Grandfather         Factor V       Social History     Socioeconomic History    Marital status: Single   Tobacco Use    Smoking status: Never     Passive exposure: Never    Smokeless tobacco: Never   Vaping Use    Vaping status: Never Used   Substance and Sexual Activity    Alcohol use: Never    Drug use: Never    Sexual activity: Never           Objective   Physical Exam  Vitals and nursing note reviewed.   Constitutional:       General: She is  not in acute distress.     Appearance: Normal appearance. She is well-developed. She is obese. She is not ill-appearing, toxic-appearing or diaphoretic.   HENT:      Head: Normocephalic and atraumatic.      Mouth/Throat:      Mouth: Mucous membranes are moist.      Pharynx: Oropharynx is clear.   Eyes:      General: No scleral icterus.     Extraocular Movements: Extraocular movements intact.      Pupils: Pupils are equal, round, and reactive to light.   Cardiovascular:      Rate and Rhythm: Normal rate and regular rhythm.      Pulses: Normal pulses.      Heart sounds: No murmur heard.     No friction rub. No gallop.   Pulmonary:      Effort: Pulmonary effort is normal. No respiratory distress.      Breath sounds: Normal breath sounds. No stridor. No wheezing, rhonchi or rales.   Chest:      Chest wall: No tenderness.   Abdominal:      General: Bowel sounds are normal. There is no distension. There are no signs of injury.      Palpations: Abdomen is soft.      Tenderness: There is no abdominal tenderness. There is left CVA tenderness. There is no right CVA tenderness, guarding or rebound.      Hernia: No hernia is present.   Skin:     General: Skin is warm.      Capillary Refill: Capillary refill takes less than 2 seconds.      Coloration: Skin is not cyanotic, jaundiced or pale.      Findings: No rash.   Neurological:      General: No focal deficit present.      Mental Status: She is alert and oriented to person, place, and time.   Psychiatric:         Mood and Affect: Mood normal.         Behavior: Behavior normal.         Procedures           ED Course  ED Course as of 06/20/24 0019   Tue Jun 18, 2024   2259 Difficulty getting blood draw [AA]   Wed Jun 19, 2024   0001 Initial CBC showed hemoglobin 5.7 hematocrit of 18.9.  I did call lab to discuss this with the as I am concerned this could be inaccurate; after further analysis of metabolic panel  agreed it should probably be redrawn  [AA]   0011 Ct results  "discussed with patient. She does report improvement of pain and nausea. Trying PO challenge.  [AA]   0016 RDW-SD: 47.2 [AA]   0029 Patient care transferred to [facility] pending Cmp and dispo  [AA]   0051 Reviewed the CMP and the patient was discharged   [KW]      ED Course User Index  [AA] Danny Barkley PA  [KW] Damon Yennifer D, APRN      /68   Pulse 53   Temp 97.4 °F (36.3 °C) (Oral)   Resp 18   Ht 167.6 cm (66\")   Wt 111 kg (244 lb 4.3 oz)   LMP 05/28/2024 (Exact Date)   SpO2 98%   BMI 39.43 kg/m²   Medications   sodium chloride 0.9 % flush 10 mL (has no administration in time range)   cefTRIAXone (ROCEPHIN) 2,000 mg in sodium chloride 0.9 % 100 mL MBP (has no administration in time range)   ketorolac (TORADOL) injection 15 mg (15 mg Intravenous Given 6/18/24 2224)   sodium chloride 0.9 % bolus 1,000 mL (0 mL Intravenous Stopped 6/18/24 2348)   ondansetron (ZOFRAN) injection 4 mg (4 mg Intravenous Given 6/18/24 2224)     Labs Reviewed   LIPASE - Abnormal; Notable for the following components:       Result Value    Lipase 8 (*)     All other components within normal limits   URINALYSIS W/ CULTURE IF INDICATED - Abnormal; Notable for the following components:    Appearance, UA Cloudy (*)     Ketones, UA Trace (*)     Blood, UA Small (1+) (*)     Protein, UA Trace (*)     Leuk Esterase, UA Small (1+) (*)     All other components within normal limits    Narrative:     In absence of clinical symptoms, the presence of pyuria, bacteria, and/or nitrites on the urinalysis result does not correlate with infection.   URINALYSIS, MICROSCOPIC ONLY - Abnormal; Notable for the following components:    RBC, UA 6-10 (*)     WBC, UA 11-20 (*)     Bacteria, UA 2+ (*)     Squamous Epithelial Cells, UA 7-12 (*)     All other components within normal limits   CBC WITH AUTO DIFFERENTIAL - Abnormal; Notable for the following components:    WBC 12.96 (*)     Neutrophil % 82.0 (*)     Lymphocyte % 12.1 (*)     " Monocyte % 4.2 (*)     Neutrophils, Absolute 10.63 (*)     All other components within normal limits   HCG, SERUM, QUALITATIVE - Normal   URINE CULTURE   BLOOD CULTURE   BLOOD CULTURE   CBC WITH AUTO DIFFERENTIAL    Narrative:     Modified report. Previous result was Hemogram + Auto diff on 6/18/2024 at 2335 EDT.  The previously reported component NRBC is no longer being reported. Previous result was 0.0 /100 WBC (Reference Range: 0.0-0.2 /100 WBC) on 6/18/2024 at 2335 EDT.   COMPREHENSIVE METABOLIC PANEL   CBC AND DIFFERENTIAL    Narrative:     The following orders were created for panel order CBC & Differential.  Procedure                               Abnormality         Status                     ---------                               -----------         ------                     CBC Auto Differential[[id number]]                            Edited Result - FINAL        Please view results for these tests on the individual orders.   CBC AND DIFFERENTIAL    Narrative:     The following orders were created for panel order CBC & Differential.  Procedure                               Abnormality         Status                     ---------                               -----------         ------                     CBC Auto Differential[[id number]]        Abnormal            Final result                 Please view results for these tests on the individual orders.     CT Abdomen Pelvis Stone Protocol   Final Result   Impression:   1.No acute abdominal or pelvic abnormality.   2.Hepatic steatosis.   3.Stable small hemangioma in the liver.            Electronically Signed: Bob Minaya MD     6/19/2024 12:08 AM EDT     Workstation ID: [id number]                                               Medical Decision Making  Chart Review: PCP note reviewed from today presented with reports of back pain was given Zofran and had urinalysis which was positive for blood and trace leukocyte esterase    Differentials: Obstructing  kidney stone, pyelonephritis, intra-abdominal infection, obstruction     ;this list is not all inclusive and does not constitute the entirety of considered causes  Labs: As above  Radiology: My independent rotation of CT abdomen pelvis shows no obvious obstructing kidney stone.  Correlate with radiologist interpretation as below  CT Abdomen Pelvis Stone Protocol   Final Result    Impression:    1.No acute abdominal or pelvic abnormality.    2.Hepatic steatosis.    3.Stable small hemangioma in the liver.        Electronically Signed: Bob Minaya MD      6/19/2024 12:08 AM EDT      Workstation ID: [id number]     Disposition/Treatment:  Appropriate PPE was worn during exam and throughout all encounters with the patient.  Patient presented to the ED with reports of acute severe left-sided flank pain with hematuria patient was given fluids Toradol and Zofran while in the ED.  Labs and imaging were obtained.    Patient's ED stay was prolonged secondary to difficulty getting blood work drawn.  CT abdomen and pelvis showed no acute infection or obstruction additional findings of the liver as described above.  Urinalysis did show possible UTI with 2+ bacteria, 11-20 WBCs, hematuria, and leukocyte esterase.  Patient was given Rocephin while in the ED pending urine culture.  Lipase unremarkable.  Initial CBC that was resulted showed anemia I did discuss this with the  as I do not think this was accurate. Repeat CBC showed no anemia did show slightly elevated WBC of 12.96.  Patient care transferred to [facility] pending metabolic panel disposition hopeful for discharge.     Please see ED course.  Metabolic panel unremarkable.  Findings were discussed the patient who voiced understanding of discharge was in agreement with plan all questions were answered.  Was sent home on Zofran and Augmentin.    This document is intended for medical expert use only. Reading of this document by patients and/or patient's family  without participating medical staff guidance may result in misinterpretation and unintended morbidity.  Any interpretation of such data is the responsibility of the patient and/or family member responsible for the patient in concert with their primary or specialist providers, not to be left for sources of online searches such as [employer], [employer] or similar queries. Relying on these approaches to knowledge may result in misinterpretation, misguided goals of care and even death should patients or family members try recommendations outside of the realm of professional medical care in a supervised inpatient environment.           Problems Addressed:  Flank pain: acute illness or injury  Urinary tract infection with hematuria, site unspecified: acute illness or injury    Amount and/or Complexity of Data Reviewed  Labs: ordered. Decision-making details documented in ED Course.  Radiology: ordered. Decision-making details documented in ED Course.    Risk  Prescription drug management.        Final diagnoses:   Flank pain   Urinary tract infection with hematuria, site unspecified       ED Disposition  ED Disposition       None            Jennifer Dobson, APRN  [address]  [place] [address]  [phone]    Schedule an appointment as soon as possible for a visit in 3 days      [facility] EMERGENCY DEPARTMENT  [address] [place] [id number] [phone]  Go to   If symptoms worsen         Medication List        New Prescriptions      amoxicillin-clavulanate 875-125 MG per tablet  Commonly known as: AUGMENTIN  Take 1 tablet by mouth 2 (Two) Times a Day for 5 days.     ondansetron ODT 4 MG disintegrating tablet  Commonly known as: ZOFRAN-ODT  Place 1 tablet on the tongue Every 8 (Eight) Hours As Needed for Nausea.            Stop      ondansetron 4 MG tablet  Commonly known as: Zofran               Where to Get Your Medications        These medications were sent to [id number] - [place], [place] - [address] AT SEC OF [place] [address] & [address] - [phone]  - [phone]  [address], [place] [address]      Phone: [phone]   amoxicillin-clavulanate 875-125 MG per tablet  ondansetron ODT 4 MG disintegrating tablet            Danny Barkley PA  06/20/24 0019

## 2024-06-20 LAB
BACTERIA SPEC AEROBE CULT: NORMAL
BACTERIA UR CULT: NORMAL
BACTERIA UR CULT: NORMAL

## 2024-06-24 ENCOUNTER — OFFICE VISIT (OUTPATIENT)
Dept: FAMILY MEDICINE CLINIC | Facility: CLINIC | Age: 39
End: 2024-06-24
Payer: COMMERCIAL

## 2024-06-24 VITALS
RESPIRATION RATE: 18 BRPM | SYSTOLIC BLOOD PRESSURE: 120 MMHG | TEMPERATURE: 98.3 F | BODY MASS INDEX: 39.21 KG/M2 | OXYGEN SATURATION: 99 % | WEIGHT: 244 LBS | HEIGHT: 66 IN | HEART RATE: 66 BPM | DIASTOLIC BLOOD PRESSURE: 66 MMHG

## 2024-06-24 DIAGNOSIS — R19.7 DIARRHEA IN ADULT PATIENT: ICD-10-CM

## 2024-06-24 DIAGNOSIS — N30.00 ACUTE CYSTITIS WITHOUT HEMATURIA: Primary | ICD-10-CM

## 2024-06-24 LAB
BACTERIA SPEC AEROBE CULT: NORMAL
BACTERIA SPEC AEROBE CULT: NORMAL

## 2024-06-24 PROCEDURE — 99213 OFFICE O/P EST LOW 20 MIN: CPT | Performed by: FAMILY MEDICINE

## 2024-06-24 RX ORDER — DICYCLOMINE HYDROCHLORIDE 10 MG/1
10 CAPSULE ORAL DAILY PRN
Qty: 30 CAPSULE | Refills: 0 | Status: SHIPPED | OUTPATIENT
Start: 2024-06-24

## 2024-06-24 NOTE — PROGRESS NOTES
"Chief Complaint  Chief Complaint   Patient presents with    Hospital Follow Up Visit     Pt here for f/u of UTI        Transitional Care Progress Note        Subjective    History of Present Illness        Sena Min is a 38 y.o. female who presents for a transitional care management visit.    Within 48 business hours after discharge our office contacted her via telephone to coordinate her care and needs.      I reviewed and discussed the details of that call along with the discharge summary, hospital problems, inpatient lab results, inpatient diagnostic studies, and consultation reports with Sena.     Current outpatient and discharge medications have been reconciled for the patient.  Reviewed by: Johnie Barkley Sr., MD           No data to display              Risk for Readmission (LACE) No data recorded    Sena Min presents to [facility] for   History of Present Illness   History of Present Illness  The patient presents for evaluation of antibiotic-induced diarrhea.    The patient began experiencing symptoms of vomiting and abdominal pain last week, prompting a consultation with Jennifer. A CT scan was conducted, revealing no renal calculi and a urinary tract infection (UTI) was suspected. The patient was prescribed Augmentin 875, which she completed last night, and has since reported an improvement in her condition. However, she continues to suffer from antibiotic-induced diarrhea. Despite adhering to a probiotic regimen, she has not observed any improvement. A urinalysis was not conducted, and she denies any history of yeast infections associated with antibiotic use.    Objective   Vital Signs:   Visit Vitals  /66   Pulse 66   Temp 98.3 °F (36.8 °C)   Resp 18   Ht 167.6 cm (66\")   Wt 111 kg (244 lb)   LMP 05/28/2024 (Exact Date)   SpO2 99%   BMI 39.38 kg/m²             Physical Exam  Vitals reviewed.   Constitutional:       Appearance: She is " well-developed.   HENT:      Head: Normocephalic.      Right Ear: External ear normal.      Left Ear: External ear normal.      Nose: Nose normal.   Eyes:      Conjunctiva/sclera: Conjunctivae normal.   Cardiovascular:      Rate and Rhythm: Normal rate and regular rhythm.   Pulmonary:      Effort: Pulmonary effort is normal.      Breath sounds: Normal breath sounds.   Musculoskeletal:         General: Normal range of motion.      Cervical back: Normal range of motion and neck supple.   Skin:     General: Skin is warm and dry.      Capillary Refill: Capillary refill takes less than 2 seconds.   Neurological:      Mental Status: She is alert and oriented to person, place, and time.        Physical Exam          Result Review :           Results  Laboratory Studies  Urinalysis showed presence of blood.    Imaging  CT scan showed no kidney stones.             Assessment and Plan      Diagnoses and all orders for this visit:    1. Acute cystitis without hematuria (Primary)  Assessment & Plan:  Hospital records reviewed  Patient was seen in the ER due to worsening dysuria.  She was given antibiotics to help treat her symptoms      2. Diarrhea in adult patient  Assessment & Plan:  She will be given a small dose of dicyclomine to help slow down her GI tract.  If her diarrhea does not stop consider C. difficile due to antibiotic use.    Orders:  -     dicyclomine (BENTYL) 10 MG capsule; Take 1 capsule by mouth Daily As Needed for Abdominal Cramping.  Dispense: 30 capsule; Refill: 0       Assessment & Plan          Follow Up   No follow-ups on file.  Patient was given instructions and counseling regarding her condition or for health maintenance advice. Please see specific information pulled into the AVS if appropriate.         Answers submitted by the patient for this visit:  Other (Submitted on 6/20/2024)  Please describe your symptoms.: Follow up from ER visit with diagnosis of UTI., Flank pain and vomiting.  Have you had  these symptoms before?: No  How long have you been having these symptoms?: 1-4 days  Please list any medications you are currently taking for this condition.: Antibiotics prescribed by ER  Primary Reason for Visit (Submitted on 6/20/2024)  What is the primary reason for your visit?: Other

## 2024-06-25 PROBLEM — R19.7 DIARRHEA IN ADULT PATIENT: Status: ACTIVE | Noted: 2024-06-25

## 2024-06-25 PROBLEM — N30.01 ACUTE CYSTITIS WITH HEMATURIA: Status: ACTIVE | Noted: 2024-06-25

## 2024-06-25 PROBLEM — N30.00 ACUTE CYSTITIS WITHOUT HEMATURIA: Status: ACTIVE | Noted: 2024-06-25

## 2024-06-25 NOTE — ASSESSMENT & PLAN NOTE
She will be given a small dose of dicyclomine to help slow down her GI tract.  If her diarrhea does not stop consider C. difficile due to antibiotic use.

## 2024-06-25 NOTE — ASSESSMENT & PLAN NOTE
Hospital records reviewed  Patient was seen in the ER due to worsening dysuria.  She was given antibiotics to help treat her symptoms

## 2024-07-12 ENCOUNTER — OFFICE VISIT (OUTPATIENT)
Dept: FAMILY MEDICINE CLINIC | Facility: CLINIC | Age: 39
End: 2024-07-12
Payer: COMMERCIAL

## 2024-07-12 VITALS
TEMPERATURE: 97.3 F | RESPIRATION RATE: 14 BRPM | HEIGHT: 66 IN | DIASTOLIC BLOOD PRESSURE: 80 MMHG | OXYGEN SATURATION: 99 % | SYSTOLIC BLOOD PRESSURE: 128 MMHG | BODY MASS INDEX: 39.7 KG/M2 | WEIGHT: 247 LBS | HEART RATE: 59 BPM

## 2024-07-12 DIAGNOSIS — Z00.00 ROUTINE GENERAL MEDICAL EXAMINATION AT A HEALTH CARE FACILITY: Primary | ICD-10-CM

## 2024-07-12 DIAGNOSIS — Z12.31 ENCOUNTER FOR SCREENING MAMMOGRAM FOR MALIGNANT NEOPLASM OF BREAST: ICD-10-CM

## 2024-07-12 DIAGNOSIS — R31.21 ASYMPTOMATIC MICROSCOPIC HEMATURIA: ICD-10-CM

## 2024-07-12 PROCEDURE — 99395 PREV VISIT EST AGE 18-39: CPT | Performed by: NURSE PRACTITIONER

## 2024-07-12 NOTE — PROGRESS NOTES
Chief Complaint  Annual Exam    Subjective        Sena Min presents to [facility]  History of Present Illness    History of Present Illness  The patient is a 38-year-old female who presents for annual physical.    The patient recently experienced a urinary tract infection (UTI), which was successfully treated with antibiotics. However, a week post-antibiotic treatment, she experienced diarrhea, which has since resolved. She denies any diverticular issues or vomiting now and stools have returned to normal. During her ER visit, she was found to be dehydrated. She reports regular hematuria for several years, but denies any current UTI symptoms, vaginal bleeding, or discharge. Her urine has been regularly checked during her annual physicals from previous provider. She was not previously taking aspirin and ibuprofen frequently.    The patient's knees are currently in good condition, with increased mobility compared to the past three years. She plans to travel to [place] at the end of 03/2024 or beginning of 04/2024 to hike.  She has experienced some weight loss from increased activity. She has ceased daily ibuprofen use and increased hydration. Feels well recovered from b/l knee surgery.     Supplemental Information  She denies any shortness of breath, cough, chest pain, or heart palpitations. She has good tolerance for activity. She has acid reflux and is taking daily medication and controls it. She denies any trouble swallowing or unexpected sore throats. She has been using Flonase for her allergies. She is scheduled for cataract surgery in 10/2024. She denies any unusual fatigue. She denies any skin issues. She has a dentist appointment next month. She has a follow-up with hematology at the beginning of next month.   She denies any family history of kidney or bladder cancer. Her mother was diagnosed with breast cancer at patient's age.. Her brother has Crohn's disease.    "She has had the HPV vaccine. Not sexually active.  No abnl pap. Regular periods.        Objective   Vital Signs:  /80   Pulse 59   Temp 97.3 °F (36.3 °C) (Temporal)   Resp 14   Ht 167.6 cm (66\")   Wt 112 kg (247 lb)   SpO2 99%   BMI 39.87 kg/m²   Estimated body mass index is 39.87 kg/m² as calculated from the following:    Height as of this encounter: 167.6 cm (66\").    Weight as of this encounter: 112 kg (247 lb).               Physical Exam  Vitals and nursing note reviewed.   Constitutional:       General: She is not in acute distress.     Appearance: She is well-developed. She is not ill-appearing.   HENT:      Head: Normocephalic and atraumatic.      Right Ear: Tympanic membrane, ear canal and external ear normal.      Left Ear: Tympanic membrane, ear canal and external ear normal.      Mouth/Throat:      Mouth: Mucous membranes are moist.      Pharynx: Uvula midline. No posterior oropharyngeal erythema.   Eyes:      General: No scleral icterus.        Right eye: No discharge.         Left eye: No discharge.      Conjunctiva/sclera: Conjunctivae normal.   Neck:      Thyroid: No thyromegaly.   Cardiovascular:      Rate and Rhythm: Normal rate and regular rhythm.      Heart sounds: Normal heart sounds.   Pulmonary:      Effort: Pulmonary effort is normal.      Breath sounds: Normal breath sounds.   Abdominal:      General: Bowel sounds are normal. There is no distension.      Palpations: Abdomen is soft. There is no mass.      Tenderness: There is no abdominal tenderness.   Musculoskeletal:         General: No deformity.      Cervical back: Neck supple.      Comments: Gait smooth and steady   Lymphadenopathy:      Cervical: No cervical adenopathy.   Skin:     General: Skin is warm and dry.   Neurological:      General: No focal deficit present.      Mental Status: She is alert and oriented to person, place, and time.   Psychiatric:         Mood and Affect: Mood and affect normal.         Speech: " Speech normal.         Behavior: Behavior normal. Behavior is cooperative.         Thought Content: Thought content normal.         Cognition and Memory: Cognition normal.      Comments: Very pleasant, conversant, engaged          Physical Exam       Result Review :            Results  Laboratory Studies  Urine cultures came back negative. But got better on abx.     Imaging  CT of abdomen and pelvis showed no evidence of kidney cancer or bladder cancer.                Assessment and Plan     Diagnoses and all orders for this visit:    1. Routine general medical examination at a health care facility (Primary)  -     CBC (No Diff)  -     Comprehensive Metabolic Panel  -     Hemoglobin A1c  -     Lipid Panel With LDL / HDL Ratio  -     TSH Rfx On Abnormal To Free T4  -     Urinalysis With Microscopic - Urine, Clean Catch    2. Asymptomatic microscopic hematuria  -     Urinalysis With Microscopic - Urine, Clean Catch    3. Encounter for screening mammogram for malignant neoplasm of breast  -     Mammo screening digital tomosynthesis bilateral w CAD; Future        Assessment & Plan  1. Annual physical.  The patient's blood pressure and pulse are within normal limits. A urinalysis will be conducted today. A mammogram will be ordered. Should the urinalysis reveal blood, a referral to urology will be done. Probable cysto.     Appropriate health maintenance and prevention topics specific for this patient were discussed today.  Additionally, health goals, and health concerns addressed as appropriate.  Pt was encouraged to stay up to date on recommended screenings and vaccines based on USPSTF guidelines.     Pt can schedule back for pap at her convenience if she would like at her convenience              Follow Up     No follow-ups on file.  Patient was given instructions and counseling regarding her condition or for health maintenance advice. Please see specific information pulled into the AVS if appropriate.    Patient or  patient representative verbalized consent for the use of Ambient Listening during the visit with  CLINTON Vega for chart documentation. 7/12/2024  10:11 EDT

## 2024-07-13 LAB
ALBUMIN SERPL-MCNC: 4.1 G/DL (ref 3.5–5.2)
ALBUMIN/GLOB SERPL: 1.6 G/DL
ALP SERPL-CCNC: 86 U/L (ref 39–117)
ALT SERPL-CCNC: 22 U/L (ref 1–33)
APPEARANCE UR: CLEAR
AST SERPL-CCNC: 20 U/L (ref 1–32)
BACTERIA #/AREA URNS HPF: ABNORMAL /HPF
BILIRUB SERPL-MCNC: 0.4 MG/DL (ref 0–1.2)
BILIRUB UR QL STRIP: NEGATIVE
BUN SERPL-MCNC: 12 MG/DL (ref 6–20)
BUN/CREAT SERPL: 14.6 (ref 7–25)
CALCIUM SERPL-MCNC: 9.4 MG/DL (ref 8.6–10.5)
CASTS URNS MICRO: ABNORMAL
CHLORIDE SERPL-SCNC: 105 MMOL/L (ref 98–107)
CHOLEST SERPL-MCNC: 161 MG/DL (ref 0–200)
CO2 SERPL-SCNC: 23.8 MMOL/L (ref 22–29)
COLOR UR: YELLOW
CREAT SERPL-MCNC: 0.82 MG/DL (ref 0.57–1)
EGFRCR SERPLBLD CKD-EPI 2021: 94.6 ML/MIN/1.73
EPI CELLS #/AREA URNS HPF: ABNORMAL /HPF
ERYTHROCYTE [DISTWIDTH] IN BLOOD BY AUTOMATED COUNT: 13.7 % (ref 12.3–15.4)
GLOBULIN SER CALC-MCNC: 2.6 GM/DL
GLUCOSE SERPL-MCNC: 93 MG/DL (ref 65–99)
GLUCOSE UR QL STRIP: NEGATIVE
HBA1C MFR BLD: 5.7 % (ref 4.8–5.6)
HCT VFR BLD AUTO: 40.6 % (ref 34–46.6)
HDLC SERPL-MCNC: 59 MG/DL (ref 40–60)
HGB BLD-MCNC: 13.1 G/DL (ref 12–15.9)
HGB UR QL STRIP: ABNORMAL
KETONES UR QL STRIP: NEGATIVE
LDLC SERPL CALC-MCNC: 91 MG/DL (ref 0–100)
LDLC/HDLC SERPL: 1.55 {RATIO}
LEUKOCYTE ESTERASE UR QL STRIP: ABNORMAL
MCH RBC QN AUTO: 28.2 PG (ref 26.6–33)
MCHC RBC AUTO-ENTMCNC: 32.3 G/DL (ref 31.5–35.7)
MCV RBC AUTO: 87.5 FL (ref 79–97)
NITRITE UR QL STRIP: NEGATIVE
PH UR STRIP: 5.5 [PH] (ref 5–8)
PLATELET # BLD AUTO: 323 10*3/MM3 (ref 140–450)
POTASSIUM SERPL-SCNC: 4.1 MMOL/L (ref 3.5–5.2)
PROT SERPL-MCNC: 6.7 G/DL (ref 6–8.5)
PROT UR QL STRIP: NEGATIVE
RBC # BLD AUTO: 4.64 10*6/MM3 (ref 3.77–5.28)
RBC #/AREA URNS HPF: ABNORMAL /HPF
SODIUM SERPL-SCNC: 138 MMOL/L (ref 136–145)
SP GR UR STRIP: 1.02 (ref 1–1.03)
TRIGL SERPL-MCNC: 52 MG/DL (ref 0–150)
TSH SERPL DL<=0.005 MIU/L-ACNC: 1.85 UIU/ML (ref 0.27–4.2)
UROBILINOGEN UR STRIP-MCNC: ABNORMAL MG/DL
VLDLC SERPL CALC-MCNC: 11 MG/DL (ref 5–40)
WBC # BLD AUTO: 8.59 10*3/MM3 (ref 3.4–10.8)
WBC #/AREA URNS HPF: ABNORMAL /HPF

## 2024-07-15 DIAGNOSIS — R31.21 ASYMPTOMATIC MICROSCOPIC HEMATURIA: Primary | ICD-10-CM

## 2024-08-13 ENCOUNTER — HOSPITAL ENCOUNTER (OUTPATIENT)
Dept: MAMMOGRAPHY | Facility: HOSPITAL | Age: 39
Discharge: HOME | End: 2024-08-13
Admitting: NURSE PRACTITIONER
Payer: COMMERCIAL

## 2024-08-13 DIAGNOSIS — Z12.31 ENCOUNTER FOR SCREENING MAMMOGRAM FOR MALIGNANT NEOPLASM OF BREAST: ICD-10-CM

## 2024-08-13 PROCEDURE — 77063 BREAST TOMOSYNTHESIS BI: CPT

## 2024-08-13 PROCEDURE — 77067 SCR MAMMO BI INCL CAD: CPT

## 2024-08-29 DIAGNOSIS — D68.59 HYPERCOAGULABLE STATE: ICD-10-CM

## 2024-08-29 DIAGNOSIS — D68.51 HETEROZYGOUS FACTOR V LEIDEN MUTATION: Primary | ICD-10-CM

## 2024-09-21 DIAGNOSIS — K21.9 GASTROESOPHAGEAL REFLUX DISEASE, UNSPECIFIED WHETHER ESOPHAGITIS PRESENT: ICD-10-CM

## 2024-09-23 RX ORDER — PANTOPRAZOLE SODIUM 40 MG/1
40 TABLET, DELAYED RELEASE ORAL DAILY
Qty: 90 TABLET | Refills: 1 | Status: SHIPPED | OUTPATIENT
Start: 2024-09-23

## 2025-01-07 ENCOUNTER — HOSPITAL ENCOUNTER (EMERGENCY)
Facility: HOSPITAL | Age: 40
Discharge: HOME | End: 2025-01-07
Attending: EMERGENCY MEDICINE | Admitting: EMERGENCY MEDICINE
Payer: COMMERCIAL

## 2025-01-07 ENCOUNTER — APPOINTMENT (OUTPATIENT)
Dept: CT IMAGING | Facility: HOSPITAL | Age: 40
End: 2025-01-07
Payer: COMMERCIAL

## 2025-01-07 VITALS
BODY MASS INDEX: 38.57 KG/M2 | TEMPERATURE: 98.2 F | HEART RATE: 53 BPM | OXYGEN SATURATION: 100 % | WEIGHT: 240 LBS | SYSTOLIC BLOOD PRESSURE: 147 MMHG | RESPIRATION RATE: 18 BRPM | DIASTOLIC BLOOD PRESSURE: 83 MMHG | HEIGHT: 66 IN

## 2025-01-07 DIAGNOSIS — N20.1 URETERAL CALCULUS: Primary | ICD-10-CM

## 2025-01-07 LAB
AMORPH URATE CRY URNS QL MICRO: ABNORMAL /HPF
ANION GAP SERPL CALCULATED.3IONS-SCNC: 10.6 MMOL/L (ref 5–15)
BACTERIA UR QL AUTO: ABNORMAL /HPF
BASOPHILS # BLD AUTO: 0.03 10*3/MM3 (ref 0–0.2)
BASOPHILS NFR BLD AUTO: 0.2 % (ref 0–1.5)
BILIRUB UR QL STRIP: NEGATIVE
BUN SERPL-MCNC: 19 MG/DL (ref 6–20)
BUN/CREAT SERPL: 19.8 (ref 7–25)
CALCIUM SPEC-SCNC: 9.7 MG/DL (ref 8.6–10.5)
CHLORIDE SERPL-SCNC: 103 MMOL/L (ref 98–107)
CLARITY UR: ABNORMAL
CO2 SERPL-SCNC: 23.4 MMOL/L (ref 22–29)
COD CRY URNS QL: ABNORMAL /HPF
COLOR UR: YELLOW
CREAT SERPL-MCNC: 0.96 MG/DL (ref 0.57–1)
DEPRECATED RDW RBC AUTO: 43.7 FL (ref 37–54)
EGFRCR SERPLBLD CKD-EPI 2021: 77.8 ML/MIN/1.73
EOSINOPHIL # BLD AUTO: 0.14 10*3/MM3 (ref 0–0.4)
EOSINOPHIL NFR BLD AUTO: 1.1 % (ref 0.3–6.2)
ERYTHROCYTE [DISTWIDTH] IN BLOOD BY AUTOMATED COUNT: 14.6 % (ref 12.3–15.4)
GLUCOSE SERPL-MCNC: 108 MG/DL (ref 65–99)
GLUCOSE UR STRIP-MCNC: NEGATIVE MG/DL
GRAN CASTS URNS QL MICRO: ABNORMAL /LPF
HCT VFR BLD AUTO: 32.6 % (ref 34–46.6)
HGB BLD-MCNC: 10.3 G/DL (ref 12–15.9)
HGB UR QL STRIP.AUTO: ABNORMAL
HYALINE CASTS UR QL AUTO: ABNORMAL /LPF
IMM GRANULOCYTES # BLD AUTO: 0.04 10*3/MM3 (ref 0–0.05)
IMM GRANULOCYTES NFR BLD AUTO: 0.3 % (ref 0–0.5)
KETONES UR QL STRIP: ABNORMAL
LEUKOCYTE ESTERASE UR QL STRIP.AUTO: ABNORMAL
LIPASE SERPL-CCNC: 30 U/L (ref 13–60)
LYMPHOCYTES # BLD AUTO: 1.51 10*3/MM3 (ref 0.7–3.1)
LYMPHOCYTES NFR BLD AUTO: 11.9 % (ref 19.6–45.3)
MCH RBC QN AUTO: 25.8 PG (ref 26.6–33)
MCHC RBC AUTO-ENTMCNC: 31.6 G/DL (ref 31.5–35.7)
MCV RBC AUTO: 81.5 FL (ref 79–97)
MONOCYTES # BLD AUTO: 0.5 10*3/MM3 (ref 0.1–0.9)
MONOCYTES NFR BLD AUTO: 3.9 % (ref 5–12)
NEUTROPHILS NFR BLD AUTO: 10.5 10*3/MM3 (ref 1.7–7)
NEUTROPHILS NFR BLD AUTO: 82.6 % (ref 42.7–76)
NITRITE UR QL STRIP: NEGATIVE
NRBC BLD AUTO-RTO: 0 /100 WBC (ref 0–0.2)
PH UR STRIP.AUTO: <=5 [PH] (ref 5–8)
PLATELET # BLD AUTO: 344 10*3/MM3 (ref 140–450)
PMV BLD AUTO: 10.1 FL (ref 6–12)
POTASSIUM SERPL-SCNC: 4.2 MMOL/L (ref 3.5–5.2)
PROT UR QL STRIP: ABNORMAL
RBC # BLD AUTO: 4 10*6/MM3 (ref 3.77–5.28)
RBC # UR STRIP: ABNORMAL /HPF
REF LAB TEST METHOD: ABNORMAL
SODIUM SERPL-SCNC: 137 MMOL/L (ref 136–145)
SP GR UR STRIP: 1.03 (ref 1–1.03)
SQUAMOUS #/AREA URNS HPF: ABNORMAL /HPF
UROBILINOGEN UR QL STRIP: ABNORMAL
WBC # UR STRIP: ABNORMAL /HPF
WBC NRBC COR # BLD AUTO: 12.72 10*3/MM3 (ref 3.4–10.8)

## 2025-01-07 PROCEDURE — 81001 URINALYSIS AUTO W/SCOPE: CPT | Performed by: EMERGENCY MEDICINE

## 2025-01-07 PROCEDURE — 85025 COMPLETE CBC W/AUTO DIFF WBC: CPT | Performed by: EMERGENCY MEDICINE

## 2025-01-07 PROCEDURE — 83690 ASSAY OF LIPASE: CPT | Performed by: EMERGENCY MEDICINE

## 2025-01-07 PROCEDURE — 74176 CT ABD & PELVIS W/O CONTRAST: CPT

## 2025-01-07 PROCEDURE — 99284 EMERGENCY DEPT VISIT MOD MDM: CPT

## 2025-01-07 PROCEDURE — 80048 BASIC METABOLIC PNL TOTAL CA: CPT | Performed by: EMERGENCY MEDICINE

## 2025-01-07 RX ORDER — TRAMADOL HYDROCHLORIDE 50 MG/1
50 TABLET ORAL EVERY 6 HOURS PRN
Qty: 12 TABLET | Refills: 0 | Status: SHIPPED | OUTPATIENT
Start: 2025-01-07

## 2025-01-07 RX ORDER — SODIUM CHLORIDE 0.9 % (FLUSH) 0.9 %
10 SYRINGE (ML) INJECTION AS NEEDED
Status: DISCONTINUED | OUTPATIENT
Start: 2025-01-07 | End: 2025-01-08 | Stop reason: HOSPADM

## 2025-01-08 NOTE — ED NOTES
Patient reports left sided flank pain since 1600. Patient states pain has decreased on arrival to room in ED. Patient reports nausea but no vomiting.

## 2025-01-08 NOTE — ED PROVIDER NOTES
Subjective   History of Present Illness  Patient is a 39-year-old female complaining of left flank pain for the past several hours.  States pain is mild without radiation.  Denies cough fever shortness of breath vomiting diarrhea or other complaint      Review of Systems    Past Medical History:   Diagnosis Date    Ankle sprain     Anxiety     Arthritis     Depression     Factor 5 Leiden mutation, heterozygous     GERD (gastroesophageal reflux disease)     History of medical problems     Factor V Leiden    History of torn meniscus of knee     S/P surgery    Myopia 02/19/2015    Obesity     Posterior subcapsular polar senile cataract of both eyes 06/06/2022       Allergies   Allergen Reactions    Nickel Rash       Past Surgical History:   Procedure Laterality Date    ANKLE OPEN REDUCTION INTERNAL FIXATION  Jan. 2001, Jan 2016    Set broken tib-fib, remove plate from fib    FRACTURE SURGERY  01/08/2001    Plate removed 01/15/2016    JOINT REPLACEMENT  9/15/2013    Right knee patella femoral pkr and osteotomy. Left knee patella femoral pkr    KNEE ARTHROPLASTY, PARTIAL REPLACEMENT Left 12/15/2023    LEG SURGERY  01/2016    plate removed    WISDOM TOOTH EXTRACTION  2009       Family History   Problem Relation Age of Onset    Breast cancer Mother     Hypertension Mother     Arthritis Mother         Hip replacement at 61    Cancer Mother         Breast cancer age 38    Depression Mother     Heart disease Mother         AFIB    Broken bones Mother     Hypertension Father     Factor V Leiden deficiency Father     Clotting disorder Father         Factor five    Other Father         Factor V    Factor V Leiden deficiency Sister     Clotting disorder Sister         Factor five    Other Sister         Factor V    Other Brother         Crohn's    Factor V Leiden deficiency Paternal Aunt     Breast cancer Maternal Grandmother     Cancer Maternal Grandmother         Breast cancer age 50    Heart disease Maternal Grandmother          Heart valve repair    Mental illness Maternal Grandmother         Dementia    Diabetes Maternal Grandfather     Arthritis Maternal Grandfather     Heart disease Maternal Grandfather         Heart failure, Heart attack and valve replacement    Cancer Maternal Grandfather         Lung cancer    Stroke Maternal Grandfather         Cause of death    Alzheimer's disease Paternal Grandmother     Mental illness Paternal Grandmother         Alzheimers    Factor V Leiden deficiency Paternal Grandfather     Hearing loss Paternal Grandfather         meniers    Heart disease Paternal Grandfather         Heart valve replacement    Clotting disorder Paternal Grandfather         Factor five    Other Paternal Grandfather         Factor V       Social History     Socioeconomic History    Marital status: Single   Tobacco Use    Smoking status: Never     Passive exposure: Never    Smokeless tobacco: Never   Vaping Use    Vaping status: Never Used   Substance and Sexual Activity    Alcohol use: Never    Drug use: Never    Sexual activity: Never           Objective   Physical Exam  Lungs are clear.  Heart has regular rate rhythm without murmur.  Chest nontender.  Abdomen soft with mild left flank tenderness.  Patient has normal bowel sounds without rebound or guarding.  Back has no CVA tenderness  Procedures       Results for orders placed or performed during the hospital encounter of 01/07/25   Basic Metabolic Panel    Collection Time: 01/07/25  8:07 PM    Specimen: Blood   Result Value Ref Range    Glucose 108 (H) 65 - 99 mg/dL    BUN 19 6 - 20 mg/dL    Creatinine 0.96 0.57 - 1.00 mg/dL    Sodium 137 136 - 145 mmol/L    Potassium 4.2 3.5 - 5.2 mmol/L    Chloride 103 98 - 107 mmol/L    CO2 23.4 22.0 - 29.0 mmol/L    Calcium 9.7 8.6 - 10.5 mg/dL    BUN/Creatinine Ratio 19.8 7.0 - 25.0    Anion Gap 10.6 5.0 - 15.0 mmol/L    eGFR 77.8 >60.0 mL/min/1.73   Lipase    Collection Time: 01/07/25  8:07 PM    Specimen: Blood   Result Value Ref  Range    Lipase 30 13 - 60 U/L   CBC Auto Differential    Collection Time: 01/07/25  8:07 PM    Specimen: Blood   Result Value Ref Range    WBC 12.72 (H) 3.40 - 10.80 10*3/mm3    RBC 4.00 3.77 - 5.28 10*6/mm3    Hemoglobin 10.3 (L) 12.0 - 15.9 g/dL    Hematocrit 32.6 (L) 34.0 - 46.6 %    MCV 81.5 79.0 - 97.0 fL    MCH 25.8 (L) 26.6 - 33.0 pg    MCHC 31.6 31.5 - 35.7 g/dL    RDW 14.6 12.3 - 15.4 %    RDW-SD 43.7 37.0 - 54.0 fl    MPV 10.1 6.0 - 12.0 fL    Platelets 344 140 - 450 10*3/mm3    Neutrophil % 82.6 (H) 42.7 - 76.0 %    Lymphocyte % 11.9 (L) 19.6 - 45.3 %    Monocyte % 3.9 (L) 5.0 - 12.0 %    Eosinophil % 1.1 0.3 - 6.2 %    Basophil % 0.2 0.0 - 1.5 %    Immature Grans % 0.3 0.0 - 0.5 %    Neutrophils, Absolute 10.50 (H) 1.70 - 7.00 10*3/mm3    Lymphocytes, Absolute 1.51 0.70 - 3.10 10*3/mm3    Monocytes, Absolute 0.50 0.10 - 0.90 10*3/mm3    Eosinophils, Absolute 0.14 0.00 - 0.40 10*3/mm3    Basophils, Absolute 0.03 0.00 - 0.20 10*3/mm3    Immature Grans, Absolute 0.04 0.00 - 0.05 10*3/mm3    nRBC 0.0 0.0 - 0.2 /100 WBC   Urinalysis With Microscopic If Indicated (No Culture) - Urine, Clean Catch    Collection Time: 01/07/25  8:08 PM    Specimen: Urine, Clean Catch   Result Value Ref Range    Color, UA Yellow Yellow, Straw    Appearance, UA Cloudy (A) Clear    pH, UA <=5.0 5.0 - 8.0    Specific Gravity, UA 1.027 1.005 - 1.030    Glucose, UA Negative Negative    Ketones, UA Trace (A) Negative    Bilirubin, UA Negative Negative    Blood, UA Large (3+) (A) Negative    Protein, UA Trace (A) Negative    Leuk Esterase, UA Trace (A) Negative    Nitrite, UA Negative Negative    Urobilinogen, UA 0.2 E.U./dL 0.2 - 1.0 E.U./dL   Urinalysis, Microscopic Only - Urine, Clean Catch    Collection Time: 01/07/25  8:08 PM    Specimen: Urine, Clean Catch   Result Value Ref Range    RBC, UA 11-20 (A) None Seen, 0-2 /HPF    WBC, UA 3-5 (A) None Seen, 0-2 /HPF    Bacteria, UA Trace (A) None Seen /HPF    Squamous Epithelial Cells,  UA 0-2 None Seen, 0-2 /HPF    Hyaline Casts, UA 0-2 None Seen /LPF    Granular Casts, UA 3-6 None Seen /LPF    Calcium Oxalate Crystals, UA Small/1+ None Seen /HPF    Amorphous Crystals, UA Small/1+ None Seen /HPF    Methodology Manual Light Microscopy      CT Abdomen Pelvis Without Contrast    Result Date: 1/7/2025  Impression: 1.There is a 1-2 mm stone in the distal left ureter causing slight left ureteral dilatation. There does not appear to be any significant left-sided hydronephrosis. I would recommend clinical correlation with respect to any symptoms of renal colic. 2.Suggestion of a left renal cyst. 3.Small hiatal hernia. 4.The exam is limited by noncontrasted technique. Electronically Signed: Wilver Rueda MD  1/7/2025 9:18 PM EST  Workstation ID: [id number]         ED Course                                                       Medical Decision Making  My interpretation patient CT scan and pelvis without contrast shows a 2 mm stone left distal ureter with obstructive change.  UA shows no evidence of urinary tract infection.  CBC shows leukocytosis with no left shift and no anemia.  BMP shows no renal sufficiency or electrolyte abnormality.  Patient.  Minimal pain on right evaluation.  He will be discharged with a prescription for Ultram.  Will follow the on-call urologist as needed    Amount and/or Complexity of Data Reviewed  Labs: ordered. Decision-making details documented in ED Course.  Radiology: ordered and independent interpretation performed.    Risk  Prescription drug management.        Final diagnoses:   Ureteral calculus       ED Disposition  ED Disposition       ED Disposition   Discharge    Condition   Stable    Comment   --               No follow-up provider specified.       Medication List        New Prescriptions      traMADol 50 MG tablet  Commonly known as: ULTRAM  Take 1 tablet by mouth Every 6 (Six) Hours As Needed for Severe Pain.               Where to Get Your Medications        These  medications were sent to [id number] - [place], [place] - [address] AT SEC OF [facility] [address] - [phone]  - [phone] FX  [address], [place] [address]      Phone: [phone]   traMADol 50 MG tablet            Adam May MD  01/07/25 9445

## 2025-01-13 ENCOUNTER — TELEPHONE (OUTPATIENT)
Dept: FAMILY MEDICINE CLINIC | Facility: CLINIC | Age: 40
End: 2025-01-13

## 2025-01-13 NOTE — TELEPHONE ENCOUNTER
[facility] staff attempted to follow warm transfer process and was unsuccessful       Caller: Sena Min    Relationship to patient: Self    Best call back number: [phone]     Chief complaint: KIDNEY STONE, LINGERING PAIN    Type of visit: ER/HOSPITAL FOLLOW UP    Requested date: ASAP     Additional notes: PATIENT STATES THAT SHE WENT TO THE ER AT [facility] ON 1/7/25, AND WAS DIAGNOSED WITH A KIDNEY STONE. SHE PASSED ONE, BUT STILL LINGERING PAIN, AND BELIEVES SHE MAY HAVE ANOTHER STONE. NO APPOINTMENTS FOR HOSPITAL FOLLOW UP WITHIN [facility]'S 7 DAY TIMEFRAME. PLEASE CALL TO FOLLOW UP AND SCHEDULE

## 2025-01-17 ENCOUNTER — OFFICE VISIT (OUTPATIENT)
Dept: FAMILY MEDICINE CLINIC | Facility: CLINIC | Age: 40
End: 2025-01-17
Payer: COMMERCIAL

## 2025-01-17 VITALS
OXYGEN SATURATION: 100 % | HEIGHT: 66 IN | WEIGHT: 249.3 LBS | DIASTOLIC BLOOD PRESSURE: 80 MMHG | RESPIRATION RATE: 12 BRPM | HEART RATE: 61 BPM | TEMPERATURE: 97.5 F | SYSTOLIC BLOOD PRESSURE: 120 MMHG | BODY MASS INDEX: 40.07 KG/M2

## 2025-01-17 DIAGNOSIS — K21.9 GASTROESOPHAGEAL REFLUX DISEASE, UNSPECIFIED WHETHER ESOPHAGITIS PRESENT: Primary | ICD-10-CM

## 2025-01-17 DIAGNOSIS — N20.0 KIDNEY STONES, CALCIUM OXALATE: ICD-10-CM

## 2025-01-17 DIAGNOSIS — R82.998 CALCIUM OXALATE CRYSTALS IN URINE: ICD-10-CM

## 2025-01-17 PROCEDURE — 99214 OFFICE O/P EST MOD 30 MIN: CPT | Performed by: NURSE PRACTITIONER

## 2025-01-17 NOTE — PROGRESS NOTES
Chief Complaint  Flank Pain (Pt had kidney stone still having pain)    Subjective        Sena Min presents to [facility]  History of Present Illness    History of Present Illness  The patient presents for an ER follow-up.    She was admitted to the emergency room due to severe pain from kidney stones, a condition she has previously experienced. 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 Despite maintaining hydration, she experienced two episodes of kidney stone passage within a week, one on Tuesday and another on Thursday night or Friday. The prescribed tramadol was ineffective in managing her pain, leading her to take a leftover oxycodone from her mother's knee replacement medication. She also reported feeling unwell for several days, suspecting a possible infection. She has been under the care of a urologist and has undergone a scope procedure. She has been making efforts to stay hydrated and has reduced her intake of dark drinks, caffeine. She has been consuming sugar-free caffeine mints and B vitamins to stay awake during her night shifts. She has also been using a heating pad for comfort. She has been drinking more water than she did in the past. She has been having one meal a day healthier. Her lunch at work is a smoothie which is milk, oats, banana, peanut butter, cocoa powder, no more sugar, cinnamon. She is not adding protein powder.    She has been on heartburn medication for several years. Over the past week, she has noticed that she gets full a lot easier. She has never had an endoscopy. When she was in her 20s, she saw Dr. Bro and told him she had tried Prilosec over the counter, but it was giving her diarrhea, so he prescribed pantoprazole and that has helped ever since. She is not due for any other gastroenterology procedures like colonoscopy. She has no issues with Crohn's disease.    FAMILY  "HISTORY  Her father had surgery for a hiatal hernia. Her brother has Crohn's disease.    MEDICATIONS  Current: pantoprazole, tramadol, oxycodone       Objective   Vital Signs:  /80   Pulse 61   Temp 97.5 °F (36.4 °C) (Infrared)   Resp 12   Ht 167.6 cm (66\")   Wt 113 kg (249 lb 4.8 oz)   SpO2 100%   BMI 40.24 kg/m²   Estimated body mass index is 40.24 kg/m² as calculated from the following:    Height as of this encounter: 167.6 cm (66\").    Weight as of this encounter: 113 kg (249 lb 4.8 oz).       Class 3 Severe Obesity (BMI >=40). Obesity-related health conditions include the following: GERD. Obesity is worsening. BMI is is above average; BMI management plan is completed. We discussed portion control, increasing exercise, and Information on healthy weight added to patient's after visit summary.      Physical Exam  Vitals and nursing note reviewed.   Constitutional:       General: She is not in acute distress.     Appearance: She is well-developed. She is not ill-appearing or diaphoretic.   HENT:      Head: Normocephalic and atraumatic.   Eyes:      General:         Right eye: No discharge.         Left eye: No discharge.      Conjunctiva/sclera: Conjunctivae normal.   Cardiovascular:      Rate and Rhythm: Normal rate and regular rhythm.      Heart sounds: Normal heart sounds.   Pulmonary:      Effort: Pulmonary effort is normal.      Breath sounds: Normal breath sounds.   Abdominal:      General: Bowel sounds are normal.      Palpations: Abdomen is soft.      Tenderness: There is no right CVA tenderness or left CVA tenderness.   Musculoskeletal:         General: No deformity.      Comments: Gait smooth and steady   Skin:     General: Skin is warm and dry.   Neurological:      Mental Status: She is alert and oriented to person, place, and time.   Psychiatric:         Mood and Affect: Mood normal.         Behavior: Behavior normal.          Physical Exam       Result Review " :            Results  Laboratory Studies  Urine showed high calcium oxalate crystals.    Imaging  CT scan showed 1 to 2 mm stone in the distal left ureter causing a little bit of dilation.                Assessment and Plan     Diagnoses and all orders for this visit:    1. Gastroesophageal reflux disease, unspecified whether esophagitis present (Primary)  -     Ambulatory referral for Screening EGD    2. Calcium oxalate crystals in urine  -     Urinalysis With Microscopic - Urine, Clean Catch; Future    3. Kidney stones, calcium oxalate        Assessment & Plan  1. Kidney stones.  The patient's urine analysis revealed a high concentration of calcium oxalate crystals, indicating the presence of calcium oxalate stones. A CT scan performed on 01/07/2024 showed a 1 to 2 mm stone in the distal left ureter causing slight dilation. She reported passing one stone and experiencing significant pain with subsequent stones.  She was advised to maintain adequate hydration and avoid dark beverages. The use of Litholyte, a powder added to water, was suggested to help prevent future stones. She was also encouraged to explore potential remedies available at [facility]. A urinalysis will be conducted in 1 month to monitor for the presence of calcium oxalate crystals.    2. GERD  The patient mentioned a small hiatal hernia noted in a recent imaging study. She has been on pantoprazole for years to manage acid reflux. Recently, she has experienced early satiety, which could be a symptom of her hiatal hernia. An endoscopy will be scheduled to further evaluate her condition.            Follow Up     No follow-ups on file.  Patient was given instructions and counseling regarding her condition or for health maintenance advice. Please see specific information pulled into the AVS if appropriate.    Patient or patient representative verbalized consent for the use of Ambient Listening during the visit with  CLINTON Vega for  chart documentation. 1/30/2025  18:41 EST

## 2025-01-28 ENCOUNTER — PREP FOR SURGERY (OUTPATIENT)
Dept: SURGERY | Facility: SURGERY CENTER | Age: 40
End: 2025-01-28
Payer: COMMERCIAL

## 2025-01-28 ENCOUNTER — OFFICE VISIT (OUTPATIENT)
Dept: GASTROENTEROLOGY | Facility: CLINIC | Age: 40
End: 2025-01-28
Payer: COMMERCIAL

## 2025-01-28 VITALS
BODY MASS INDEX: 38.89 KG/M2 | WEIGHT: 242 LBS | DIASTOLIC BLOOD PRESSURE: 80 MMHG | HEART RATE: 76 BPM | OXYGEN SATURATION: 100 % | TEMPERATURE: 96.1 F | SYSTOLIC BLOOD PRESSURE: 120 MMHG | HEIGHT: 66 IN

## 2025-01-28 DIAGNOSIS — K21.9 GASTROESOPHAGEAL REFLUX DISEASE, UNSPECIFIED WHETHER ESOPHAGITIS PRESENT: Primary | ICD-10-CM

## 2025-01-28 DIAGNOSIS — R68.81 EARLY SATIETY: ICD-10-CM

## 2025-01-28 PROCEDURE — 99214 OFFICE O/P EST MOD 30 MIN: CPT | Performed by: NURSE PRACTITIONER

## 2025-01-28 RX ORDER — SODIUM CHLORIDE 0.9 % (FLUSH) 0.9 %
3 SYRINGE (ML) INJECTION EVERY 12 HOURS SCHEDULED
OUTPATIENT
Start: 2025-01-28

## 2025-01-28 RX ORDER — SODIUM CHLORIDE, SODIUM LACTATE, POTASSIUM CHLORIDE, CALCIUM CHLORIDE 600; 310; 30; 20 MG/100ML; MG/100ML; MG/100ML; MG/100ML
30 INJECTION, SOLUTION INTRAVENOUS CONTINUOUS PRN
OUTPATIENT
Start: 2025-01-28 | End: 2025-01-28

## 2025-01-28 RX ORDER — SODIUM CHLORIDE 0.9 % (FLUSH) 0.9 %
10 SYRINGE (ML) INJECTION AS NEEDED
OUTPATIENT
Start: 2025-01-28

## 2025-01-28 NOTE — PATIENT INSTRUCTIONS
Schedule EGD for further evaluation of symptoms.     For GERD, follow antireflux precautions.  Recommend avoiding eating within 3 to 4 hours of bedtime.  Avoid foods that can trigger symptoms which may include citrus fruits, spicy foods, tomatoes and red sauces, chocolate, coffee/tea, caffeinated or carbonated beverages, alcohol.

## 2025-01-28 NOTE — PROGRESS NOTES
"Chief Complaint   Patient presents with    Heartburn         History of Present Illness  Patient is a 39-year-old female who presents today for evaluation, referred for GERD.  She had a recent CT scan performed that showed a small hiatal hernia.    Patient presents today for evaluation.  She reports a longstanding history of reflux.  Symptoms have been present for around 15 years.  She has been on pantoprazole.  Symptoms are well-controlled on pantoprazole.  She has never had an EGD.    Over the last few weeks she has been experiencing early satiety and nausea.  This is a new problem.  She is also noted some left upper quadrant discomfort, particularly when she is lying down in bed at night.    She has used NSAIDs regularly in the past but recently stopped.    She has a brother who has Crohn's disease and her father has a hiatal hernia and Crawford's esophagus.    She will be going on a hiking trip in [place] in March.     Result Review :       CT Abdomen Pelvis Without Contrast (01/07/2025 21:01)    Office Visit with Jennifer Dobson APRN (01/17/2025)    Referral to Coy Rand MD for Gastroesophageal reflux disease, unspecified whether esophagitis present (01/17/2025)    Vital Signs:   /80   Pulse 76   Temp 96.1 °F (35.6 °C)   Ht 167.6 cm (65.98\")   Wt 110 kg (242 lb)   SpO2 100%   BMI 39.08 kg/m²     Body mass index is 39.08 kg/m².     Physical Exam  Vitals reviewed.   Constitutional:       General: She is not in acute distress.     Appearance: She is well-developed.   HENT:      Head: Normocephalic and atraumatic.   Pulmonary:      Effort: Pulmonary effort is normal. No respiratory distress.   Abdominal:      General: Abdomen is flat. Bowel sounds are normal. There is no distension.      Palpations: Abdomen is soft.      Tenderness: There is no abdominal tenderness.   Skin:     General: Skin is dry.      Coloration: Skin is not pale.   Neurological:      Mental Status: She is alert and " oriented to person, place, and time.   Psychiatric:         Thought Content: Thought content normal.           Assessment and Plan    Diagnoses and all orders for this visit:    1. Gastroesophageal reflux disease, unspecified whether esophagitis present (Primary)    2. Early satiety         Discussion  Patient presents today for evaluation with concerns about chronic GERD and new issues with early satiety.  Recommended EGD for further evaluation, assess for any evidence of esophagitis, Crawford's esophagus, gastritis, peptic ulcer disease, or gastric outlet obstruction that could be contributing to symptoms.  If EGD is negative and symptoms persist, may consider gastric emptying study to evaluate for gastroparesis.  Recommended continuing pantoprazole for GERD and reviewed dietary modifications to help with reflux.          Follow Up   Return for Follow up to review results after testing complete.    Patient Instructions   Schedule EGD for further evaluation of symptoms.     For GERD, follow antireflux precautions.  Recommend avoiding eating within 3 to 4 hours of bedtime.  Avoid foods that can trigger symptoms which may include citrus fruits, spicy foods, tomatoes and red sauces, chocolate, coffee/tea, caffeinated or carbonated beverages, alcohol.

## 2025-02-05 ENCOUNTER — ANESTHESIA (OUTPATIENT)
Dept: SURGERY | Facility: SURGERY CENTER | Age: 40
End: 2025-02-05
Payer: COMMERCIAL

## 2025-02-05 ENCOUNTER — HOSPITAL ENCOUNTER (OUTPATIENT)
Facility: SURGERY CENTER | Age: 40
Setting detail: HOSPITAL OUTPATIENT SURGERY
Discharge: HOME | End: 2025-02-05
Attending: INTERNAL MEDICINE | Admitting: INTERNAL MEDICINE
Payer: COMMERCIAL

## 2025-02-05 ENCOUNTER — ANESTHESIA EVENT (OUTPATIENT)
Dept: SURGERY | Facility: SURGERY CENTER | Age: 40
End: 2025-02-05
Payer: COMMERCIAL

## 2025-02-05 VITALS
TEMPERATURE: 97.8 F | DIASTOLIC BLOOD PRESSURE: 79 MMHG | RESPIRATION RATE: 16 BRPM | WEIGHT: 238.4 LBS | HEIGHT: 66 IN | SYSTOLIC BLOOD PRESSURE: 115 MMHG | HEART RATE: 79 BPM | OXYGEN SATURATION: 100 % | BODY MASS INDEX: 38.31 KG/M2

## 2025-02-05 DIAGNOSIS — K21.9 GASTROESOPHAGEAL REFLUX DISEASE, UNSPECIFIED WHETHER ESOPHAGITIS PRESENT: ICD-10-CM

## 2025-02-05 DIAGNOSIS — R68.81 EARLY SATIETY: ICD-10-CM

## 2025-02-05 DIAGNOSIS — R10.13 EPIGASTRIC PAIN: ICD-10-CM

## 2025-02-05 DIAGNOSIS — R10.11 RIGHT UPPER QUADRANT ABDOMINAL PAIN: Primary | ICD-10-CM

## 2025-02-05 LAB
B-HCG UR QL: NEGATIVE
EXPIRATION DATE: NORMAL
INTERNAL NEGATIVE CONTROL: NEGATIVE
INTERNAL POSITIVE CONTROL: POSITIVE
Lab: NORMAL

## 2025-02-05 PROCEDURE — 88305 TISSUE EXAM BY PATHOLOGIST: CPT | Performed by: INTERNAL MEDICINE

## 2025-02-05 PROCEDURE — 81025 URINE PREGNANCY TEST: CPT | Performed by: NURSE PRACTITIONER

## 2025-02-05 PROCEDURE — 43239 EGD BIOPSY SINGLE/MULTIPLE: CPT | Performed by: INTERNAL MEDICINE

## 2025-02-05 PROCEDURE — 25810000003 LACTATED RINGERS PER 1000 ML: Performed by: NURSE PRACTITIONER

## 2025-02-05 PROCEDURE — 25010000002 PROPOFOL 10 MG/ML EMULSION: Performed by: NURSE ANESTHETIST, CERTIFIED REGISTERED

## 2025-02-05 PROCEDURE — 25010000002 LIDOCAINE 2% SOLUTION: Performed by: NURSE ANESTHETIST, CERTIFIED REGISTERED

## 2025-02-05 PROCEDURE — 25010000002 PROPOFOL 1000 MG/100ML EMULSION: Performed by: NURSE ANESTHETIST, CERTIFIED REGISTERED

## 2025-02-05 PROCEDURE — 25010000002 GLYCOPYRROLATE PF 0.2 MG/ML SOLUTION: Performed by: NURSE ANESTHETIST, CERTIFIED REGISTERED

## 2025-02-05 PROCEDURE — 63710000001 ONDANSETRON ODT 4 MG TABLET DISPERSIBLE: Performed by: INTERNAL MEDICINE

## 2025-02-05 RX ORDER — LIDOCAINE HYDROCHLORIDE 20 MG/ML
INJECTION, SOLUTION INFILTRATION; PERINEURAL AS NEEDED
Status: DISCONTINUED | OUTPATIENT
Start: 2025-02-05 | End: 2025-02-05 | Stop reason: SURG

## 2025-02-05 RX ORDER — SODIUM CHLORIDE 0.9 % (FLUSH) 0.9 %
3 SYRINGE (ML) INJECTION EVERY 12 HOURS SCHEDULED
Status: DISCONTINUED | OUTPATIENT
Start: 2025-02-05 | End: 2025-02-05 | Stop reason: HOSPADM

## 2025-02-05 RX ORDER — PROPOFOL 10 MG/ML
INJECTION, EMULSION INTRAVENOUS AS NEEDED
Status: DISCONTINUED | OUTPATIENT
Start: 2025-02-05 | End: 2025-02-05 | Stop reason: SURG

## 2025-02-05 RX ORDER — ONDANSETRON 4 MG/1
4 TABLET, ORALLY DISINTEGRATING ORAL EVERY 6 HOURS PRN
Status: DISCONTINUED | OUTPATIENT
Start: 2025-02-05 | End: 2025-02-05 | Stop reason: HOSPADM

## 2025-02-05 RX ORDER — SODIUM CHLORIDE, SODIUM LACTATE, POTASSIUM CHLORIDE, CALCIUM CHLORIDE 600; 310; 30; 20 MG/100ML; MG/100ML; MG/100ML; MG/100ML
30 INJECTION, SOLUTION INTRAVENOUS CONTINUOUS PRN
Status: ACTIVE | OUTPATIENT
Start: 2025-02-05 | End: 2025-02-05

## 2025-02-05 RX ORDER — SODIUM CHLORIDE 0.9 % (FLUSH) 0.9 %
10 SYRINGE (ML) INJECTION AS NEEDED
Status: DISCONTINUED | OUTPATIENT
Start: 2025-02-05 | End: 2025-02-05 | Stop reason: HOSPADM

## 2025-02-05 RX ADMIN — PROPOFOL 150 MG: 10 INJECTION, EMULSION INTRAVENOUS at 07:27

## 2025-02-05 RX ADMIN — SODIUM CHLORIDE, POTASSIUM CHLORIDE, SODIUM LACTATE AND CALCIUM CHLORIDE 30 ML/HR: 600; 310; 30; 20 INJECTION, SOLUTION INTRAVENOUS at 06:35

## 2025-02-05 RX ADMIN — ONDANSETRON 4 MG: 4 TABLET, ORALLY DISINTEGRATING ORAL at 08:08

## 2025-02-05 RX ADMIN — LIDOCAINE HYDROCHLORIDE 100 MG: 20 INJECTION, SOLUTION INFILTRATION; PERINEURAL at 07:27

## 2025-02-05 RX ADMIN — GLYCOPYRROLATE 0.2 MG: 0.2 INJECTION, SOLUTION INTRAMUSCULAR; INTRAVITREAL at 07:25

## 2025-02-05 RX ADMIN — PROPOFOL 300 MCG/KG/MIN: 10 INJECTION, EMULSION INTRAVENOUS at 07:27

## 2025-02-05 NOTE — ANESTHESIA PREPROCEDURE EVALUATION
Anesthesia Evaluation     Patient summary reviewed and Nursing notes reviewed   history of anesthetic complications:  PONV  NPO Solid Status: > 8 hours  NPO Liquid Status: > 2 hours           Airway   Mallampati: II  TM distance: >3 FB  Neck ROM: full  No difficulty expected  Dental - normal exam     Pulmonary - negative pulmonary ROS and normal exam   Cardiovascular - negative cardio ROS and normal exam  Exercise tolerance: good (4-7 METS)        Neuro/Psych  (+) weakness, psychiatric history Anxiety and Depression  GI/Hepatic/Renal/Endo    (+) morbid obesity, GERD    Musculoskeletal     Abdominal    Substance History - negative use     OB/GYN negative ob/gyn ROS         Other   arthritis,                 Anesthesia Plan    ASA 2     MAC     intravenous induction     Anesthetic plan, risks, benefits, and alternatives have been provided, discussed and informed consent has been obtained with: patient.    CODE STATUS:

## 2025-02-05 NOTE — H&P
No chief complaint on file.      HPI  Epigastric pain  Early satiety         Problem List:    Patient Active Problem List   Diagnosis    Gastroesophageal reflux disease    Chondromalacia, knee    Complex tear of lateral meniscus of right knee as current injury    Tear of lateral meniscus of right knee, current    Posterior subcapsular polar senile cataract of both eyes    Vitreous floaters    Myopia    Heterozygous factor V Leiden mutation    Hypercoagulable state    History of iron deficiency anemia    H/O right knee surgery    Diarrhea in adult patient    Acute cystitis with hematuria    Acute cystitis without hematuria    Early satiety       Medical History:    Past Medical History:   Diagnosis Date    Ankle sprain     Anxiety     Arthritis     Depression     Factor 5 Leiden mutation, heterozygous     GERD (gastroesophageal reflux disease)     History of medical problems     Factor V Leiden    History of torn meniscus of knee     S/P surgery    Myopia 02/19/2015    Obesity     PONV (postoperative nausea and vomiting)     Posterior subcapsular polar senile cataract of both eyes 06/06/2022        Social History:    Social History     Socioeconomic History    Marital status: Single   Tobacco Use    Smoking status: Never     Passive exposure: Never    Smokeless tobacco: Never   Vaping Use    Vaping status: Never Used   Substance and Sexual Activity    Alcohol use: Never    Drug use: Never    Sexual activity: Never       Family History:   Family History   Problem Relation Age of Onset    Breast cancer Mother     Hypertension Mother     Arthritis Mother         Hip replacement at 61    Cancer Mother         Breast cancer age 38    Depression Mother     Heart disease Mother         AFIB    Broken bones Mother     Hypertension Father     Factor V Leiden deficiency Father     Clotting disorder Father         Factor five    PRATEEK disease Father     Crawford's esophagus Father     Hiatal hernia Father     Factor V Leiden  deficiency Sister     Clotting disorder Sister         Factor five    Crohn's disease Brother     Factor V Leiden deficiency Paternal Aunt     Breast cancer Maternal Grandmother     Cancer Maternal Grandmother         Breast cancer age 50    Heart disease Maternal Grandmother         Heart valve repair    Mental illness Maternal Grandmother         Dementia    Diabetes Maternal Grandfather     Arthritis Maternal Grandfather     Heart disease Maternal Grandfather         Heart failure, Heart attack and valve replacement    Cancer Maternal Grandfather         Lung cancer    Stroke Maternal Grandfather         Cause of death    Alzheimer's disease Paternal Grandmother     Mental illness Paternal Grandmother         Alzheimers    Factor V Leiden deficiency Paternal Grandfather     Hearing loss Paternal Grandfather         meniers    Heart disease Paternal Grandfather         Heart valve replacement    Clotting disorder Paternal Grandfather         Factor five    Colon cancer Neg Hx     Colon polyps Neg Hx     Irritable bowel syndrome Neg Hx     Ulcerative colitis Neg Hx        Surgical History:   Past Surgical History:   Procedure Laterality Date    ANKLE OPEN REDUCTION INTERNAL FIXATION  Jan. 2001, Jan 2016    Set broken tib-fib, remove plate from fib    EYE SURGERY  9/2024    Cataract surgery    FRACTURE SURGERY  01/08/2001    Plate removed 01/15/2016    JOINT REPLACEMENT  9/15/2013    Right knee patella femoral pkr and osteotomy. Left knee patella femoral pkr    KNEE ARTHROPLASTY, PARTIAL REPLACEMENT Left 12/15/2023    LEG SURGERY  01/2016    plate removed    WISDOM TOOTH EXTRACTION  2009         Current Facility-Administered Medications:     lactated ringers infusion, 30 mL/hr, Intravenous, Continuous PRN, Dago, Clarissa G, APRN, Last Rate: 30 mL/hr at 02/05/25 0635, 30 mL/hr at 02/05/25 0635    sodium chloride 0.9 % flush 10 mL, 10 mL, Intravenous, PRN, Dago, Clarissa G, APRN    sodium chloride 0.9 % flush 3 mL, 3  mL, Intravenous, Q12H, Dago, Clarissa G, APRN    Allergies:   Allergies   Allergen Reactions    Nickel Rash        The following portions of the patient's history were reviewed by me and updated as appropriate: review of systems, allergies, current medications, past family history, past medical history, past social history, past surgical history and problem list.    Vitals:    02/05/25 0629   BP: 111/76   Pulse: 70   Resp: 16   Temp: 97 °F (36.1 °C)   SpO2: 99%       PHYSICAL EXAM:    CONSTITUTIONAL:  today's vital signs reviewed by me  GASTROINTESTINAL: abdomen is soft nontender nondistended with normal active bowel sounds, no masses are appreciated    Assessment/ Plan  Epigastric pain  Early satiety    egd    Risks and benefits as well as alternatives to endoscopic evaluation were explained to the patient and they voiced understanding and wish to proceed.  These risks include but are not limited to the risk of bleeding, perforation, adverse reaction to sedation, and missed lesions.  The patient was given the opportunity to ask questions prior to the endoscopic procedure.

## 2025-02-05 NOTE — ANESTHESIA POSTPROCEDURE EVALUATION
"Patient: Sena Min    Procedure Summary       Date: 02/05/25 Room / Location: [facility] / [facility]    Anesthesia Start: 0723 Anesthesia Stop: 0739    Procedure: ESOPHAGOGASTRODUODENOSCOPY WITH BIOPSY Diagnosis:       Gastroesophageal reflux disease, unspecified whether esophagitis present      Early satiety      (Gastroesophageal reflux disease, unspecified whether esophagitis present [K21.9])      (Early satiety [R68.81])    Surgeons: Coy Rand MD Provider: Juan Rainey MD    Anesthesia Type: MAC ASA Status: 2            Anesthesia Type: MAC    Vitals  Vitals Value Taken Time   /79 02/05/25 0802   Temp 36.6 °C (97.8 °F) 02/05/25 0739   Pulse 69 02/05/25 0802   Resp 16 02/05/25 0739   SpO2 100 % 02/05/25 0756   Vitals shown include unfiled device data.        Post Anesthesia Care and Evaluation    Patient location during evaluation: PACU  Level of consciousness: awake  Pain management: adequate    Airway patency: patent  Anesthetic complications: No anesthetic complications  PONV Status: controlled  Cardiovascular status: acceptable  Respiratory status: acceptable  Hydration status: acceptable    Comments: /79   Pulse 79   Temp 36.6 °C (97.8 °F) (Tympanic)   Resp 16   Ht 167.6 cm (66\")   Wt 108 kg (238 lb 6.4 oz)   LMP 01/01/2025 (Exact Date)   SpO2 100%   BMI 38.48 kg/m²       "

## 2025-02-06 LAB
CYTO UR: NORMAL
LAB AP CASE REPORT: NORMAL
LAB AP CLINICAL INFORMATION: NORMAL
PATH REPORT.FINAL DX SPEC: NORMAL
PATH REPORT.GROSS SPEC: NORMAL

## 2025-02-14 ENCOUNTER — HOSPITAL ENCOUNTER (OUTPATIENT)
Dept: ULTRASOUND IMAGING | Facility: HOSPITAL | Age: 40
Discharge: HOME | End: 2025-02-14
Payer: COMMERCIAL

## 2025-02-14 ENCOUNTER — LAB (OUTPATIENT)
Dept: LAB | Facility: HOSPITAL | Age: 40
End: 2025-02-14
Payer: COMMERCIAL

## 2025-02-14 DIAGNOSIS — R10.11 RIGHT UPPER QUADRANT ABDOMINAL PAIN: ICD-10-CM

## 2025-02-14 DIAGNOSIS — R68.81 EARLY SATIETY: ICD-10-CM

## 2025-02-14 DIAGNOSIS — R10.13 EPIGASTRIC PAIN: ICD-10-CM

## 2025-02-14 PROCEDURE — 76705 ECHO EXAM OF ABDOMEN: CPT

## 2025-02-14 PROCEDURE — 81001 URINALYSIS AUTO W/SCOPE: CPT | Performed by: NURSE PRACTITIONER

## 2025-02-17 ENCOUNTER — PATIENT MESSAGE (OUTPATIENT)
Dept: GASTROENTEROLOGY | Facility: CLINIC | Age: 40
End: 2025-02-17
Payer: COMMERCIAL

## 2025-03-06 DIAGNOSIS — K21.9 GASTROESOPHAGEAL REFLUX DISEASE, UNSPECIFIED WHETHER ESOPHAGITIS PRESENT: ICD-10-CM

## 2025-03-06 NOTE — TELEPHONE ENCOUNTER
Rx Refill Note  Requested Prescriptions     Pending Prescriptions Disp Refills    pantoprazole (PROTONIX) 40 MG EC tablet [Pharmacy Med Name: PANTOPRAZOLE 40MG TABLETS] 90 tablet 1     Sig: TAKE 1 TABLET BY MOUTH DAILY      Last office visit with prescribing clinician: 1/17/2025   Last telemedicine visit with prescribing clinician: Visit date not found   Next office visit with prescribing clinician: 7/14/2025                         Would you like a call back once the refill request has been completed: [] Yes [] No    If the office needs to give you a call back, can they leave a voicemail: [] Yes [] No    Charito Avilez Rep  03/06/25, 17:11 EST

## 2025-03-07 RX ORDER — PANTOPRAZOLE SODIUM 40 MG/1
40 TABLET, DELAYED RELEASE ORAL DAILY
Qty: 90 TABLET | Refills: 1 | Status: SHIPPED | OUTPATIENT
Start: 2025-03-07

## 2025-06-20 ENCOUNTER — TRANSCRIBE ORDERS (OUTPATIENT)
Dept: ADMINISTRATIVE | Facility: HOSPITAL | Age: 40
End: 2025-06-20
Payer: COMMERCIAL

## 2025-06-20 DIAGNOSIS — Z12.31 SCREENING MAMMOGRAM FOR BREAST CANCER: Primary | ICD-10-CM

## 2025-07-14 ENCOUNTER — OFFICE VISIT (OUTPATIENT)
Dept: FAMILY MEDICINE CLINIC | Facility: CLINIC | Age: 40
End: 2025-07-14
Payer: COMMERCIAL

## 2025-07-14 VITALS
DIASTOLIC BLOOD PRESSURE: 70 MMHG | BODY MASS INDEX: 38.77 KG/M2 | HEART RATE: 61 BPM | SYSTOLIC BLOOD PRESSURE: 128 MMHG | WEIGHT: 240.2 LBS | OXYGEN SATURATION: 96 % | TEMPERATURE: 93.5 F

## 2025-07-14 DIAGNOSIS — Z11.51 SCREENING FOR HUMAN PAPILLOMAVIRUS (HPV): ICD-10-CM

## 2025-07-14 DIAGNOSIS — Z00.00 ROUTINE GENERAL MEDICAL EXAMINATION AT A HEALTH CARE FACILITY: ICD-10-CM

## 2025-07-14 DIAGNOSIS — Z12.4 ENCOUNTER FOR PAPANICOLAOU SMEAR FOR CERVICAL CANCER SCREENING: ICD-10-CM

## 2025-07-14 DIAGNOSIS — Z12.39 ENCOUNTER FOR BREAST CANCER SCREENING USING NON-MAMMOGRAM MODALITY: ICD-10-CM

## 2025-07-14 DIAGNOSIS — Z01.419 WELL WOMAN EXAM WITH ROUTINE GYNECOLOGICAL EXAM: Primary | ICD-10-CM

## 2025-07-14 PROCEDURE — 99395 PREV VISIT EST AGE 18-39: CPT | Performed by: NURSE PRACTITIONER
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
## 2025-07-16 LAB
AGE GDLN ACOG TESTING: NORMAL
CYTOLOGIST CVX/VAG CYTO: NORMAL
CYTOLOGY CVX/VAG DOC CYTO: NORMAL
CYTOLOGY CVX/VAG DOC THIN PREP: NORMAL
DX ICD CODE: NORMAL
HPV GENOTYPE REFLEX: NORMAL
HPV I/H RISK 4 DNA CVX QL PROBE+SIG AMP: NEGATIVE
OTHER STN SPEC: NORMAL
SERVICE CMNT-IMP: NORMAL
STAT OF ADQ CVX/VAG CYTO-IMP: NORMAL

## 2025-07-29 ENCOUNTER — TELEPHONE (OUTPATIENT)
Dept: FAMILY MEDICINE CLINIC | Facility: CLINIC | Age: 40
End: 2025-07-29
Payer: COMMERCIAL

## (undated) DEVICE — MSK ENDO PORT O2 POM ELITE CURAPLEX A/

## (undated) DEVICE — BLCK/BITE BLOX W/DENTL/RIM W/STRAP 54F

## (undated) DEVICE — VIAL FORMLN CAP 10PCT 20ML

## (undated) DEVICE — Device

## (undated) DEVICE — ADAPT CLN SCPE ENDO PORPOISE BX/50 DISP

## (undated) DEVICE — SINGLE-USE BIOPSY FORCEPS: Brand: RADIAL JAW 4

## (undated) DEVICE — GOWN PROC ENDOARMOR GI LVL3 HY/SHLD UNIV

## (undated) DEVICE — FLEX ADVANTAGE 1500CC: Brand: FLEX ADVANTAGE

## (undated) DEVICE — KT ORCA ORCAPOD DISP STRL